# Patient Record
Sex: FEMALE | Race: WHITE | Employment: UNEMPLOYED | URBAN - METROPOLITAN AREA
[De-identification: names, ages, dates, MRNs, and addresses within clinical notes are randomized per-mention and may not be internally consistent; named-entity substitution may affect disease eponyms.]

---

## 2018-06-06 ENCOUNTER — TELEPHONE (OUTPATIENT)
Dept: INTERNAL MEDICINE CLINIC | Age: 23
End: 2018-06-06

## 2018-06-19 ENCOUNTER — OFFICE VISIT (OUTPATIENT)
Dept: INTERNAL MEDICINE CLINIC | Age: 23
End: 2018-06-19

## 2018-06-19 VITALS
SYSTOLIC BLOOD PRESSURE: 118 MMHG | TEMPERATURE: 99 F | HEIGHT: 64 IN | HEART RATE: 86 BPM | DIASTOLIC BLOOD PRESSURE: 82 MMHG | OXYGEN SATURATION: 97 % | BODY MASS INDEX: 16.46 KG/M2 | WEIGHT: 96.4 LBS

## 2018-06-19 DIAGNOSIS — R19.7 DIARRHEA, UNSPECIFIED TYPE: ICD-10-CM

## 2018-06-19 DIAGNOSIS — R63.4 ABNORMAL WEIGHT LOSS: Primary | ICD-10-CM

## 2018-06-19 DIAGNOSIS — R63.4 ABNORMAL WEIGHT LOSS: ICD-10-CM

## 2018-06-19 PROCEDURE — 99203 OFFICE O/P NEW LOW 30 MIN: CPT | Performed by: INTERNAL MEDICINE

## 2018-06-19 RX ORDER — LEVONORGESTREL AND ETHINYL ESTRADIOL 0.1-0.02MG
1 KIT ORAL DAILY
COMMUNITY
End: 2019-02-20 | Stop reason: ALTCHOICE

## 2018-06-19 ASSESSMENT — ENCOUNTER SYMPTOMS
COUGH: 0
WHEEZING: 0
SHORTNESS OF BREATH: 0

## 2018-06-19 ASSESSMENT — PATIENT HEALTH QUESTIONNAIRE - PHQ9
1. LITTLE INTEREST OR PLEASURE IN DOING THINGS: 0
SUM OF ALL RESPONSES TO PHQ QUESTIONS 1-9: 0
SUM OF ALL RESPONSES TO PHQ9 QUESTIONS 1 & 2: 0
2. FEELING DOWN, DEPRESSED OR HOPELESS: 0

## 2018-06-20 ENCOUNTER — TELEPHONE (OUTPATIENT)
Dept: INTERNAL MEDICINE CLINIC | Age: 23
End: 2018-06-20

## 2018-06-20 DIAGNOSIS — R19.7 DIARRHEA, UNSPECIFIED TYPE: ICD-10-CM

## 2018-06-20 DIAGNOSIS — R63.4 WEIGHT LOSS, ABNORMAL: Primary | ICD-10-CM

## 2018-06-20 DIAGNOSIS — R10.9 ABDOMINAL PAIN, UNSPECIFIED ABDOMINAL LOCATION: ICD-10-CM

## 2018-06-20 LAB
A/G RATIO: 2.1 (ref 1.1–2.2)
ALBUMIN SERPL-MCNC: 4.8 G/DL (ref 3.4–5)
ALP BLD-CCNC: 43 U/L (ref 40–129)
ALT SERPL-CCNC: 17 U/L (ref 10–40)
ANION GAP SERPL CALCULATED.3IONS-SCNC: 20 MMOL/L (ref 3–16)
AST SERPL-CCNC: 17 U/L (ref 15–37)
BACTERIA: ABNORMAL /HPF
BASOPHILS ABSOLUTE: 0 K/UL (ref 0–0.2)
BASOPHILS RELATIVE PERCENT: 0.5 %
BILIRUB SERPL-MCNC: 0.7 MG/DL (ref 0–1)
BILIRUBIN URINE: NEGATIVE
BLOOD, URINE: ABNORMAL
BUN BLDV-MCNC: 9 MG/DL (ref 7–20)
CALCIUM SERPL-MCNC: 9.7 MG/DL (ref 8.3–10.6)
CHLORIDE BLD-SCNC: 100 MMOL/L (ref 99–110)
CLARITY: CLEAR
CO2: 21 MMOL/L (ref 21–32)
COLOR: YELLOW
CORTISOL - PM: 46.7 UG/DL (ref 3.1–16.7)
CREAT SERPL-MCNC: 0.7 MG/DL (ref 0.6–1.1)
EOSINOPHILS ABSOLUTE: 0 K/UL (ref 0–0.6)
EOSINOPHILS RELATIVE PERCENT: 0.2 %
EPITHELIAL CELLS, UA: 5 /HPF (ref 0–5)
GFR AFRICAN AMERICAN: >60
GFR NON-AFRICAN AMERICAN: >60
GLOBULIN: 2.3 G/DL
GLUCOSE BLD-MCNC: 92 MG/DL (ref 70–99)
GLUCOSE URINE: NEGATIVE MG/DL
HCG QUALITATIVE: NEGATIVE
HCT VFR BLD CALC: 43.2 % (ref 36–48)
HEMOGLOBIN: 15.5 G/DL (ref 12–16)
HYALINE CASTS: 2 /LPF (ref 0–8)
KETONES, URINE: NEGATIVE MG/DL
LEUKOCYTE ESTERASE, URINE: NEGATIVE
LYMPHOCYTES ABSOLUTE: 1.9 K/UL (ref 1–5.1)
LYMPHOCYTES RELATIVE PERCENT: 39.4 %
MCH RBC QN AUTO: 34.2 PG (ref 26–34)
MCHC RBC AUTO-ENTMCNC: 35.9 G/DL (ref 31–36)
MCV RBC AUTO: 95.1 FL (ref 80–100)
MICROSCOPIC EXAMINATION: YES
MONOCYTES ABSOLUTE: 0.3 K/UL (ref 0–1.3)
MONOCYTES RELATIVE PERCENT: 6.3 %
NEUTROPHILS ABSOLUTE: 2.5 K/UL (ref 1.7–7.7)
NEUTROPHILS RELATIVE PERCENT: 53.6 %
NITRITE, URINE: NEGATIVE
PDW BLD-RTO: 12.6 % (ref 12.4–15.4)
PH UA: 7
PLATELET # BLD: 158 K/UL (ref 135–450)
PMV BLD AUTO: 8.9 FL (ref 5–10.5)
POTASSIUM SERPL-SCNC: 3.6 MMOL/L (ref 3.5–5.1)
PROTEIN UA: 30 MG/DL
RBC # BLD: 4.54 M/UL (ref 4–5.2)
RBC UA: 4 /HPF (ref 0–4)
SODIUM BLD-SCNC: 141 MMOL/L (ref 136–145)
SPECIFIC GRAVITY UA: 1.01
TOTAL PROTEIN: 7.1 G/DL (ref 6.4–8.2)
TSH SERPL DL<=0.05 MIU/L-ACNC: 0.9 UIU/ML (ref 0.27–4.2)
URINE TYPE: ABNORMAL
UROBILINOGEN, URINE: 0.2 E.U./DL
WBC # BLD: 4.7 K/UL (ref 4–11)
WBC UA: 2 /HPF (ref 0–5)

## 2018-06-22 ENCOUNTER — TELEPHONE (OUTPATIENT)
Dept: INTERNAL MEDICINE CLINIC | Age: 23
End: 2018-06-22

## 2018-06-26 ENCOUNTER — HOSPITAL ENCOUNTER (OUTPATIENT)
Dept: ULTRASOUND IMAGING | Age: 23
Discharge: HOME | End: 2018-06-26
Attending: INTERNAL MEDICINE | Admitting: INTERNAL MEDICINE

## 2018-06-26 ENCOUNTER — TELEPHONE (OUTPATIENT)
Dept: INTERNAL MEDICINE CLINIC | Age: 23
End: 2018-06-26

## 2018-06-26 DIAGNOSIS — R19.7 DIARRHEA, UNSPECIFIED TYPE: ICD-10-CM

## 2018-06-26 DIAGNOSIS — R10.9 ABDOMINAL PAIN, UNSPECIFIED ABDOMINAL LOCATION: ICD-10-CM

## 2018-06-26 DIAGNOSIS — R63.4 WEIGHT LOSS, ABNORMAL: ICD-10-CM

## 2018-06-26 DIAGNOSIS — R63.4 ABNORMAL WEIGHT LOSS: ICD-10-CM

## 2018-06-26 DIAGNOSIS — R19.7 DIARRHEA, UNSPECIFIED TYPE: Primary | ICD-10-CM

## 2018-07-05 ENCOUNTER — TELEPHONE (OUTPATIENT)
Dept: INTERNAL MEDICINE CLINIC | Age: 23
End: 2018-07-05

## 2018-07-05 NOTE — TELEPHONE ENCOUNTER
Left detailed message on VM that Dr Angely Baugh has openings tomorrow if she would like to schedule with him to call back. Advised if pain is \"SEVERE\" she should go to ER if needed.

## 2018-08-01 ENCOUNTER — OFFICE VISIT (OUTPATIENT)
Dept: INTERNAL MEDICINE CLINIC | Age: 23
End: 2018-08-01

## 2018-08-01 VITALS
DIASTOLIC BLOOD PRESSURE: 80 MMHG | TEMPERATURE: 98.2 F | HEIGHT: 64 IN | HEART RATE: 93 BPM | WEIGHT: 95.2 LBS | BODY MASS INDEX: 16.25 KG/M2 | OXYGEN SATURATION: 98 % | SYSTOLIC BLOOD PRESSURE: 122 MMHG

## 2018-08-01 DIAGNOSIS — N76.0 ACUTE VAGINITIS: Primary | ICD-10-CM

## 2018-08-01 LAB
APPEARANCE FLUID: CLEAR
BILIRUBIN, POC: NORMAL
BLOOD URINE, POC: NORMAL
CLARITY, POC: CLEAR
COLOR, POC: YELLOW
GLUCOSE URINE, POC: NORMAL
KETONES, POC: NORMAL
LEUKOCYTE EST, POC: NORMAL
NITRITE, POC: NORMAL
PH, POC: 6.5
PROTEIN, POC: NORMAL
SPECIFIC GRAVITY, POC: 1.01
UROBILINOGEN, POC: 0.2

## 2018-08-01 PROCEDURE — 99214 OFFICE O/P EST MOD 30 MIN: CPT | Performed by: NURSE PRACTITIONER

## 2018-08-01 PROCEDURE — 81002 URINALYSIS NONAUTO W/O SCOPE: CPT | Performed by: NURSE PRACTITIONER

## 2018-08-01 RX ORDER — FLUCONAZOLE 150 MG/1
150 TABLET ORAL DAILY
Qty: 2 TABLET | Refills: 0 | Status: SHIPPED | OUTPATIENT
Start: 2018-08-01 | End: 2018-08-03

## 2018-08-01 ASSESSMENT — ENCOUNTER SYMPTOMS: ABDOMINAL PAIN: 0

## 2018-08-01 NOTE — PROGRESS NOTES
Nina Sewell   YOB: 1995    Date of Visit:  8/1/2018      Chief Complaint   Patient presents with    Vaginitis         HPI    Sxs started about a month ago. Was initially painful with burning. She went to [facility] - STI vaginal panel. No STI was noted. Positive for yeast. Given fluconazole x 2 doses. Felt great until the past week. Sxs have since recurred. She tried the Monistat itch relief (not the antifungal). Not currently sexually active. She has EGD planned for tomorrow. Patient's last menstrual period was 07/21/2018. No Known Allergies     Outpatient Prescriptions Marked as Taking for the 8/1/18 encounter (Office Visit) with CORA Campos CNP   Medication Sig Dispense Refill    levonorgestrel-ethinyl estradiol (Ashwini Davis) 0.1-20 MG-MCG per tablet Take 1 tablet by mouth daily         Health Maintenance Due   Topic    HIV screen     Cervical cancer screen          Diagnostics (if applicable)      Review of Systems   Gastrointestinal: Negative for abdominal pain. Genitourinary: Positive for vaginal discharge. Negative for difficulty urinating, dysuria, hematuria, pelvic pain and urgency. Physical Exam   Constitutional: She is oriented to person, place, and time. She appears well-developed and well-nourished. She does not appear ill. No distress. /80 (Site: Right Arm, Position: Sitting, Cuff Size: Medium Adult)   Pulse 93   Temp 98.2 °F (36.8 °C)   Ht 5' 3.5\" (1.613 m)   Wt 95 lb 3.2 oz (43.2 kg)   SpO2 98%   BMI 16.60 kg/m²     Wt Readings from Last 3 Encounters:  08/01/18 : 95 lb 3.2 oz (43.2 kg)  06/19/18 : 96 lb 6.4 oz (43.7 kg)     HENT:   Mouth/Throat: Oropharynx is clear and moist.   Cardiovascular: Normal rate, regular rhythm and normal heart sounds. Pulmonary/Chest: Effort normal and breath sounds normal.   Abdominal: Soft. Bowel sounds are normal. She exhibits no distension and no mass. There is no tenderness.  There is no CVA tenderness. Genitourinary: Uterus normal. There is no rash, tenderness, lesion or injury on the right labia. There is no rash, tenderness, lesion or injury on the left labia. Cervix exhibits no motion tenderness, no discharge and no friability. Right adnexum displays no mass, no tenderness and no fullness. Left adnexum displays no mass, no tenderness and no fullness. Vaginal discharge (thick, white d/c is copious. Appears consistent with monilial vaginitis.) found. Lymphadenopathy:        Right: No inguinal adenopathy present. Left: No inguinal adenopathy present. Neurological: She is alert and oriented to person, place, and time. Skin: Skin is warm and dry. Psychiatric: She has a normal mood and affect. Her behavior is normal.   Nursing note and vitals reviewed. Assessment/Plan     1. Acute vaginitis  Recurrent  Send out cultures; AFFIRM panel and treat empirically with fluconazole and OTC Monistat 3 day treatment. - fluconazole (DIFLUCAN) 150 MG tablet; Take 1 tablet by mouth daily for 2 days  Dispense: 2 tablet; Refill: 0  - VAGINAL PATHOGENS PROBE *A  - GENITAL CULTURE  - POCT Urinalysis no Micro      Discussed medications with patient, who voiced understanding of their use and indications. All questions answered. Pt is advised to call if symptoms worsen or do not improve.

## 2018-08-02 ENCOUNTER — CLINICAL DOCUMENTATION (OUTPATIENT)
Dept: INTERNAL MEDICINE CLINIC | Age: 23
End: 2018-08-02

## 2018-08-02 LAB
CANDIDA SPECIES, DNA PROBE: NORMAL
GARDNERELLA VAGINALIS, DNA PROBE: NORMAL
TRICHOMONAS VAGINALIS DNA: NORMAL

## 2018-08-03 LAB — GENITAL CULTURE, ROUTINE: NORMAL

## 2018-09-13 ENCOUNTER — CLINICAL DOCUMENTATION (OUTPATIENT)
Dept: FAMILY MEDICINE CLINIC | Age: 23
End: 2018-09-13

## 2019-02-08 ENCOUNTER — OFFICE VISIT (OUTPATIENT)
Dept: INTERNAL MEDICINE CLINIC | Age: 24
End: 2019-02-08
Payer: COMMERCIAL

## 2019-02-08 VITALS
DIASTOLIC BLOOD PRESSURE: 82 MMHG | HEIGHT: 64 IN | OXYGEN SATURATION: 98 % | BODY MASS INDEX: 17.72 KG/M2 | SYSTOLIC BLOOD PRESSURE: 122 MMHG | TEMPERATURE: 97.9 F | WEIGHT: 103.8 LBS | HEART RATE: 82 BPM

## 2019-02-08 DIAGNOSIS — M79.89 TOE SWELLING: ICD-10-CM

## 2019-02-08 DIAGNOSIS — M79.674 TOE PAIN, BILATERAL: ICD-10-CM

## 2019-02-08 DIAGNOSIS — M79.89 TOE SWELLING: Primary | ICD-10-CM

## 2019-02-08 DIAGNOSIS — M79.675 TOE PAIN, BILATERAL: ICD-10-CM

## 2019-02-08 LAB
C-REACTIVE PROTEIN: 1.3 MG/L (ref 0–5.1)
RHEUMATOID FACTOR: 38 IU/ML
SEDIMENTATION RATE, ERYTHROCYTE: 6 MM/HR (ref 0–20)
URIC ACID, SERUM: 2.5 MG/DL (ref 2.6–6)

## 2019-02-08 PROCEDURE — 99213 OFFICE O/P EST LOW 20 MIN: CPT | Performed by: NURSE PRACTITIONER

## 2019-02-11 LAB — ANTI-NUCLEAR ANTIBODY (ANA): NEGATIVE

## 2019-02-12 DIAGNOSIS — M79.674 TOE PAIN, BILATERAL: ICD-10-CM

## 2019-02-12 DIAGNOSIS — M79.89 TOE SWELLING: Primary | ICD-10-CM

## 2019-02-12 DIAGNOSIS — M05.80 POLYARTHRITIS WITH POSITIVE RHEUMATOID FACTOR (HCC): ICD-10-CM

## 2019-02-12 DIAGNOSIS — M79.675 TOE PAIN, BILATERAL: ICD-10-CM

## 2019-02-12 LAB — CCP IGG ANTIBODIES: 5 UNITS (ref 0–19)

## 2019-02-14 ENCOUNTER — TELEPHONE (OUTPATIENT)
Dept: INTERNAL MEDICINE CLINIC | Age: 24
End: 2019-02-14

## 2019-02-20 ENCOUNTER — OFFICE VISIT (OUTPATIENT)
Dept: RHEUMATOLOGY | Age: 24
End: 2019-02-20
Payer: COMMERCIAL

## 2019-02-20 VITALS
DIASTOLIC BLOOD PRESSURE: 68 MMHG | HEART RATE: 68 BPM | WEIGHT: 106 LBS | BODY MASS INDEX: 18.1 KG/M2 | SYSTOLIC BLOOD PRESSURE: 112 MMHG | HEIGHT: 64 IN

## 2019-02-20 DIAGNOSIS — R76.8 RHEUMATOID FACTOR POSITIVE: ICD-10-CM

## 2019-02-20 DIAGNOSIS — M79.675 TOE PAIN, BILATERAL: ICD-10-CM

## 2019-02-20 DIAGNOSIS — M25.50 CHRONIC PAIN OF MULTIPLE JOINTS: ICD-10-CM

## 2019-02-20 DIAGNOSIS — G89.29 CHRONIC PAIN OF MULTIPLE JOINTS: ICD-10-CM

## 2019-02-20 DIAGNOSIS — K58.2 IRRITABLE BOWEL SYNDROME WITH BOTH CONSTIPATION AND DIARRHEA: ICD-10-CM

## 2019-02-20 DIAGNOSIS — M79.674 TOE PAIN, BILATERAL: ICD-10-CM

## 2019-02-20 DIAGNOSIS — M79.89 TOE SWELLING: Primary | ICD-10-CM

## 2019-02-20 PROCEDURE — 99244 OFF/OP CNSLTJ NEW/EST MOD 40: CPT | Performed by: INTERNAL MEDICINE

## 2019-02-20 RX ORDER — NAPROXEN 500 MG/1
500 TABLET ORAL 2 TIMES DAILY WITH MEALS
Qty: 60 TABLET | Refills: 0 | Status: SHIPPED | OUTPATIENT
Start: 2019-02-20 | End: 2019-03-25 | Stop reason: SDUPTHER

## 2019-02-20 RX ORDER — LEVONORGESTREL AND ETHINYL ESTRADIOL 150-30(84)
KIT ORAL
COMMUNITY
Start: 2019-01-04 | End: 2020-07-15

## 2019-02-21 ENCOUNTER — HOSPITAL ENCOUNTER (OUTPATIENT)
Age: 24
Discharge: HOME | End: 2019-02-21
Payer: COMMERCIAL

## 2019-02-21 ENCOUNTER — HOSPITAL ENCOUNTER (OUTPATIENT)
Dept: GENERAL RADIOLOGY | Age: 24
Discharge: HOME | End: 2019-02-21
Payer: COMMERCIAL

## 2019-02-21 DIAGNOSIS — G89.29 CHRONIC PAIN OF MULTIPLE JOINTS: ICD-10-CM

## 2019-02-21 DIAGNOSIS — M79.674 TOE PAIN, BILATERAL: ICD-10-CM

## 2019-02-21 DIAGNOSIS — M79.89 TOE SWELLING: ICD-10-CM

## 2019-02-21 DIAGNOSIS — M79.675 TOE PAIN, BILATERAL: ICD-10-CM

## 2019-02-21 DIAGNOSIS — K58.2 IRRITABLE BOWEL SYNDROME WITH BOTH CONSTIPATION AND DIARRHEA: ICD-10-CM

## 2019-02-21 DIAGNOSIS — M25.50 CHRONIC PAIN OF MULTIPLE JOINTS: ICD-10-CM

## 2019-02-21 DIAGNOSIS — R76.8 RHEUMATOID FACTOR POSITIVE: ICD-10-CM

## 2019-02-21 LAB
A/G RATIO: 1.5 (ref 1.1–2.2)
ALBUMIN SERPL-MCNC: 4.4 G/DL (ref 3.4–5)
ALP BLD-CCNC: 35 U/L (ref 40–129)
ALT SERPL-CCNC: 14 U/L (ref 10–40)
ANION GAP SERPL CALCULATED.3IONS-SCNC: 14 MMOL/L (ref 3–16)
AST SERPL-CCNC: 18 U/L (ref 15–37)
BASOPHILS ABSOLUTE: 0 K/UL (ref 0–0.2)
BASOPHILS RELATIVE PERCENT: 0.6 %
BILIRUB SERPL-MCNC: 0.5 MG/DL (ref 0–1)
BUN BLDV-MCNC: 10 MG/DL (ref 7–20)
C3 COMPLEMENT: 118.4 MG/DL (ref 90–180)
C4 COMPLEMENT: 12.8 MG/DL (ref 10–40)
CALCIUM SERPL-MCNC: 9.4 MG/DL (ref 8.3–10.6)
CHLORIDE BLD-SCNC: 101 MMOL/L (ref 99–110)
CO2: 23 MMOL/L (ref 21–32)
CREAT SERPL-MCNC: 0.6 MG/DL (ref 0.6–1.1)
EOSINOPHILS ABSOLUTE: 0.1 K/UL (ref 0–0.6)
EOSINOPHILS RELATIVE PERCENT: 1.1 %
GFR AFRICAN AMERICAN: >60
GFR NON-AFRICAN AMERICAN: >60
GLOBULIN: 3 G/DL
GLUCOSE BLD-MCNC: 88 MG/DL (ref 70–99)
HCT VFR BLD CALC: 44.1 % (ref 36–48)
HEMOGLOBIN: 15.2 G/DL (ref 12–16)
HEPATITIS B SURFACE ANTIGEN INTERPRETATION: NORMAL
HEPATITIS C ANTIBODY INTERPRETATION: NORMAL
LYMPHOCYTES ABSOLUTE: 1.9 K/UL (ref 1–5.1)
LYMPHOCYTES RELATIVE PERCENT: 41.5 %
MCH RBC QN AUTO: 33.3 PG (ref 26–34)
MCHC RBC AUTO-ENTMCNC: 34.5 G/DL (ref 31–36)
MCV RBC AUTO: 96.7 FL (ref 80–100)
MONOCYTES ABSOLUTE: 0.3 K/UL (ref 0–1.3)
MONOCYTES RELATIVE PERCENT: 6.6 %
NEUTROPHILS ABSOLUTE: 2.3 K/UL (ref 1.7–7.7)
NEUTROPHILS RELATIVE PERCENT: 50.2 %
PDW BLD-RTO: 12.4 % (ref 12.4–15.4)
PLATELET # BLD: 180 K/UL (ref 135–450)
PMV BLD AUTO: 8.1 FL (ref 5–10.5)
POTASSIUM SERPL-SCNC: 3.7 MMOL/L (ref 3.5–5.1)
PROTEIN PROTEIN: 0.01 G/DL
PROTEIN PROTEIN: 7 MG/DL
RBC # BLD: 4.56 M/UL (ref 4–5.2)
SODIUM BLD-SCNC: 138 MMOL/L (ref 136–145)
TOTAL PROTEIN: 7.4 G/DL (ref 6.4–8.2)
WBC # BLD: 4.6 K/UL (ref 4–11)

## 2019-02-21 PROCEDURE — 85730 THROMBOPLASTIN TIME PARTIAL: CPT

## 2019-02-21 PROCEDURE — 82595 ASSAY OF CRYOGLOBULIN: CPT

## 2019-02-21 PROCEDURE — 86147 CARDIOLIPIN ANTIBODY EA IG: CPT

## 2019-02-21 PROCEDURE — 86701 HIV-1ANTIBODY: CPT

## 2019-02-21 PROCEDURE — 73630 X-RAY EXAM OF FOOT: CPT

## 2019-02-21 PROCEDURE — 87340 HEPATITIS B SURFACE AG IA: CPT

## 2019-02-21 PROCEDURE — 80053 COMPREHEN METABOLIC PANEL: CPT

## 2019-02-21 PROCEDURE — 86803 HEPATITIS C AB TEST: CPT

## 2019-02-21 PROCEDURE — 85025 COMPLETE CBC W/AUTO DIFF WBC: CPT

## 2019-02-21 PROCEDURE — 84166 PROTEIN E-PHORESIS/URINE/CSF: CPT

## 2019-02-21 PROCEDURE — 73130 X-RAY EXAM OF HAND: CPT

## 2019-02-21 PROCEDURE — 84156 ASSAY OF PROTEIN URINE: CPT

## 2019-02-21 PROCEDURE — 86146 BETA-2 GLYCOPROTEIN ANTIBODY: CPT

## 2019-02-21 PROCEDURE — 86160 COMPLEMENT ANTIGEN: CPT

## 2019-02-21 PROCEDURE — 84165 PROTEIN E-PHORESIS SERUM: CPT

## 2019-02-21 PROCEDURE — 85610 PROTHROMBIN TIME: CPT

## 2019-02-21 PROCEDURE — 86812 HLA TYPING A B OR C: CPT

## 2019-02-21 PROCEDURE — 72200 X-RAY EXAM SI JOINTS: CPT

## 2019-02-21 PROCEDURE — 86702 HIV-2 ANTIBODY: CPT

## 2019-02-21 PROCEDURE — 84155 ASSAY OF PROTEIN SERUM: CPT

## 2019-02-21 PROCEDURE — 36415 COLL VENOUS BLD VENIPUNCTURE: CPT

## 2019-02-21 PROCEDURE — 85613 RUSSELL VIPER VENOM DILUTED: CPT

## 2019-02-21 PROCEDURE — 87390 HIV-1 AG IA: CPT

## 2019-02-21 PROCEDURE — 86704 HEP B CORE ANTIBODY TOTAL: CPT

## 2019-02-22 LAB
ALBUMIN SERPL-MCNC: 4 G/DL (ref 3.1–4.9)
ALPHA-1-GLOBULIN: 0.4 G/DL (ref 0.2–0.4)
ALPHA-2-GLOBULIN: 0.8 G/DL (ref 0.4–1.1)
BETA GLOBULIN: 1.2 G/DL (ref 0.9–1.6)
GAMMA GLOBULIN: 1.1 G/DL (ref 0.6–1.8)
HIV AG/AB: NORMAL
HIV ANTIGEN: NORMAL
HIV-1 ANTIBODY: NORMAL
HIV-2 AB: NORMAL
SPE/IFE INTERPRETATION: NORMAL
URINE ELECTROPHORESIS INTERP: NORMAL

## 2019-02-23 LAB
ANTICARDIOLIPIN IGG ANTIBODY: 2 GPL (ref 0–14)
BETA-2 GLYCOPROTEIN 1 IGG ANTIBODY: 7 SGU (ref 0–20)
BETA-2 GLYCOPROTEIN 1 IGM ANTIBODY: 3 SMU (ref 0–20)
CARDIOLIPIN AB IGM: 7 MPL (ref 0–12)
DRVVT CONFIRMATION TEST: ABNORMAL RATIO
DRVVT SCREEN: 41 SEC (ref 33–44)
DRVVT,DIL: ABNORMAL SEC (ref 33–44)
HEPATITIS B CORE TOTAL ANTIBODY: NEGATIVE
HEXAGONAL PHOSPHOLIPID NEUTRALIZAT TEST: ABNORMAL
HLA B27: NEGATIVE
LUPUS ANTICOAG INTERP: ABNORMAL
PLT NEUTA: ABNORMAL
PT D: 11.9 SEC (ref 12–15.5)
PTT D: 38 SEC (ref 32–48)
PTT-D CORR REFLEX: ABNORMAL SEC (ref 32–48)
PTT-HEPARIN NEUTRALIZED: ABNORMAL SEC (ref 32–48)
REPTILASE TIME: ABNORMAL SEC
THROMBIN TIME: ABNORMAL SEC (ref 14.7–19.5)

## 2019-02-25 DIAGNOSIS — M79.675 TOE PAIN, BILATERAL: ICD-10-CM

## 2019-02-25 DIAGNOSIS — M79.674 TOE PAIN, BILATERAL: ICD-10-CM

## 2019-02-25 DIAGNOSIS — R76.8 RHEUMATOID FACTOR POSITIVE: ICD-10-CM

## 2019-02-25 DIAGNOSIS — M79.89 TOE SWELLING: Primary | ICD-10-CM

## 2019-02-26 LAB — CRYOGLOBULIN, QUALITATIVE: NORMAL

## 2019-03-04 ENCOUNTER — HOSPITAL ENCOUNTER (OUTPATIENT)
Dept: MRI IMAGING | Age: 24
Discharge: HOME | End: 2019-03-04
Payer: COMMERCIAL

## 2019-03-04 DIAGNOSIS — M79.89 TOE SWELLING: ICD-10-CM

## 2019-03-04 DIAGNOSIS — M79.675 TOE PAIN, BILATERAL: ICD-10-CM

## 2019-03-04 DIAGNOSIS — R76.8 RHEUMATOID FACTOR POSITIVE: ICD-10-CM

## 2019-03-04 DIAGNOSIS — M79.674 TOE PAIN, BILATERAL: ICD-10-CM

## 2019-03-04 PROCEDURE — 73718 MRI LOWER EXTREMITY W/O DYE: CPT

## 2019-03-06 ENCOUNTER — OFFICE VISIT (OUTPATIENT)
Dept: RHEUMATOLOGY | Age: 24
End: 2019-03-06
Payer: COMMERCIAL

## 2019-03-06 VITALS
DIASTOLIC BLOOD PRESSURE: 84 MMHG | WEIGHT: 106 LBS | HEIGHT: 64 IN | HEART RATE: 78 BPM | BODY MASS INDEX: 18.1 KG/M2 | SYSTOLIC BLOOD PRESSURE: 104 MMHG

## 2019-03-06 DIAGNOSIS — M79.675 TOE PAIN, BILATERAL: ICD-10-CM

## 2019-03-06 DIAGNOSIS — M79.674 TOE PAIN, BILATERAL: ICD-10-CM

## 2019-03-06 DIAGNOSIS — R76.8 RHEUMATOID FACTOR POSITIVE: Primary | ICD-10-CM

## 2019-03-06 DIAGNOSIS — G89.29 CHRONIC PAIN OF MULTIPLE JOINTS: ICD-10-CM

## 2019-03-06 DIAGNOSIS — H04.123 DRY EYES: ICD-10-CM

## 2019-03-06 DIAGNOSIS — R09.89 PROLONGED CAPILLARY REFILL TIME: ICD-10-CM

## 2019-03-06 DIAGNOSIS — M25.50 CHRONIC PAIN OF MULTIPLE JOINTS: ICD-10-CM

## 2019-03-06 DIAGNOSIS — M79.89 TOE SWELLING: ICD-10-CM

## 2019-03-06 PROCEDURE — 99214 OFFICE O/P EST MOD 30 MIN: CPT | Performed by: INTERNAL MEDICINE

## 2019-03-06 RX ORDER — METHYLPREDNISOLONE 4 MG/1
TABLET ORAL
Qty: 1 KIT | Refills: 0 | Status: SHIPPED | OUTPATIENT
Start: 2019-03-06 | End: 2019-03-12

## 2019-03-14 ENCOUNTER — HOSPITAL ENCOUNTER (OUTPATIENT)
Dept: MRI IMAGING | Age: 24
Discharge: HOME | End: 2019-03-14
Payer: COMMERCIAL

## 2019-03-14 ENCOUNTER — HOSPITAL ENCOUNTER (OUTPATIENT)
Dept: VASCULAR LAB | Age: 24
Discharge: HOME | End: 2019-03-14
Payer: COMMERCIAL

## 2019-03-14 DIAGNOSIS — M25.50 CHRONIC PAIN OF MULTIPLE JOINTS: ICD-10-CM

## 2019-03-14 DIAGNOSIS — R09.89 PROLONGED CAPILLARY REFILL TIME: ICD-10-CM

## 2019-03-14 DIAGNOSIS — M79.674 TOE PAIN, BILATERAL: ICD-10-CM

## 2019-03-14 DIAGNOSIS — R76.8 RHEUMATOID FACTOR POSITIVE: ICD-10-CM

## 2019-03-14 DIAGNOSIS — M79.89 TOE SWELLING: ICD-10-CM

## 2019-03-14 DIAGNOSIS — M79.675 TOE PAIN, BILATERAL: ICD-10-CM

## 2019-03-14 DIAGNOSIS — G89.29 CHRONIC PAIN OF MULTIPLE JOINTS: ICD-10-CM

## 2019-03-14 PROCEDURE — 73218 MRI UPPER EXTREMITY W/O DYE: CPT

## 2019-03-14 PROCEDURE — 93925 LOWER EXTREMITY STUDY: CPT

## 2019-03-15 DIAGNOSIS — M25.50 CHRONIC PAIN OF MULTIPLE JOINTS: Primary | ICD-10-CM

## 2019-03-15 DIAGNOSIS — M79.674 TOE PAIN, BILATERAL: ICD-10-CM

## 2019-03-15 DIAGNOSIS — M79.675 TOE PAIN, BILATERAL: ICD-10-CM

## 2019-03-15 DIAGNOSIS — G89.29 CHRONIC PAIN OF MULTIPLE JOINTS: Primary | ICD-10-CM

## 2019-03-15 LAB
ANTI-SS-A IGG: <0.2 AI (ref 0–0.9)
ANTI-SS-B IGG: <0.2 AI (ref 0–0.9)

## 2019-03-18 LAB
ANCA IFA: NORMAL
MYELOPEROXIDASE AB: 0 AU/ML (ref 0–19)
SERINE PROTEASE 3 AB: 0 AU/ML (ref 0–19)

## 2019-03-19 ENCOUNTER — TELEPHONE (OUTPATIENT)
Dept: INTERNAL MEDICINE CLINIC | Age: 24
End: 2019-03-19

## 2019-03-19 DIAGNOSIS — M05.79 SEROPOSITIVE RHEUMATOID ARTHRITIS OF MULTIPLE JOINTS (HCC): Primary | ICD-10-CM

## 2019-03-20 ENCOUNTER — TELEPHONE (OUTPATIENT)
Dept: INTERNAL MEDICINE CLINIC | Age: 24
End: 2019-03-20

## 2019-03-20 DIAGNOSIS — M79.675 TOE PAIN, BILATERAL: ICD-10-CM

## 2019-03-20 DIAGNOSIS — M79.674 TOE PAIN, BILATERAL: ICD-10-CM

## 2019-03-20 DIAGNOSIS — M79.89 TOE SWELLING: Primary | ICD-10-CM

## 2019-03-20 RX ORDER — HYDROXYCHLOROQUINE SULFATE 200 MG/1
200 TABLET, FILM COATED ORAL DAILY
Qty: 60 TABLET | Refills: 2 | Status: SHIPPED | OUTPATIENT
Start: 2019-03-20 | End: 2019-04-16 | Stop reason: SDUPTHER

## 2019-03-25 DIAGNOSIS — M25.50 CHRONIC PAIN OF MULTIPLE JOINTS: ICD-10-CM

## 2019-03-25 DIAGNOSIS — R76.8 RHEUMATOID FACTOR POSITIVE: ICD-10-CM

## 2019-03-25 DIAGNOSIS — G89.29 CHRONIC PAIN OF MULTIPLE JOINTS: ICD-10-CM

## 2019-03-25 RX ORDER — NAPROXEN 500 MG/1
500 TABLET ORAL 2 TIMES DAILY WITH MEALS
Qty: 180 TABLET | Refills: 0 | Status: SHIPPED | OUTPATIENT
Start: 2019-03-25 | End: 2021-04-29

## 2019-03-26 ENCOUNTER — TELEPHONE (OUTPATIENT)
Dept: RHEUMATOLOGY | Age: 24
End: 2019-03-26

## 2019-03-29 LAB — SCLERODERMA (SCL-70) AB: 0 AU/ML (ref 0–40)

## 2019-04-01 ENCOUNTER — HOSPITAL ENCOUNTER (OUTPATIENT)
Dept: VASCULAR LAB | Age: 24
Discharge: HOME | End: 2019-04-01
Payer: COMMERCIAL

## 2019-04-01 DIAGNOSIS — M79.89 TOE SWELLING: ICD-10-CM

## 2019-04-01 DIAGNOSIS — M79.674 TOE PAIN, BILATERAL: ICD-10-CM

## 2019-04-01 DIAGNOSIS — M79.675 TOE PAIN, BILATERAL: ICD-10-CM

## 2019-04-01 PROCEDURE — 93922 UPR/L XTREMITY ART 2 LEVELS: CPT

## 2019-04-12 ENCOUNTER — OFFICE VISIT (OUTPATIENT)
Dept: VASCULAR SURGERY | Age: 24
End: 2019-04-12
Payer: COMMERCIAL

## 2019-04-12 VITALS
BODY MASS INDEX: 17.24 KG/M2 | HEIGHT: 64 IN | SYSTOLIC BLOOD PRESSURE: 116 MMHG | DIASTOLIC BLOOD PRESSURE: 56 MMHG | WEIGHT: 101 LBS

## 2019-04-12 DIAGNOSIS — I73.00 RAYNAUD'S DISEASE WITHOUT GANGRENE: Primary | ICD-10-CM

## 2019-04-12 PROCEDURE — 99203 OFFICE O/P NEW LOW 30 MIN: CPT | Performed by: SURGERY

## 2019-04-12 RX ORDER — NIFEDIPINE 30 MG/1
30 TABLET, EXTENDED RELEASE ORAL DAILY
Qty: 90 TABLET | Refills: 1 | Status: SHIPPED | OUTPATIENT
Start: 2019-04-12 | End: 2019-12-18 | Stop reason: CLARIF

## 2019-04-12 NOTE — PROGRESS NOTES
[facility])  Consultation/History & Physical    Date of Admission:  (Not on file)  Date of Consultation:  4/12/2019    PCP:  CORA Monsalve CNP Other:  Héctor Carvajal    Chief Complaint:    Chief Complaint   Patient presents with    New Patient           History of Present Illness:  Dimitri Doran is a 26 y.o. female who presents with no significant past medical history in referral from her rheumatologist, Dr. Héctor Carvajal for discoloration in both feet and abnormal digital perfusion on noninvasive studies. .  She states that since November 2018 she has pain with localized swelling in all digits of both feet. She states that they itch and has pain in them. She states it did get better when she was on a steroid taper. She denies any history of tobacco abuse. She otherwise has no history of claudication, rest pain or ulceration. PMH:   has no past medical history on file. PSH:   has a past surgical history that includes Inner ear surgery (Left, 2010) and [place] tooth extraction. Allergies:  No Known Allergies     Home Meds:    Prior to Admission medications    Medication Sig Start Date End Date Taking? Authorizing Provider   naproxen (NAPROSYN) 500 MG tablet Take 1 tablet by mouth 2 times daily (with meals) 3/25/19 6/23/19 Yes Chantal Fajardo MD   hydroxychloroquine (PLAQUENIL) 200 MG tablet Take 1 tablet by mouth daily 3/20/19  Yes MD Nhung Juarez 0.15-0.03 &0.01 MG TABS  1/4/19  Yes Historical Provider, MD        [facility] Meds:    Current Outpatient Medications   Medication Sig Dispense Refill    naproxen (NAPROSYN) 500 MG tablet Take 1 tablet by mouth 2 times daily (with meals) 180 tablet 0    hydroxychloroquine (PLAQUENIL) 200 MG tablet Take 1 tablet by mouth daily 60 tablet 2    DAYSEE 0.15-0.03 &0.01 MG TABS        No current facility-administered medications for this visit.         Social History:       Social History     Socioeconomic History    Marital status: Single multifactorial.  Her noninvasive studies would suggest a component of Raynaud's. However, she did see significant improvement with an anti-inflammatory and would consider that a component of her current disease state. I will try her on a 4 week course of Adalat and see if she notices any improvement in her symptoms. If she does not, would likely continue with the anti-inflammatories and conservative management. I will see her back in 4 weeks to monitor her progress. Radha Guillen M.D., FACS.  4/12/2019  8:57 AM

## 2019-04-15 NOTE — PROGRESS NOTES
@[facility]@     Renetta Zheng MD  [facility]) Physicians  [facility] [place] and Rheumatology    Rheumatology Progress Note  SUBJECTIVE:    Background:   Arian Person is a 26 y.o. CF w/ seropositive RA (RF 38, negative CCP) and Raynaud's phenomenon vs. erythromelalgia. PMHx pertinent for IBS (per pt Dx by GI (Dr. Yari Nesbitt) in 2018). Current rheum meds:   mg daily: started in 3/19    Prior rheum meds:  Naproxen 500 mg BID: improved joint Sx by 30%    Past medical/surgical history, medications and allergies are reviewed and updated as appropriate. Interval Hx:   Pt states Prednisone completely resolved her PIP joint swelling and stiffness, she states \"everything looked normal before my Sx started\" including her toes - she noted resolution of her toe discoloration and swelling on Prednisone. Pt was started on  mg daily approximately three wks ago. She's been tolerating this medication w/o difficulty. So far she has not noticed any improvement in her joint Sx. In fact, she's started to notice return of her PIP joint swelling and stiffness since she came off Prednisone 3 wks ago. MCP and wrist joints also hurt. She reports morning stiffness lasting around 30 min and difficulty w/ making a fist w/ her hands d/t stiffness. This eases up throughout the day and is exacerbated by using her hands at work. Furthermore, JOANNA showed severely abnormal digital perfusion in the toes, pt was evaluated by Vascular Surgery, Dr. Lucie Tobin, on 4/12/19, who felt her Sx are d/t Raynaud's phenomenon. She was started on a 4 wk trial of Adalat. She has not started this medication yet.   Again, pt cont to deny worsening of her toe itching/swelling/redness brought on by cold exposure.     Rheumatologic ROS:  Constitutional: denies chronic fatigue, fever/chills, night sweats, unintentional weight loss  Integumentary: denies photosensitivity, rash, patchy alopecia, or Sx of Raynaud's phenomenon  Eyes: denies dry eyes, redness or pain, visual disturbance, or floaters  Ears: denies hearing loss, tinnitus, vertigo, or recurrent ear infections  Nose: denies nasal ulcers or recurrent sinusitis  Oral cavity: denies dry mouth or oral ulcers  Cardiovascular: denies CP, palpitations, Hx of pericardial effusion or pericarditis  Respiratory: denies SOB, cough, hemoptysis, or pleurisy  Gastrointestinal: chronic abdominal cramping, constipation alternating w/ non-bloody diarrhea, denies heart burn, dysphagia or esophageal dysmotility  Genitourinary: denies change in urine amount or urine appearance, denies frothy urine or Hx of nephrolithiasis  Hematologic/Lymphatic: denies abnormal bruising or bleeding, denies Hx of blood clots or recurrent miscarriages, denies swollen LNs  Musculoskeletal:  refer to above HPI   Neurological: denies focal weakness, paresthesias/hyperesthesias or change in sensation, denies Hx of seizure, denies change in gait, balance, or memory  Psychiatric: denies Hx of depression or anxiety  Endocrine: denies cold or heat intolerance  Allergic/Immunologic: denies nasal congestion, chronic asthma, or hives    No Known Allergies    Past Medical History:    No past medical history on file. Past Surgical History:        Procedure Laterality Date    [facility]GERY [facility]    WISDOM TOOTH EXTRACTION         Medications:    Current Outpatient Medications   Medication Sig Dispense Refill    hydroxychloroquine (PLAQUENIL) 200 MG tablet Take 1 tablet by mouth daily 90 tablet 1    naproxen (NAPROSYN) 500 MG tablet Take 1 tablet by mouth 2 times daily (with meals) 180 tablet 0    DAYSEE 0.15-0.03 &0.01 MG TABS       NIFEdipine (PROCARDIA XL) 30 MG extended release tablet Take 1 tablet by mouth daily 90 tablet 1     No current facility-administered medications for this visit.          OBJECTIVE:  Physical Exam:    /83 (Site: Left Upper Arm, Position: Sitting, Cuff Size: Medium Adult)   Pulse 84   Wt 102 lb (46.3 kg)   BMI 17.51 kg/m²     GEN: AAOx3, in NAD, thin appearing, accompanied by mother  HEAD: normocephalic, atraumatic  EYES: EOMI, PERRLA, no injection or icterus  NOSE: no nasal ulcers or nasal drainage  ORAL CAVITY: moist oral mucosa w/ good saliva pooling, no oral lesions, no evidence of thrush, no evidence of parotid gland enlargement  NECK: supple w/ FROM, of evidence of lymphadenopathy  CVS: RRR, no murmurs rubs or gallops, no JVD, 2+ distal pulses b/l  LUNGS: in no acute respiratory distress, CTAB  ABDOMEN: +BS, soft, NT/ND, no evidence of organomegaly  MSK:  Spine: no kyphosis or lordosis, axial spine w/ FROM, no paraspinal muscle or vertebral tenderness, SI joints NTTP  Upper extremities:              Hands: there's enlargement w/ synovial thickening of the b/l PIP joints cool and TTP w/ mild flexion contracture most pronounced in the b/l 5th digits, +Boutonniere deformity of all the fingers, MCP joints w/o synovitis but slightly TTP, no dactylitis or sclerodactyly, able to make strong full fists but pt reports stiffness w/ formation of fist              Wrist: no visible synovitis in the wrist joints but slightly TTP, FROM              Elbow: no synovitis or bursitis, FROM              Shoulders: no pain or swelling or warmth on palpation, FROM  Lower extremities:              Knees: no warmth or effusion present, FROM              Ankles: no synovitis, FROM              Feet: toes cool to touch w/ dusky and appearance and delayed capillary refill improved since previous, prior toe swelling largely resolved, L 2-4th IP joints boggy, cool and most TTP, +MTP squeeze test, faint dorsalis pedis pulse b/l, posterior tibialis pulse palpable and symmetrical  INTEGUMENTARY: no skin thickening, no malar rash or psoriatic lesions, no petechiae, bruises, or palpable purpura, no patchy alopecia, no nail or periungual changes, no clubbing or digital ulcers  PSYCH: normal mood    DATA:  Labs: I personally reviewed interval labs and discussed w/ the pt in detail which showed:  Lab Results   Component Value Date    WBC 4.6 02/21/2019    HGB 15.2 02/21/2019    HCT 44.1 02/21/2019    MCV 96.7 02/21/2019     02/21/2019    LYMPHOPCT 41.5 02/21/2019    RBC 4.56 02/21/2019    MCH 33.3 02/21/2019    MCHC 34.5 02/21/2019    RDW 12.4 02/21/2019     Lab Results   Component Value Date     02/21/2019    K 3.7 02/21/2019     02/21/2019    CO2 23 02/21/2019    BUN 10 02/21/2019    CREATININE 0.6 02/21/2019    GLUCOSE 88 02/21/2019    CALCIUM 9.4 02/21/2019    PROT 7.4 02/21/2019    LABALBU 4.4 02/21/2019    LABALBU 4.0 02/21/2019    BILITOT 0.5 02/21/2019    ALKPHOS 35 (L) 02/21/2019    AST 18 02/21/2019    ALT 14 02/21/2019    LABGLOM >60 02/21/2019    GFRAA >60 02/21/2019    AGRATIO 1.5 02/21/2019    GLOB 3.0 02/21/2019     Interval labs from 2/8/19:  CRP and ESR wnl  Positive RF w/ titer of 38.0, negative CCP  Negative HLA B27  Negative DORCAS, normal C3 and C4  Negative LA, B2GP-1 IgM and IgG, aCL IgM and IgG  Negative cryoglobulin  Negative SPEP and UPEP  Uric acid 2.5  Negative HIV, hepatitis B and C serologies    Interval labs from 3/14/19:  Negative SSA, SSB, scl-70  Negative ANCA by IFA, MPO, PR3    Imaging:   I personally reviewed interval imaging and discussed w/ the pt in detail which included:  CT A/P (8/22/18): IMPRESSION:       No significant abnormality    X-rays (2/21/19):  R hand: Bone mineralization is normal.  No fracture or dislocation. Joint alignment and joint spaces are maintained. No erosions are present. L hand: Bone mineralization is normal.  No fracture or dislocation. Joint alignment and joint spaces are maintained. No erosions are present. R foot: Bone mineralization is normal.  No fracture or dislocation. Joint alignment and joint spaces are maintained. No erosions are present.        L foot: Bone mineralization is normal.  No fracture or dislocation. Joint alignment and joint spaces are maintained. No erosions are present. B/l SI joints: No fracture or dislocation. Sacral arcuate lines are intact. Evaluation is somewhat limited by overlying stool and bowel gas, however there is no evidence of ankylosis. Joint spaces appear maintained and no erosions are present. B/l hips appear well maintained and normal in position. MRI b/l feet w/o contrast (3/4/19): Unremarkable study. MRI hands (3/14/19):  R hand:  No evidence of RA in this nonenhanced exam.    L hand:  Incidental mild tendinosis of the flexor pollicis longus. No evidence of RA in this nonenhanced MRI of the hand. Procedures:   EGD w/ Bx(8/2/18): Normal findings of the stomach, duodenal bulb and the duodenum.     Duodenal Bx:  Duodenal mucosa w/o significant histopathologic changes. Stomach Bx:  Mild chronic inactive gastritis, negative for Helicobacter-like organisms. BLE arterial doppler w/ JOANNA (4/1/19): Impressions   Right Impression   1. The right ankle/brachial index is 1.23   2. The right toe/brachial index is . 44   3. Pulse Volume Recordings at the ankle level are normal.   4. Photoplethysmography (PPG) of the digits reveal severly abnomal waveforms on all five digits. Left Impression   1. The left ankle/brachial index is 1.19   2. The right toe/brachial index is . 62   3. Pulse Volume Recordings at the ankle level are normal.   4. Photoplethysmography (PPG) of the digits reveals a moderately abnormal waveform on the first digit and severly abnomal waveforms on digits 2 - 5. Conclusions      Summary      Normal ankle/brachial indices bilaterally. Abnormal waveforms of the left digits. Above results were discussed w/ the pt in detail during today's visit. ASSESSMENT/PLAN:   Reviewed her recent imaging studies and Vascular Surgery note w/ the pt in detail during today's visit.   Despite her unremarkable MRI findings, her significant response of joint Sx to Prednisone and clinical Hx are c/w early RA. She is tolerating low dose HCQ w/o difficulty, made referral to Ophthalmology for baseline eye exam.  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 Pt is agreeable to starting low dose MTX 4 tablets/wk, discussed risks and s/e of MTX including teratogenicity. Pt states she is currently on OCP and has no plans to get pregnant in the near future. Recommended using two methods of contraception. Check safety labs in 4 wks. Pt declined further steroids for now. Start trial of ASA 81 mg for possible erythromelalgia. Her clinical Sx are not really c/w RP. If she does not respond to ASA in 2 wks I suggested that she gives Nifedipine a try. Chadd Limon was seen today for follow-up. Diagnoses and all orders for this visit:     Diagnosis Orders   1. Seropositive rheumatoid arthritis of multiple joints (HCC)  hydroxychloroquine (PLAQUENIL) 200 MG tablet   2. High risk medication use  Ariella Valdez    CBC Auto Differential    Comprehensive Metabolic Panel    C-Reactive Protein    Sedimentation Rate    JOBY Lu MD, Ophthalmology, [facility]   3. Erythromelalgia ([address]     4.  Abnormal ankle brachial index (JOANNA)        Orders Placed This Encounter   Procedures    Ariella Valdez     Standing Status:   Future     Standing Expiration Date:   4/16/2020    CBC Auto Differential     Standing Status:   Standing     Number of Occurrences:   6     Standing Expiration Date:   4/16/2020    Comprehensive Metabolic Panel     Standing Status:   Future     Standing Expiration Date:   5/16/2019    C-Reactive Protein     Standing Status:   Future     Standing Expiration Date:   5/16/2019    Sedimentation Rate     Standing Status:   Future     Standing Expiration Date:   5/16/2019    JOBY Lu MD,

## 2019-04-16 ENCOUNTER — OFFICE VISIT (OUTPATIENT)
Dept: RHEUMATOLOGY | Age: 24
End: 2019-04-16
Payer: COMMERCIAL

## 2019-04-16 VITALS
WEIGHT: 102 LBS | HEART RATE: 84 BPM | SYSTOLIC BLOOD PRESSURE: 108 MMHG | DIASTOLIC BLOOD PRESSURE: 83 MMHG | BODY MASS INDEX: 17.51 KG/M2

## 2019-04-16 DIAGNOSIS — Z79.899 HIGH RISK MEDICATION USE: ICD-10-CM

## 2019-04-16 DIAGNOSIS — M05.79 SEROPOSITIVE RHEUMATOID ARTHRITIS OF MULTIPLE JOINTS (HCC): Primary | ICD-10-CM

## 2019-04-16 DIAGNOSIS — I73.81 ERYTHROMELALGIA (HCC): ICD-10-CM

## 2019-04-16 DIAGNOSIS — R68.89 ABNORMAL ANKLE BRACHIAL INDEX (ABI): ICD-10-CM

## 2019-04-16 PROCEDURE — 99214 OFFICE O/P EST MOD 30 MIN: CPT | Performed by: INTERNAL MEDICINE

## 2019-04-16 RX ORDER — HYDROXYCHLOROQUINE SULFATE 200 MG/1
200 TABLET, FILM COATED ORAL DAILY
Qty: 90 TABLET | Refills: 1 | Status: SHIPPED | OUTPATIENT
Start: 2019-04-16 | End: 2019-07-02

## 2019-04-20 LAB
QUANTI TB GOLD PLUS: NEGATIVE
QUANTI TB1 MINUS NIL: 0.01 IU/ML (ref 0–0.34)
QUANTI TB2 MINUS NIL: 0 IU/ML (ref 0–0.34)
QUANTIFERON MITOGEN: >10 IU/ML
QUANTIFERON NIL: 0.03 IU/ML

## 2019-04-22 DIAGNOSIS — M05.9 SEROPOSITIVE RHEUMATOID ARTHRITIS (HCC): Primary | ICD-10-CM

## 2019-04-22 RX ORDER — FOLIC ACID 1 MG/1
1 TABLET ORAL DAILY
Qty: 90 TABLET | Refills: 1 | Status: SHIPPED | OUTPATIENT
Start: 2019-04-22 | End: 2020-09-23 | Stop reason: SDUPTHER

## 2019-04-24 ENCOUNTER — TELEPHONE (OUTPATIENT)
Dept: VASCULAR SURGERY | Age: 24
End: 2019-04-24

## 2019-04-24 NOTE — TELEPHONE ENCOUNTER
Patient called to report that she took her first dose of Nifedipine last night at 10pm. She experienced chest pain pretty much right away. She dealt with it thru the night and most of the day at work. She had to leave work early to go home to lay down. She thought it was getting a little better but decided to call the office to report it. Per Dr. Jacinta Martínez, she should not take anymore medication. If the chest pain do not subside she should go to the ER. She has a follow up appt with Dr. Cecil Rodriguez in 2 weeks.

## 2019-05-03 ENCOUNTER — TELEPHONE (OUTPATIENT)
Dept: INTERNAL MEDICINE CLINIC | Age: 24
End: 2019-05-03

## 2019-05-03 DIAGNOSIS — Z79.899 HIGH RISK MEDICATION USE: Primary | ICD-10-CM

## 2019-05-03 NOTE — TELEPHONE ENCOUNTER
Please call pt back and let her know that I changed her Ophthalmology referral to URGENT status, hopefully she can get an appt before she moves. Please also let pt know that I'm trying to find a rheumatologist in the [facility] area for her, we will get in touch with her as soon as I hear back.

## 2019-05-03 NOTE — TELEPHONE ENCOUNTER
Pt calling-pt was referred to go an eye doctor. She called and is not able to get appt til Aug.    [place] of [place] in July. Eye doc office told her to call and get her referral \"upgraded\" so she can get a sooner appt. You offered to hep her find a temp rheumatologist in [place]. Pt asking if you know of anyone in the [place], [place] or at [facility]. Pt will only be living there for 1yr.

## 2019-05-10 ENCOUNTER — OFFICE VISIT (OUTPATIENT)
Dept: VASCULAR SURGERY | Age: 24
End: 2019-05-10
Payer: COMMERCIAL

## 2019-05-10 VITALS
DIASTOLIC BLOOD PRESSURE: 58 MMHG | HEIGHT: 64 IN | SYSTOLIC BLOOD PRESSURE: 116 MMHG | WEIGHT: 101 LBS | BODY MASS INDEX: 17.24 KG/M2

## 2019-05-10 DIAGNOSIS — I73.00 RAYNAUD'S DISEASE WITHOUT GANGRENE: Primary | ICD-10-CM

## 2019-05-10 PROCEDURE — 99212 OFFICE O/P EST SF 10 MIN: CPT | Performed by: SURGERY

## 2019-05-10 NOTE — PROGRESS NOTES
[facility]   Vascular Surgery Followup    Referring Provider:  CORA Steve CNP     Chief Complaint   Patient presents with    Follow-up        History of Present Illness:   60-year-old female here today for a 4 week follow-up of lower extremity swelling. She was unable to tolerate the Adalat secondary to mild chest discomfort and dizziness. She is on aspirin and methotrexate currently. No significant changes from previous visit. Past Medical History:   has no past medical history on file. Surgical History:   has a past surgical history that includes Inner ear surgery (Left, 2010) and [place] tooth extraction. Social History:   reports that she has never smoked. She has never used smokeless tobacco. She reports that she drinks about 1.8 oz of alcohol per week. She reports that she does not use drugs. Family History:  family history is not on file. Home Medications:  Current Outpatient Medications   Medication Sig Dispense Refill    methotrexate (RHEUMATREX) 2.5 MG chemo tablet Take 4 tablets by mouth once a week 30 tablet 1    folic acid (FOLVITE) 1 MG tablet Take 1 tablet by mouth daily 90 tablet 1    hydroxychloroquine (PLAQUENIL) 200 MG tablet Take 1 tablet by mouth daily 90 tablet 1    NIFEdipine (PROCARDIA XL) 30 MG extended release tablet Take 1 tablet by mouth daily 90 tablet 1    naproxen (NAPROSYN) 500 MG tablet Take 1 tablet by mouth 2 times daily (with meals) 180 tablet 0    DAYSEE 0.15-0.03 &0.01 MG TABS        No current facility-administered medications for this visit. Allergies:  Patient has no known allergies. Review of Systems:   · Constitutional: there has been no unanticipated weight loss. There's been no change in energy level, sleep pattern, or activity level. · Eyes: No visual changes or diplopia. No scleral icterus. · ENT: No Headaches, hearing loss or vertigo. No mouth sores or sore throat.   · Cardiovascular: Reviewed in HPI  · Respiratory: No cough or wheezing, no sputum production. No hematemesis. · Gastrointestinal: No abdominal pain, appetite loss, blood in stools. No change in bowel or bladder habits. · Genitourinary: No dysuria, trouble voiding, or hematuria. · Musculoskeletal:  No gait disturbance, weakness or joint complaints. · Integumentary: No rash or pruritis. · Neurological: No headache, diplopia, change in muscle strength, numbness or tingling. No change in gait, balance, coordination, mood, affect, memory, mentation, behavior. · Psychiatric: No anxiety, no depression. · Endocrine: No malaise, fatigue or temperature intolerance. No excessive thirst, fluid intake, or urination. No tremor. · Hematologic/Lymphatic: No abnormal bruising or bleeding, blood clots or swollen lymph nodes. · Allergic/Immunologic: No nasal congestion or hives. Physical Examination:    There were no vitals filed for this visit. General appearance: alert, appears stated age, cooperative and no distress  Purpuric discoloration of both feet. Palpable pulses. Assessment:     Patient Active Problem List   Diagnosis    Diarrhea    Abnormal weight loss    Acute vaginitis    Toe swelling    Toe pain, bilateral    Polyarthritis with positive rheumatoid factor ([facility] [address]       Plan:  67-year-old female with bilateral lower extremity discoloration. She was unable to tolerate nifedipine secondary to side effects. Do not feel all of her symptoms are related to underlying Raynaud's and no significant alternatives from a vascular perspective. Would continue with current anti-inflammatory regimen and adjustments per Dr. Paddy Sands. .  She can follow up with me as needed. Thank you for allowing me to participate in the care of this individual.  Please do not hesitate to contact me with any questions. Kelsey Galan M.D., FACS.   5/10/2019  9:57 AM

## 2019-05-13 ENCOUNTER — TELEPHONE (OUTPATIENT)
Dept: RHEUMATOLOGY | Age: 24
End: 2019-05-13

## 2019-05-13 NOTE — TELEPHONE ENCOUNTER
----- Message from Claudeen Hooks, MD sent at 5/10/2019  4:33 PM EDT -----  Please call pt and let her know that I found a rheumatologist at [facility] who can see the pt after she moves there. Her name is Dr. Tamy Mckay. The pt can call [phone] to make an appointment with Dr. Oseas Gomes. Let me know if she runs into any difficulties. Pampha please call Dr. Tim Turner office at the number above and let her office know that we're referring a pt to her (pt will be moving from [place] to [place], [place] in July). Please fax my last 2 progress notes and her recent labs to her.

## 2019-06-05 ENCOUNTER — TELEPHONE (OUTPATIENT)
Dept: INTERNAL MEDICINE CLINIC | Age: 24
End: 2019-06-05

## 2019-06-05 NOTE — TELEPHONE ENCOUNTER
Pt states she needs a tdap shot in order to have a form completed she needs for school, per office she needs an appt before a lab visit for the shot. No openings on your schedule until 6/14,  the form is due 6/15 but she will be out of town the entire week next week. Are you able to fit her in Friday 6/7 to get the shot?

## 2019-06-07 ENCOUNTER — NURSE ONLY (OUTPATIENT)
Dept: INTERNAL MEDICINE CLINIC | Age: 24
End: 2019-06-07
Payer: COMMERCIAL

## 2019-06-07 DIAGNOSIS — Z23 NEED FOR PROPHYLACTIC VACCINATION USING DIPHTHERIA, TETANUS, AND ACELLULAR PERTUSSIS (DTAP) VACCINE: Primary | ICD-10-CM

## 2019-06-07 PROCEDURE — 90471 IMMUNIZATION ADMIN: CPT | Performed by: NURSE PRACTITIONER

## 2019-06-07 PROCEDURE — 90715 TDAP VACCINE 7 YRS/> IM: CPT | Performed by: NURSE PRACTITIONER

## 2019-06-10 ENCOUNTER — TELEPHONE (OUTPATIENT)
Dept: INTERNAL MEDICINE CLINIC | Age: 24
End: 2019-06-10

## 2019-06-11 ENCOUNTER — TELEPHONE (OUTPATIENT)
Dept: INTERNAL MEDICINE CLINIC | Age: 24
End: 2019-06-11

## 2019-07-02 ENCOUNTER — OFFICE VISIT (OUTPATIENT)
Dept: RHEUMATOLOGY | Age: 24
End: 2019-07-02
Payer: COMMERCIAL

## 2019-07-02 VITALS
BODY MASS INDEX: 17.34 KG/M2 | HEART RATE: 102 BPM | WEIGHT: 101 LBS | DIASTOLIC BLOOD PRESSURE: 77 MMHG | SYSTOLIC BLOOD PRESSURE: 116 MMHG

## 2019-07-02 DIAGNOSIS — M05.79 SEROPOSITIVE RHEUMATOID ARTHRITIS OF MULTIPLE JOINTS (HCC): ICD-10-CM

## 2019-07-02 DIAGNOSIS — M25.552 LEFT HIP PAIN: ICD-10-CM

## 2019-07-02 DIAGNOSIS — Z79.899 HIGH RISK MEDICATION USE: Primary | ICD-10-CM

## 2019-07-02 PROCEDURE — 99214 OFFICE O/P EST MOD 30 MIN: CPT | Performed by: INTERNAL MEDICINE

## 2019-07-02 RX ORDER — TRETINOIN 0.5 MG/G
CREAM TOPICAL
COMMUNITY
Start: 2019-06-28

## 2019-07-02 RX ORDER — LEVONORGESTREL AND ETHINYL ESTRADIOL 0.1-0.02MG
1 KIT ORAL
COMMUNITY
Start: 2017-09-11 | End: 2020-09-23

## 2019-07-02 NOTE — PROGRESS NOTES
facility-administered medications for this visit.          OBJECTIVE:  Physical Exam:    /77   Pulse 102   Wt 101 lb (45.8 kg)   BMI 17.34 kg/m²     GEN: AAOx3, in NAD, thin appearing, accompanied by mother  HEAD: normocephalic, atraumatic  EYES: EOMI, PERRLA, no injection or icterus  NOSE: no nasal ulcers or nasal drainage  ORAL CAVITY: moist oral mucosa w/ good saliva pooling, no oral lesions, no evidence of thrush, no evidence of parotid gland enlargement  CVS: RRR, no murmurs rubs or gallops, no JVD, 2+ distal pulses b/l  LUNGS: in no acute respiratory distress, CTAB  ABDOMEN: +BS, soft, NT/ND, no evidence of organomegaly  MSK:  Spine: no kyphosis or lordosis, axial spine w/ FROM, no paraspinal muscle or vertebral tenderness, SI joints NTTP  Upper extremities:              Hands: no active synovitis, joints NTTP, there's chronic bony enlargement of the b/l PIP joints w/ mild flexion contracture most pronounced in the b/l 5th digits, +Boutonniere deformity improved since previous, no dactylitis or sclerodactyly, able to make strong full fists              Wrist: no visible synovitis in the wrist joints but slightly TTP, FROM              Elbow: no synovitis or bursitis, FROM              Shoulders: no pain or swelling or warmth on palpation, FROM  Lower extremities:   Hips: +log roll test on L side, JUWAN test of L hip causes L groin discomfort, pt reports pain in L hip w/ IR of the hip              Knees: no warmth or effusion present, FROM              Ankles: no synovitis, FROM              Feet: toes cool to touch w/ dusky appearance and slighlty delayed capillary refill, prior toe swelling resolved, faint dorsalis pedis pulse b/l, posterior tibialis pulse palpable and symmetrical  INTEGUMENTARY: no skin thickening, no malar rash or psoriatic lesions, no petechiae, bruises, or palpable purpura, no patchy alopecia, no nail or periungual changes, no clubbing or digital ulcers  PSYCH: normal No evidence of RA in this nonenhanced MRI of the hand. Procedures:   EGD w/ Bx(8/2/18): Normal findings of the stomach, duodenal bulb and the duodenum.     Duodenal Bx:  Duodenal mucosa w/o significant histopathologic changes. Stomach Bx:  Mild chronic inactive gastritis, negative for Helicobacter-like organisms. BLE arterial doppler w/ JOANNA (4/1/19): Impressions   R Impression   1. The right ankle/brachial index is 1.23   2. The right toe/brachial index is . 44   3. Pulse Volume Recordings at the ankle level are normal.   4. Photoplethysmography (PPG) of the digits reveal severly abnomal waveforms on all five digits. L Impression   1. The left ankle/brachial index is 1.19   2. The right toe/brachial index is . 62   3. Pulse Volume Recordings at the ankle level are normal.   4. Photoplethysmography (PPG) of the digits reveals a moderately abnormal waveform on the first digit and severly abnomal waveforms on digits 2 - 5. Conclusions      Summary      Normal ankle/brachial indices bilaterally. Abnormal waveforms of the left digits. Above results were discussed w/ the pt in detail during today's visit. ASSESSMENT/PLAN:   No synovitis appreciated on exam today. Stop HCQ d/t GI s/e. Increase MTX dose from 4 to 6 tablets/wk. Repeat safety labs in 4 wks. Check L hip X-ray, evaluate for ERICK. No prior Hx of chronic steroid use. Pt states she has appt scheduled w/ her new rheumatologist in [address] [place] [address] for August.  She will be attending grad school at [employer] for the next yr. Will fax records. Reviewed vascular surgery note by Dr. Tyler Dahl on 5/10/19. I suspect her prior toe swelling and itching are d/t erythromelalgia as her Sx were not c/w Raynaud's phenomenon, this is improved/resolved on baby ASA. Currently asymptomatic. Ashlee Grant was seen today for follow-up. Diagnoses and all orders for this visit:     Diagnosis Orders   1.  High risk medication use  CBC Auto Differential    Comprehensive Metabolic Panel   2. Seropositive rheumatoid arthritis of multiple joints (HCC)  methotrexate (RHEUMATREX) 2.5 MG chemo tablet    Handicap Placard MISC   3. Left hip pain  XR HIP LEFT (2-3 VIEWS)      Orders Placed This Encounter   Procedures    XR HIP LEFT (2-3 VIEWS)     Standing Status:   Future     Standing Expiration Date:   7/2/2020     Order Specific Question:   Reason for exam:     Answer:   rheumatoid arthritis, left hip pain with internal rotation, evaluate for ERICK    CBC Auto Differential     Standing Status:   Future     Standing Expiration Date:   1/2/2020    Comprehensive Metabolic Panel     Standing Status:   Future     Standing Expiration Date:   1/2/2020     Outpatient Encounter Medications as of 7/2/2019   Medication Sig Dispense Refill    levonorgestrel-ethinyl estradiol (AVIANE;ALESSE;LESSINA) 0.1-20 MG-MCG per tablet Take 1 tablet by mouth      tretinoin (RETIN-A) 0.05 % cream       methotrexate (RHEUMATREX) 2.5 MG chemo tablet Take 6 tablets by mouth once a week 48 tablet 0    Handicap Placard MISC by Does not apply route Permanent. 1 each 0    folic acid (FOLVITE) 1 MG tablet Take 1 tablet by mouth daily 90 tablet 1    NIFEdipine (PROCARDIA XL) 30 MG extended release tablet Take 1 tablet by mouth daily 90 tablet 1    DAYSEE 0.15-0.03 &0.01 MG TABS       [DISCONTINUED] methotrexate (RHEUMATREX) 2.5 MG chemo tablet       [DISCONTINUED] hydroxychloroquine (PLAQUENIL) 200 MG tablet Take 1 tablet by mouth daily 90 tablet 1    naproxen (NAPROSYN) 500 MG tablet Take 1 tablet by mouth 2 times daily (with meals) 180 tablet 0     No facility-administered encounter medications on file as of 7/2/2019. Return in about 1 year (around 7/2/2020) for lab result discussion and treatment plan, medication monitoring. The risks and benefits of my recommendations, as well as other treatment options, benefits and side effects were discussed with the patient today.  Questions were

## 2019-07-03 ENCOUNTER — TELEPHONE (OUTPATIENT)
Dept: RHEUMATOLOGY | Age: 24
End: 2019-07-03

## 2019-07-03 ENCOUNTER — HOSPITAL ENCOUNTER (OUTPATIENT)
Age: 24
Discharge: HOME | End: 2019-07-03
Payer: COMMERCIAL

## 2019-07-03 ENCOUNTER — HOSPITAL ENCOUNTER (OUTPATIENT)
Dept: GENERAL RADIOLOGY | Age: 24
Discharge: HOME | End: 2019-07-03
Payer: COMMERCIAL

## 2019-07-03 DIAGNOSIS — M25.552 PAIN OF LEFT HIP JOINT: ICD-10-CM

## 2019-07-03 DIAGNOSIS — M25.552 LEFT HIP PAIN: ICD-10-CM

## 2019-07-03 DIAGNOSIS — M21.70 LEG LENGTH DISCREPANCY: Primary | ICD-10-CM

## 2019-07-03 PROCEDURE — 73502 X-RAY EXAM HIP UNI 2-3 VIEWS: CPT

## 2019-07-03 NOTE — TELEPHONE ENCOUNTER
Notes recorded by Lawrence Herrera MD on 7/3/2019 at 1:22 PM EDT  Please call pt and let her know that X-ray of her L hip did not show any arthritis but there she has leg length discrepancy (one leg is longer than the other) which could be causing her leg pain and I have referred her to the orthopedic surgeon for this finding and her hip pain. Called pt to inform message below, pt unavailable, cannot leave VM due to VM is full. If pt call back, please inform pt message.

## 2019-12-16 ENCOUNTER — TELEPHONE (OUTPATIENT)
Dept: RHEUMATOLOGY | Age: 24
End: 2019-12-16

## 2019-12-18 ENCOUNTER — OFFICE VISIT (OUTPATIENT)
Dept: RHEUMATOLOGY | Age: 24
End: 2019-12-18
Payer: COMMERCIAL

## 2019-12-18 VITALS
SYSTOLIC BLOOD PRESSURE: 107 MMHG | HEART RATE: 78 BPM | DIASTOLIC BLOOD PRESSURE: 77 MMHG | BODY MASS INDEX: 18.02 KG/M2 | WEIGHT: 105 LBS

## 2019-12-18 DIAGNOSIS — Z51.81 ENCOUNTER FOR THERAPEUTIC DRUG MONITORING: ICD-10-CM

## 2019-12-18 DIAGNOSIS — M35.7 BENIGN JOINT HYPERMOBILITY: ICD-10-CM

## 2019-12-18 DIAGNOSIS — M05.9 SEROPOSITIVE RHEUMATOID ARTHRITIS (HCC): ICD-10-CM

## 2019-12-18 DIAGNOSIS — M05.9 SEROPOSITIVE RHEUMATOID ARTHRITIS (HCC): Primary | ICD-10-CM

## 2019-12-18 DIAGNOSIS — Z79.899 HIGH RISK MEDICATION USE: ICD-10-CM

## 2019-12-18 PROBLEM — M05.80 POLYARTHRITIS WITH POSITIVE RHEUMATOID FACTOR (HCC): Status: RESOLVED | Noted: 2019-02-12 | Resolved: 2019-12-18

## 2019-12-18 LAB
C-REACTIVE PROTEIN: 2.9 MG/L (ref 0–5.1)
SEDIMENTATION RATE, ERYTHROCYTE: 7 MM/HR (ref 0–20)

## 2019-12-18 PROCEDURE — 99214 OFFICE O/P EST MOD 30 MIN: CPT | Performed by: INTERNAL MEDICINE

## 2019-12-18 RX ORDER — IBUPROFEN 200 MG
1 TABLET ORAL WEEKLY
Qty: 10 EACH | Refills: 2 | Status: SHIPPED | OUTPATIENT
Start: 2019-12-18 | End: 2020-07-25 | Stop reason: SDUPTHER

## 2019-12-18 RX ORDER — PREDNISONE 10 MG/1
TABLET ORAL
Qty: 21 TABLET | Refills: 1 | Status: SHIPPED | OUTPATIENT
Start: 2019-12-18 | End: 2020-01-01

## 2019-12-18 RX ORDER — METHOTREXATE 25 MG/ML
20 INJECTION, SOLUTION INTRA-ARTERIAL; INTRAMUSCULAR; INTRAVENOUS WEEKLY
Qty: 4 VIAL | Refills: 2 | Status: SHIPPED | OUTPATIENT
Start: 2019-12-18 | End: 2020-07-25 | Stop reason: SDUPTHER

## 2020-02-28 NOTE — PROGRESS NOTES
Eder Dupont MD  [facility]) Physicians - Rheumatology    [] [facility]:  [address]  [place], [address] [x] [place] Office:  [address], [address] [facility]  [place]   Office: [phone]  Fax: [phone]     Rheumatology Progress Note    SUBJECTIVE:    Background:   Margie Roberts is a 22 y.o. CF w/ seropositive RA (RF 38, negative CCP) and erythromelalgia/Raynaud's phenomenon. PMHx pertinent for IBS (per pt Dx by GI (Dr. Lenz) in 2018). Current rheum meds:  MTX 20 mg SC wkly: started in 4/19  Naproxen 500 mg BID PRN    Prior rheum meds:  Naproxen 500 mg BID: improved joint Sx by 30%   mg daily: took from 3/19 to 7/19, caused diarrhea  ASA 81 mg daily: per pt this did improve her feet Sx, she self d/c'ed as her Sx resolved    Past medical/surgical history, medications and allergies are reviewed and updated as appropriate. Interval Hx:   Pt reports improvement of her joint Sx since switching to SC form of MTX. She denies any joint pain or swelling. Morning stiffness lasts a few min now. She denies any active Sx of Raynaud's phenomenon, feels this has improved since moving to [place]. Pt states she had f/u appt w/ her rheumatologist in [place] last month, she reportedly had normal labs and no changes were made to her IS regimen. Pt reports intermittent abd cramping nina after she eats. She reports poor appetite, weight has been stable. She reports heart burn. Has not had taken any NSAIDs recently. She denies any diarrhea, bloody stools or constipation. Of note, pt states she will be graduating in May. She is looking for a job in marketing and is not sure where she will relocate to after graduation.     Rheumatologic ROS:  Constitutional: denies chronic fatigue, fever/chills, night sweats, unintentional weight loss  Integumentary: denies photosensitivity, rash, patchy alopecia, or Sx of Raynaud's phenomenon  Eyes: denies dry eyes, redness or pain, visual disturbance, or floaters  Ears: denies hearing loss, tinnitus, vertigo, or recurrent ear infections  Nose: denies nasal ulcers or recurrent sinusitis  Oral cavity: denies dry mouth or oral ulcers  Cardiovascular: denies CP, palpitations, Hx of pericardial effusion or pericarditis  Respiratory: denies SOB, cough, hemoptysis, or pleurisy  Gastrointestinal: +refer to above HPI, dysphagia or esophageal dysmotility  Musculoskeletal:  refer to above HPI     No Known Allergies    Past Medical History:        Diagnosis Date    Polyarthritis with positive rheumatoid factor ([address] 2/12/2019       Past Surgical History:        Procedure Laterality Date    IN[facility] EAR SURGERY [facility] 2010    [facility]    WISDOM TOOTH EXTRACTION         Medications:    Current Outpatient Medications   Medication Sig Dispense Refill    omeprazole (PRILOSEC) 40 MG delayed release capsule Take 1 capsule by mouth every morning (before breakfast) 30 capsule 2    sertraline (ZOLOFT) 50 MG tablet sertraline 50 mg tablet      methotrexate Sodium (RHEUMATREX) 50 MG/2ML chemo injection Inject 0.8 mLs into the skin once a week 4 vial 2    levonorgestrel-ethinyl estradiol (AVIANE;ALESSE;LESSINA) 0.1-20 MG-MCG per tablet Take 1 tablet by mouth      tretinoin (RETIN-A) 0.05 % cream       Handicap Placard MISC by Does not apply route Permanent. 1 each 0    folic acid (FOLVITE) 1 MG tablet Take 1 tablet by mouth daily 90 tablet 1    naproxen (NAPROSYN) 500 MG tablet Take 1 tablet by mouth 2 times daily (with meals) 180 tablet 0    DAYSEE 0.15-0.03 &0.01 MG TABS       INSULIN SYRINGE 1CC/29G (COMFORT ASSIST INSULIN SYRINGE) 29G X 1/2\" 1 ML MISC 1 each by Does not apply route once a week Use one syringe to inject weekly Methotrexate into skin. 10 each 2     No current facility-administered medications for this visit.          OBJECTIVE:  Physical Exam:    /72 (Site: Left Upper Arm, Position: Sitting, Cuff Size: Medium Adult)   Pulse 76   Wt 108 lb (49 kg)   BMI 18.54 kg/m²     GEN: AAOx3, in NAD, thin appearing, accompanied by mother  HEAD: normocephalic, atraumatic  EYES: EOMI, PERRLA, no injection or icterus  NOSE: no nasal ulcers or nasal drainage  ORAL CAVITY: moist oral mucosa w/ good saliva pooling, no oral lesions, no evidence of thrush, no evidence of parotid gland enlargement  CVS: RRR  LUNGS: in no acute respiratory distress, CTAB  MSK:  Upper extremities:              Hands: no active synovitis, joints NTTP, chronic bony enlargement of the b/l PIP joints w/ 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, able to make strong full fists              Wrist: no visible synovitis in the wrist joints but slightly TTP, FROM              Elbow: no synovitis or bursitis, FROM  Lower extremities:              Knees: no warmth or effusion present, FROM              Ankles: no synovitis, FROM              Feet: toes cool to touch w/ dusky appearance, prior toe swelling resolved, -MTP squeeze test  INTEGUMENTARY: no skin thickening, no malar rash or psoriatic lesions, no petechiae, bruises, or palpable purpura, no patchy alopecia, no nail or periungual changes, no clubbing or digital ulcers    DATA:  Labs: I personally reviewed interval labs and discussed w/ the pt in detail which showed:  CBC w/ diff (10/10/19): wnl  CMP (10/10/19): normal renal and hepatic function    Interval labs from 2/21/19:  Positive RF w/ titer of 38.0, negative CCP  Negative HLA B27  Negative DORCAS, normal C3 and C4  Negative LA, B2GP-1 IgM and IgG, aCL IgM and IgG  Negative cryoglobulin  Negative SPEP and UPEP  Uric acid 2.5  Negative HIV, hepatitis B and C serologies    Interval labs from 3/14/19:  Negative SSA, SSB, scl-70  Negative ANCA by IFA, MPO, PR3    Negative QuantiFERON (4/16/19)    CRP and ESR wnl (2/21/19, 6/3/19, 10/10/19, 12/18/19)    Imaging:   I personally reviewed interval imaging and discussed w/ the pt in detail which included:  CT A/P (8/22/18):   IMPRESSION:     No significant abnormality    X-rays (2/21/19):  R hand: Bone mineralization is normal.  No fracture or dislocation. Joint alignment and joint spaces are maintained. No erosions are present. L hand: Bone mineralization is normal.  No fracture or dislocation. Joint alignment and joint spaces are maintained. No erosions are present. R foot: Bone mineralization is normal.  No fracture or dislocation. Joint alignment and joint spaces are maintained. No erosions are present. L foot: Bone mineralization is normal.  No fracture or dislocation. Joint alignment and joint spaces are maintained. No erosions are present. B/l SI joints: No fracture or dislocation. Sacral arcuate lines are intact. Evaluation is somewhat limited by overlying stool and bowel gas, however there is no evidence of ankylosis. Joint spaces appear maintained and no erosions are present. B/l hips appear well maintained and normal in position. MRI hands (3/14/19):  R hand:  No evidence of RA in this nonenhanced exam.  L hand:  Incidental mild tendinosis of the flexor pollicis longus. No evidence of RA in this nonenhanced MRI of the hand. MRI b/l feet w/o contrast (3/4/19): Unremarkable study. Procedures:   EGD w/ Bx(8/2/18): Normal findings of the stomach, duodenal bulb and the duodenum.     Duodenal Bx:  Duodenal mucosa w/o significant histopathologic changes. Stomach Bx:  Mild chronic inactive gastritis, negative for Helicobacter-like organisms. BLE arterial doppler w/ JOANNA (4/1/19): Impressions   R Impression   1. The right ankle/brachial index is 1.23   2. The right toe/brachial index is . 44   3. Pulse Volume Recordings at the ankle level are normal.   4. Photoplethysmography (PPG) of the digits reveal severly abnomal waveforms on all five digits. L Impression   1. The left ankle/brachial index is 1.19   2. The right toe/brachial index is . 62   3.  Pulse Volume Recordings at the ankle level are normal.   4. Photoplethysmography (PPG) of the digits reveals a moderately abnormal waveform on the first digit and severly abnomal waveforms on digits 2 - 5. Conclusions      Summary      Normal ankle/brachial indices bilaterally. Abnormal waveforms of the left digits. Above results were discussed w/ the pt in detail during today's visit. ASSESSMENT/PLAN:   Satisfactory disease control of RA, cont current dose of MTX. Will obtain most recent OV note and labs from Dr. Rosario Stacks office from last month. Ordered safety labs to be drawn Q3mo. Start trial of Omeprazole for heart burn. Instructed pt to avoid any NSAIDs. She may take Acetaminophen for additional pain relief. I encouraged her to make a f/u appt w/ her GI. Latasha Leonard was seen today for follow-up. Diagnoses and all orders for this visit:     Diagnosis Orders   1. High risk medication use  ALT    AST    CBC Auto Differential    Creatinine, Serum   2. Seropositive rheumatoid arthritis (HCC)  C-Reactive Protein   3. Encounter for therapeutic drug monitoring  ALT    AST    CBC Auto Differential    Creatinine, Serum   4.  Heart burn  omeprazole (PRILOSEC) 40 MG delayed release capsule      Orders Placed This Encounter   Procedures    ALT     Standing Status:   Future     Standing Expiration Date:   3/3/2021    AST     Standing Status:   Future     Standing Expiration Date:   3/3/2021    CBC Auto Differential     Standing Status:   Future     Standing Expiration Date:   9/3/2020    Creatinine, Serum     Standing Status:   Future     Standing Expiration Date:   3/3/2021    C-Reactive Protein     Standing Status:   Future     Standing Expiration Date:   9/3/2020     Outpatient Encounter Medications as of 3/3/2020   Medication Sig Dispense Refill    omeprazole (PRILOSEC) 40 MG delayed release capsule Take 1 capsule by mouth every morning (before breakfast) 30 capsule 2    sertraline (ZOLOFT) 50 MG tablet sertraline 50 mg tablet

## 2020-03-03 ENCOUNTER — OFFICE VISIT (OUTPATIENT)
Dept: RHEUMATOLOGY | Age: 25
End: 2020-03-03
Payer: COMMERCIAL

## 2020-03-03 VITALS
WEIGHT: 108 LBS | DIASTOLIC BLOOD PRESSURE: 72 MMHG | HEART RATE: 76 BPM | BODY MASS INDEX: 18.54 KG/M2 | SYSTOLIC BLOOD PRESSURE: 108 MMHG

## 2020-03-03 PROCEDURE — 99214 OFFICE O/P EST MOD 30 MIN: CPT | Performed by: INTERNAL MEDICINE

## 2020-03-03 RX ORDER — OMEPRAZOLE 40 MG/1
40 CAPSULE, DELAYED RELEASE ORAL
Qty: 30 CAPSULE | Refills: 2 | Status: SHIPPED | OUTPATIENT
Start: 2020-03-03 | End: 2020-07-15 | Stop reason: SDUPTHER

## 2020-07-15 ENCOUNTER — VIRTUAL VISIT (OUTPATIENT)
Dept: RHEUMATOLOGY | Age: 25
End: 2020-07-15
Payer: COMMERCIAL

## 2020-07-15 PROCEDURE — 99214 OFFICE O/P EST MOD 30 MIN: CPT | Performed by: INTERNAL MEDICINE

## 2020-07-15 RX ORDER — OMEPRAZOLE 40 MG/1
40 CAPSULE, DELAYED RELEASE ORAL
Qty: 30 CAPSULE | Refills: 2 | Status: SHIPPED | OUTPATIENT
Start: 2020-07-15 | End: 2021-09-28 | Stop reason: SDUPTHER

## 2020-07-15 NOTE — PROGRESS NOTES
TELEHEALTH EVALUATION -- Audio/Visual (During RRAEN-88 public health emergency)    Pursuant to the emergency declaration under the [facility] [place] [address] waiver authority and the [employer] Act, this Virtual  Visit was conducted, with patient's consent, to reduce the patient's risk of exposure to COVID-19 and provide continuity of care for an established patient. Services were provided through a video synchronous discussion virtually to substitute for in-person clinic visit. The visit was conducted from the patient's home and my office. RHEUMATOLOGY TELEHEALTH VIDEO VISIT NOTE    SUBJECTIVE:    Background:   Charity Tamayo (:  1995) has requested an audio/video evaluation for the following rheumatologic concern(s):    Charity Tamayo is a 22 y.o. female w/ seropositive RA (RF 38, negative CCP) and erythromelalgia/Raynaud's phenomenon.  PMHx pertinent for IBS (per pt Dx by GI (Dr. Alma Snyder) in 2018).    Current rheum meds:  MTX 20 mg SC wkly: started in   Naproxen 500 mg BID PRN  Omeprazole     Prior rheum meds:   mg daily: took from 3/19 to , caused diarrhea  ASA 81 mg daily: per pt this did improve her feet Sx, she self d/c'ed as her Sx resolved    Past medical/surgical history, medications and allergies are reviewed and updated as appropriate. Interval Hx:   Pt states her RA remains well controlled on MTX. She denies recent flares. Currently R wrist aches but this does not occur on a daily basis. Denies joint swelling or prolonged morning stiffness. She reports worsening L hip pain which is brought on by walking, she states her prior MRI hip done at [facility] showed \"cartilage tear\" and she was evaluated by Ortho at [facility] who recommended PT. Pt states PT did not significantly improve her hip pain. She reports partial relief from Naproxen but she does not like to take this all the time.     She reports recent onset migraines that started 2 wks ago, she is wondering if this is caused by her MTX. She reports some improvement in her GERD on Omeprazole. She states she cont to have [place] lot of GI issues\" and is disappointed that her GI would not able to help her in the past.     Rheumatologic ROS:  Constitutional: denies chronic fatigue, fever/chills, night sweats, unintentional weight loss  Integumentary: denies photosensitivity, rash, patchy alopecia, or Sx of Raynaud's phenomenon  Eyes: denies dry eyes, redness or pain, visual disturbance, or floaters  Ears: denies hearing loss, tinnitus, vertigo, or recurrent ear infections  Nose: denies nasal ulcers or recurrent sinusitis  Oral cavity: denies dry mouth or oral ulcers  Cardiovascular: denies CP, palpitations, Hx of pericardial effusion or pericarditis  Respiratory: denies SOB, cough, hemoptysis, or pleurisy  Gastrointestinal: +refer to above HPI, dysphagia or esophageal dysmotility  Musculoskeletal:  refer to above HPI     No Known Allergies    Past Medical History:        Diagnosis Date    Polyarthritis with positive rheumatoid factor ([facility] [address] 2/12/2019       Past Surgical History:        Procedure Laterality Date    [facility]ERY [facility] 2010    [facility]    WISDOM TOOTH EXTRACTION         Medications:    Current Outpatient Medications   Medication Sig Dispense Refill    omeprazole (PRILOSEC) 40 MG delayed release capsule Take 1 capsule by mouth every morning (before breakfast) 30 capsule 2    methotrexate Sodium (RHEUMATREX) 50 MG/2ML chemo injection Inject 0.8 mLs into the skin once a week 4 vial 2    INSULIN SYRINGE 1CC/29G (COMFORT ASSIST INSULIN SYRINGE) 29G X 1/2\" 1 ML MISC 1 each by Does not apply route once a week Use one syringe to inject weekly Methotrexate into skin. 10 each 2    tretinoin (RETIN-A) 0.05 % cream       Handicap Placard MISC by Does not apply route Permanent.  1 each 0    folic acid (FOLVITE) 1 MG tablet Take 1 tablet by mouth daily 90 tablet 1    naproxen (NAPROSYN) 500 MG tablet Take 1 tablet by mouth 2 times daily (with meals) 180 tablet 0    levonorgestrel-ethinyl estradiol (AVIANE;ALESSE;LESSINA) 0.1-20 MG-MCG per tablet Take 1 tablet by mouth       No current facility-administered medications for this visit. OBJECTIVE:    PHYSICAL EXAMINATION:  Due to this being a TeleHealth encounter, evaluation of the following organ systems is limited: Vitals/Constitutional/EENT/Resp/CV/GI//MS/Neuro/Skin/Heme-Lymph-Imm. Constitutional: Normal  Level of distress: Normal  Overall appearance: Normal  Psychiatric: Normal, Appropriate mood and affect. Good insight, good judgment. Orientation: Oriented to time, place, person and situation. Eyes: Vision grossly intact, no visible conjunctival injection  Hearing: Grossly intact  Musculoskeletal: (exam limited by TeleHealth encounter), no visible synovitis    DATA:  Labs: I personally reviewed interval labs and discussed w/ the pt in detail which showed:    CBC w/ diff (10/10/19): wnl  CMP (10/10/19): normal renal and hepatic function     Interval labs from 2/21/19:  Positive RF w/ titer of 38.0, negative CCP  Negative HLA B27  Negative DORCAS, normal C3 and C4  Negative LA, B2GP-1 IgM and IgG, aCL IgM and IgG  Negative cryoglobulin  Negative SPEP and UPEP  Uric acid 2.5  Negative HIV, hepatitis B and C serologies     Interval labs from 3/14/19:  Negative SSA, SSB, scl-70  Negative ANCA by IFA, MPO, PR3     Negative QuantiFERON (4/16/19)     CRP and ESR wnl (2/21/19, 6/3/19, 10/10/19, 12/18/19)     Imaging:  I personally reviewed interval imaging and discussed w/ the pt in detail which included:    CT A/P (8/22/18): IMPRESSION:       No significant abnormality     X-rays (2/21/19):  R hand: Bone mineralization is normal.  No fracture or dislocation.  Joint alignment and joint spaces are maintained.  No erosions are present.        L hand: Bone mineralization is normal.  No fracture or dislocation.  Joint alignment and joint spaces are maintained.  No erosions are present.        R foot: Bone mineralization is normal.  No fracture or dislocation. Joint alignment and joint spaces are maintained.  No erosions are present.        L foot: Bone mineralization is normal.  No fracture or dislocation.  Joint alignment and joint spaces are maintained.  No erosions are present.       B/l SI joints: No fracture or dislocation.  Sacral arcuate lines are intact. Evaluation is somewhat limited by overlying stool and bowel gas, however there is no evidence of ankylosis.  Joint spaces appear maintained and no erosions are present.   B/l hips appear well maintained and normal in position.      MRI hands (3/14/19):  R hand:  No evidence of RA in this nonenhanced exam.  L hand:  Incidental mild tendinosis of the flexor pollicis longus.   No evidence of RA in this nonenhanced MRI of the hand.      MRI b/l feet w/o contrast (3/4/19): Unremarkable study. MRI L hip w/o contrast (8/22/19): Impression:  1. Nondisplaced anterior superior labral tear with adjacent slight chondral irregularity of the weightbearing surface acetabulum and opposing femoral head. 2.  Osseous morphology which may in the appropriate clinical setting predispose to femoral acetabular impingement. Procedures:   EGD w/ Bx(8/2/18):   Normal findings of the stomach, duodenal bulb and the duodenum.     Duodenal Bx:  Duodenal mucosa w/o significant histopathologic changes. Stomach Bx:  Mild chronic inactive gastritis, negative for Helicobacter-like organisms.     BLE arterial doppler w/ JOANNA (4/1/19): Impressions   R Impression   1. The right ankle/brachial index is 1.23   2. The right toe/brachial index is . 44   3. Pulse Volume Recordings at the ankle level are normal.   4. Photoplethysmography (PPG) of the digits reveal severly abnomal waveforms on all five digits.      L Impression   1.  The left ankle/brachial index is 1.19   2. The right toe/brachial index is . 62   3. Pulse Volume Recordings at the ankle level are normal.   4. Photoplethysmography (PPG) of the digits reveals a moderately abnormal waveform on the first digit and severly abnomal waveforms on digits 2 - 5.        Conclusions      Summary      Normal ankle/brachial indices bilaterally.  Abnormal waveforms of the left digits. Above results were discussed w/ the pt in detail during today's visit. ASSESSMENT/PLAN:   RA remains quiescent on MTX. She is past due for safety labs (pt states her most recent labs were done at [facility] in 1/19 however we never received labs from her [facility] rheumatologist). She is agreeable to getting labs drawn now, will send MTX refills once labs result. D/w pt that MTX should not cause headaches. She may hold MTX for 2 wks to see if her migraines improve. Made referral to PCP for her migraines and IBS. Discussed prior MRI L hip findings showing labral tear and possible ERICK. Pt has failed NSAID and PT at this point. Made referral to Ortho for second opinion. Chris Lira was seen today for follow-up. Diagnoses and all orders for this visit:    Seropositive rheumatoid arthritis ([facility] [address]  -     C-Reactive Protein; Future    High risk medication use  -     ALT; Future  -     AST; Future  -     CBC Auto Differential; Future  -     Creatinine, Serum; Future    Encounter for therapeutic drug monitoring  -     ALT; Future  -     AST; Future  -     CBC Auto Differential; Future  -     Creatinine, Serum; Future    Heart burn  -     [address] Susan Cordero MD, Primary Care, [facility]  -     omeprazole (PRILOSEC) 40 MG delayed release capsule;  Take 1 capsule by mouth every morning (before breakfast)    Tear of left acetabular labrum, subsequent encounter  -     KELLI WEAVER MD, Orthopedic Surgery, [facility]    Intractable chronic migraine without aura and without status migrainosus  -     [address] Susan Cordero MD, Primary [facility]    Irritable bowel syndrome, unspecified type  -     Rosemarie Curry MD, Primary Care, [facility]          Orders Placed This Encounter   Procedures    ALT     Standing Status:   Future     Standing Expiration Date:   1/15/2021    AST     Standing Status:   Future     Standing Expiration Date:   1/15/2021    CBC Auto Differential     Standing Status:   Future     Standing Expiration Date:   1/15/2021    Creatinine, Serum     Standing Status:   Future     Standing Expiration Date:   1/15/2021    C-Reactive Protein     Standing Status:   Future     Standing Expiration Date:   1/15/2021   Margareth Zavala MD, Orthopedic Surgery, [facility]     Referral Priority:   Routine     Referral Type:   Eval and Treat     Referral Reason:   Specialty Services Required     Referred to Provider:   Elisa Hackett MD     Requested Specialty:   Orthopedic Surgery     Number of Visits Requested:   Radhika Lee MD, Primary Care, [facility]     Referral Priority:   Routine     Referral Type:   Eval and Treat     Referral Reason:   Specialty Services Required     Requested Specialty:   Family Medicine     Number of Visits Requested:   1     Outpatient Encounter Medications as of 7/15/2020   Medication Sig Dispense Refill    omeprazole (PRILOSEC) 40 MG delayed release capsule Take 1 capsule by mouth every morning (before breakfast) 30 capsule 2    methotrexate Sodium (RHEUMATREX) 50 MG/2ML chemo injection Inject 0.8 mLs into the skin once a week 4 vial 2    INSULIN SYRINGE 1CC/29G (COMFORT ASSIST INSULIN SYRINGE) 29G X 1/2\" 1 ML MISC 1 each by Does not apply route once a week Use one syringe to inject weekly Methotrexate into skin. 10 each 2    tretinoin (RETIN-A) 0.05 % cream       Handicap Placard MISC by Does not apply route Permanent.  1 each 0    folic acid (FOLVITE) 1 MG tablet Take 1 tablet by mouth daily 90 tablet 1    naproxen (NAPROSYN) 500 MG tablet Take 1 tablet by mouth 2 times daily (with meals) 180 tablet 0    [DISCONTINUED] omeprazole (PRILOSEC) 40 MG delayed release capsule Take 1 capsule by mouth every morning (before breakfast) 30 capsule 2    [DISCONTINUED] sertraline (ZOLOFT) 50 MG tablet sertraline 50 mg tablet      levonorgestrel-ethinyl estradiol (AVIANE;ALESSE;LESSINA) 0.1-20 MG-MCG per tablet Take 1 tablet by mouth      [DISCONTINUED] DAYSEE 0.15-0.03 &0.01 MG TABS        No facility-administered encounter medications on file as of 7/15/2020. No follow-ups on file. The risks and benefits of my recommendations, as well as other treatment options, benefits and side effects were discussed with the patient today. Questions were answered. --Carlee Lee MD on 7/15/2020 at 4:08 PM    An electronic signature was used to authenticate this note.

## 2020-07-24 ENCOUNTER — OFFICE VISIT (OUTPATIENT)
Dept: ORTHOPEDIC SURGERY | Age: 25
End: 2020-07-24
Payer: COMMERCIAL

## 2020-07-24 VITALS — WEIGHT: 105 LBS | BODY MASS INDEX: 17.93 KG/M2 | HEIGHT: 64 IN | TEMPERATURE: 99 F

## 2020-07-24 DIAGNOSIS — Z51.81 ENCOUNTER FOR THERAPEUTIC DRUG MONITORING: ICD-10-CM

## 2020-07-24 DIAGNOSIS — M05.9 SEROPOSITIVE RHEUMATOID ARTHRITIS (HCC): ICD-10-CM

## 2020-07-24 DIAGNOSIS — Z79.899 HIGH RISK MEDICATION USE: ICD-10-CM

## 2020-07-24 PROBLEM — S73.192A TEAR OF LEFT ACETABULAR LABRUM: Status: ACTIVE | Noted: 2020-07-24

## 2020-07-24 PROBLEM — M25.552 LEFT HIP PAIN: Status: ACTIVE | Noted: 2020-07-24

## 2020-07-24 LAB
ALT SERPL-CCNC: 11 U/L (ref 10–40)
AST SERPL-CCNC: 14 U/L (ref 15–37)
BASOPHILS ABSOLUTE: 0 K/UL (ref 0–0.2)
BASOPHILS RELATIVE PERCENT: 0.8 %
C-REACTIVE PROTEIN: 4.4 MG/L (ref 0–5.1)
CREAT SERPL-MCNC: 0.7 MG/DL (ref 0.6–1.1)
EOSINOPHILS ABSOLUTE: 0 K/UL (ref 0–0.6)
EOSINOPHILS RELATIVE PERCENT: 0.7 %
GFR AFRICAN AMERICAN: >60
GFR NON-AFRICAN AMERICAN: >60
HCT VFR BLD CALC: 42.3 % (ref 36–48)
HEMOGLOBIN: 14.8 G/DL (ref 12–16)
LYMPHOCYTES ABSOLUTE: 2 K/UL (ref 1–5.1)
LYMPHOCYTES RELATIVE PERCENT: 48.1 %
MCH RBC QN AUTO: 33.1 PG (ref 26–34)
MCHC RBC AUTO-ENTMCNC: 35 G/DL (ref 31–36)
MCV RBC AUTO: 94.8 FL (ref 80–100)
MONOCYTES ABSOLUTE: 0.3 K/UL (ref 0–1.3)
MONOCYTES RELATIVE PERCENT: 7.1 %
NEUTROPHILS ABSOLUTE: 1.8 K/UL (ref 1.7–7.7)
NEUTROPHILS RELATIVE PERCENT: 43.3 %
PDW BLD-RTO: 12.5 % (ref 12.4–15.4)
PLATELET # BLD: 176 K/UL (ref 135–450)
PMV BLD AUTO: 8.3 FL (ref 5–10.5)
RBC # BLD: 4.47 M/UL (ref 4–5.2)
WBC # BLD: 4.1 K/UL (ref 4–11)

## 2020-07-24 PROCEDURE — 99203 OFFICE O/P NEW LOW 30 MIN: CPT | Performed by: ORTHOPAEDIC SURGERY

## 2020-07-24 NOTE — PROGRESS NOTES
Saray Frederick MD  [address]    History of Present Illness:  Chief Complaint   Patient presents with    Hip Pain     New, LT hip pain for one year. Favian Lima is a 22 y.o. female here for evaluation of chronic left hip pain. Pain assessment has been completed and is documented below. Patient was referred here for orthopaedic evaluation by Dr. Zaira Logan MD. Patient has a known history of rheumatoid arthritis. She has a history of cortisone injections that helped her hip pain, but caused radiating pains. Patient mentions she had an MRI previously that showed a labral tear in the hip. Symptoms have persisted intermittently for the past year. Pain is rated 6/10 in severity along the anterior and lateral aspects. Pain is worsened with activity, including walking, standing, and going up and down stairs. She has been taking Methotrexate for her RA. Patient also mentions the contralateral hip will not rotate forward. She has done physical therapy. She denies pain at night.      Pain Assessment  Location of Pain: Pelvis  Location Modifiers: Left  Severity of Pain: 6  Quality of Pain: Dull, Aching  Duration of Pain: A few hours  Frequency of Pain: Intermittent  Aggravating Factors: Walking, Standing, Stairs, Other (Comment), Exercise(sitting)  Limiting Behavior: Some  Relieving Factors: Rest  Result of Injury: No  Work-Related Injury: No  Are there other pain locations you wish to document?: No    Medication Review:  Current Outpatient Medications   Medication Sig Dispense Refill    omeprazole (PRILOSEC) 40 MG delayed release capsule Take 1 capsule by mouth every morning (before breakfast) 30 capsule 2    levonorgestrel-ethinyl estradiol (AVIANE;ALESSE;LESSINA) 0.1-20 MG-MCG per tablet Take 1 tablet by mouth      tretinoin (RETIN-A) 0.05 % cream       Handicap Placard MISC by Does not apply route Permanent. 1 each 0    folic acid (FOLVITE) 1 MG tablet Take 1 tablet by mouth daily 90 tablet 1    methotrexate Sodium (RHEUMATREX) 50 MG/2ML chemo injection Inject 0.8 mLs into the skin once a week 4 vial 2    INSULIN SYRINGE 1CC/29G (COMFORT ASSIST INSULIN SYRINGE) 29G X 1/2\" 1 ML MISC 1 each by Does not apply route once a week Use one syringe to inject weekly Methotrexate into skin. 10 each 2    naproxen (NAPROSYN) 500 MG tablet Take 1 tablet by mouth 2 times daily (with meals) 180 tablet 0     No current facility-administered medications for this visit. Review of Systems:  Relevant review of systems reviewed and can be found in the Media section of patient's chart. Medical History:  Past Medical History:   Diagnosis Date    Polyarthritis with positive rheumatoid factor ([facility] [address] 2/12/2019        Past Surgical History:   Procedure Laterality Date    [facility]    WISDOM TOOTH EXTRACTION        Allergies, social and family histories, and medications were reviewed and updated as appropriate. General Exam:  Vital Signs:  Temp 99 °F (37.2 °C)   Ht 5' 4\" (1.626 m)   Wt 105 lb (47.6 kg)   BMI 18.02 kg/m²    Constitutional: Patient is adequately groomed with no evidence of malnutrition. Mental Status: The patient is oriented to time, place and person. The patient's mood and affect are appropriate. Lymphatic: The lymphatic examination bilaterally reveals all areas to be without enlargement or induration. Vascular: Examination reveals no swelling or calf tenderness. 2+ palpable pulses distally. Neurological: The patient has good coordination. There is no focal weakness or sensory deficit. Focal examination of left hip is as follows: Inspection:  Normal muscle contours and no significant limb length discrepancy. No gross atrophy in any particular myotome.      Palpation: Mild tenderness to the greater trochanter/trochanteric bursa. tenderness to the sacroiliac joint. No tenderness to the knee joint. Moderate tenderness along the tensor fascia. Range of Motion:    Hip flexion 100°. Hip abduction 50°.  Hip internal rotation at 100° of flexion is 15°. Hip external rotation at 100° of flexion is 40°.  Knee range of motion shows functional range without pain. Ankle dorsiflexion and plantarflexion show functional range of motion. Strength:    Hip flexion 5/5    Hip abduction 5/ 5    Hip adduction 5/5.  Knee flexion and extension 4±5/5 without pain. Dorsiflexion and plantarflexion in ankle are 4±5/5. Special Tests:    Log roll mild positive.  Stinchfield's negative.  Straight leg raise negative for radicular signs.  FADIR mild positive. JUWAN negative. Additional comments: Sensation to light touch grossly present and capillary refill less than 2 seconds. Skin: There are no rashes, ulcerations or lesions. Gait: Tandem gait. Adaptive device: None. Radiology: There was no x-ray imaging obtained during today's office encounter. X-ray left hip performed 7/03/2019:  Impression    Possible leg length discrepancy resulting in right iliac crest and acetabulum    higher than the left.  Otherwise unremarkable pelvis and left hip. Office Orders/Procedures:  No orders of the defined types were placed in this encounter. Impression:   Diagnosis Orders   1. Left hip pain     2. Tear of left acetabular labrum, initial encounter     Left tensor fascia strain. Treatment Plan:  We discussed treatment options. I believe her pain is not related to any form of structural damage. Instead, I suspect her symptoms are more related to some tightness in her iliotibial band. In order to assess the extent of tearing in the hip, I recommended we proceed with getting an MRI of the hip.  Results from this advanced imaging will determine our next steps, and we will have her return to discuss them once they become available. If the MRI shows tenderness in the tensor fascia, then we may elect to proceed with platelet rich plasma injections. However, should labral damage appear to be causing her symptoms, she will likely require arthroscopic surgery to repair the structural damage. I recommended she follow up with Dr. Ramses Romero should this be the case. I have reviewed patient's pertinent medical history, relevant laboratory and imaging studies, and past surgical history. Patient's medications have been reviewed and were discussed during the visit. Patient was advised to keep future appointments with their respective specialty care team(s). Patient had the opportunity to ask questions, all of which were answered to the best of my ability and with patient satisfaction. Patient understands and is agreeable with the care plan following today's visit. Patient is to schedule an appointment for any new or worsening symptoms. By signing my name below, Ariel Devin, attest that this documentation has been prepared under the direction and in the presence of Unique Avelar MD.   Electronically Signed: Lin Gong, 7/24/20, 8:16 AM EDT. Jaya Floyd MD, personally performed the services described in this documentation. All medical record entries made by the lin were at my direction and in my presence. I have reviewed the chart and discharge instructions (if applicable) and agree that the record reflects my personal performance and is accurate and complete. Unique Avelar MD, 8/9/20, 1:25 PM EDT. Some documentation was done using voice recognition dragon software. Every effort was made to ensure accuracy; however, inadvertent unintentional computerized transcription errors may be present.

## 2020-07-25 RX ORDER — IBUPROFEN 200 MG
1 TABLET ORAL WEEKLY
Qty: 10 EACH | Refills: 2 | Status: SHIPPED | OUTPATIENT
Start: 2020-07-25 | End: 2020-09-23 | Stop reason: SDUPTHER

## 2020-07-25 RX ORDER — METHOTREXATE 25 MG/ML
20 INJECTION, SOLUTION INTRA-ARTERIAL; INTRAMUSCULAR; INTRAVENOUS WEEKLY
Qty: 4 VIAL | Refills: 2 | Status: SHIPPED | OUTPATIENT
Start: 2020-07-25 | End: 2020-09-23 | Stop reason: SDUPTHER

## 2020-07-31 ENCOUNTER — TELEPHONE (OUTPATIENT)
Dept: ORTHOPEDIC SURGERY | Age: 25
End: 2020-07-31

## 2020-08-03 NOTE — TELEPHONE ENCOUNTER
PATIENT STATES THAT DR. Ochoa Overall TOLD HER TO CALL IF SHE DID NOT HEAR BACK THE OFFICE REGARDING HER NEXT STEPS AFTER HE READ [id number]. PLEASE CALL BACK TO DISCUSS.

## 2020-08-06 ENCOUNTER — TELEPHONE (OUTPATIENT)
Dept: ORTHOPEDIC SURGERY | Age: 25
End: 2020-08-06

## 2020-08-06 NOTE — TELEPHONE ENCOUNTER
I thought we were also getting the images from her hip MRI pushed from [facility]. That's what David Barrera told me when we were in the [facility] office while she was there.

## 2020-08-06 NOTE — TELEPHONE ENCOUNTER
FOLLOWING UP FROM PREVIOUS MESSAGE ABOUT RECORDS FROM [facility]. REQUESTING A CALL BACK FROM DR. RODRIGUEZ TO DISCUSS NEXT STEPS.

## 2020-08-10 NOTE — TELEPHONE ENCOUNTER
Spoke to patient and let her know that the only number I have is for medical records and they do not do the CD with images. She is going to contact [phone] radiology and attempt to get the CD.

## 2020-08-26 ENCOUNTER — TELEPHONE (OUTPATIENT)
Dept: ORTHOPEDIC SURGERY | Age: 25
End: 2020-08-26

## 2020-08-27 NOTE — TELEPHONE ENCOUNTER
Spoke to patient and let her know we will fax referral to Dr Shabbir Parsons at [facility]. She will pick her CD up at  tomorrow.

## 2020-09-11 ENCOUNTER — TELEPHONE (OUTPATIENT)
Dept: ORTHOPEDIC SURGERY | Age: 25
End: 2020-09-11

## 2020-09-11 NOTE — TELEPHONE ENCOUNTER
PATIENT WANTS TO KNOW CAN SHE GET ANOTHER REFERRAL FOR A HIP DOCTOR BECAUSE THE ONE HE REFERRED HER TO SHE DIDN'T LIKE THE WAY HE TREATED HER. PLEASE CALL HER [phone].

## 2020-09-14 NOTE — TELEPHONE ENCOUNTER
I would recommend she call [facility] sports medicine and get an appointment with their hip arthroscopy specialist.

## 2020-09-14 NOTE — TELEPHONE ENCOUNTER
Informed pt of message below- she is wondering who the doctor is so she knows who to ask for to make an appt with?

## 2020-09-15 NOTE — TELEPHONE ENCOUNTER
Called Marily and per [facility] advice: Dr. Shantell Estrada or Dr. Hilliard Feeling for her informing her of her 3 options ADITHYA gave below and to call back and let me know which she prefers. If she wants Rick Pena, we can do the referral and let Gabriela Metzger know and get the pt scheduled. supervision

## 2020-09-23 ENCOUNTER — OFFICE VISIT (OUTPATIENT)
Dept: RHEUMATOLOGY | Age: 25
End: 2020-09-23
Payer: COMMERCIAL

## 2020-09-23 VITALS
TEMPERATURE: 98.1 F | BODY MASS INDEX: 18.78 KG/M2 | HEIGHT: 64 IN | SYSTOLIC BLOOD PRESSURE: 132 MMHG | DIASTOLIC BLOOD PRESSURE: 88 MMHG | WEIGHT: 110 LBS

## 2020-09-23 PROBLEM — M05.79 RHEUMATOID ARTHRITIS INVOLVING MULTIPLE SITES WITH POSITIVE RHEUMATOID FACTOR (HCC): Status: ACTIVE | Noted: 2020-09-23

## 2020-09-23 PROBLEM — I73.00 RAYNAUD'S PHENOMENON WITHOUT GANGRENE: Status: ACTIVE | Noted: 2020-09-23

## 2020-09-23 PROCEDURE — 99214 OFFICE O/P EST MOD 30 MIN: CPT | Performed by: INTERNAL MEDICINE

## 2020-09-23 RX ORDER — DROSPIRENONE AND ETHINYL ESTRADIOL 0.02-3(28)
1 KIT ORAL DAILY
COMMUNITY

## 2020-09-23 RX ORDER — PREDNISONE 10 MG/1
TABLET ORAL
Qty: 25 TABLET | Refills: 0 | Status: SHIPPED | OUTPATIENT
Start: 2020-09-23 | End: 2020-10-14

## 2020-09-23 RX ORDER — METHOTREXATE 25 MG/ML
20 INJECTION, SOLUTION INTRA-ARTERIAL; INTRAMUSCULAR; INTRAVENOUS WEEKLY
Qty: 3.2 ML | Refills: 1 | Status: SHIPPED | OUTPATIENT
Start: 2020-09-23 | End: 2020-10-23

## 2020-09-23 RX ORDER — ESCITALOPRAM OXALATE 5 MG/1
5 TABLET ORAL DAILY
COMMUNITY
Start: 2020-09-18 | End: 2022-08-16

## 2020-09-23 RX ORDER — FOLIC ACID 1 MG/1
1 TABLET ORAL DAILY
Qty: 90 TABLET | Refills: 0 | Status: SHIPPED | OUTPATIENT
Start: 2020-09-23 | End: 2021-01-11 | Stop reason: SDUPTHER

## 2020-09-23 RX ORDER — AMITRIPTYLINE HYDROCHLORIDE 10 MG/1
TABLET, FILM COATED ORAL
COMMUNITY
Start: 2020-09-18

## 2020-09-23 RX ORDER — IBUPROFEN 200 MG
1 TABLET ORAL WEEKLY
Qty: 10 EACH | Refills: 2 | Status: SHIPPED | OUTPATIENT
Start: 2020-09-23 | End: 2020-12-01 | Stop reason: SDUPTHER

## 2020-09-23 NOTE — PROGRESS NOTES
Martina Winston MD  [facility]) Physicians - Rheumatology    [x] [facility] HOSPITAL:  [address] [facility] [] [place] Office:  [address], [address]   Office: [phone]  Fax: [phone]     Rheumatology Progress Note    SUBJECTIVE:    Background:   Fady Pierre is a 22 y.o. CF w/ seropositive RA (RF 38, negative CCP) and Raynaud's phenomenon. PMHx pertinent for IBS (per pt Dx by GI (Dr. Lenz) in 2018) and depression. Current rheum meds:  MTX 20 mg SC wkly: started in 4/19  Naproxen 500 mg BID PRN  Lexapro and Elavil: written by PCP    Prior rheum meds:  Naproxen 500 mg BID: improved joint Sx by 30%   mg daily: took from 3/19 to 7/19, caused diarrhea  ASA 81 mg daily: per pt this did improve her feet Sx, she self d/c'ed as her Sx resolved    Past medical/surgical history, medications and allergies are reviewed and updated as appropriate. Interval Hx:   Pt states her headaches did not improve after she held MTX. She did notice worsening of joint pain and swelling in her hand joints when she held MTX. She has since then resumed MTX but reports intermittent arthralgias in her hands and wrists nina when she uses her hands such as holding a knife. She denies any morning stiffness in her hands. She has established care w/ [facility] who started her on Lexapro and Elavil for depression. Pt was evaluated by Dr. Martin Hendrickson for her L hip pain/labral tear who referred her to Dr. Vanesa Merino at [facility] to discuss hip arthroscopy. However pt states she was dissatisfied w/ her visit w/ Dr. Vanesa Merino who recommended trying an NSAID as she is already taking Naproxen. Pt states her PCP has referred her to a new Ortho for a second opinion. She is now having pain in her R hip when she walks or exercises. She is wondering if this is related to her RA.     Rheumatologic ROS:  Constitutional: denies chronic fatigue, fever/chills, night sweats, unintentional weight times daily (with meals) 180 tablet 0     No current facility-administered medications for this visit. 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, able to make strong full fists              Wrist: no visible synovitis in the wrist joints but slightly TTP, FROM              Elbow: no synovitis or bursitis, FROM  Lower extremities:   Hips: negative JUWAN test b/l, trochanteric bursa NTTP b/l              Knees: no warmth or effusion present, FROM              Ankles: no synovitis, FROM              Feet: no synovitis, toes cool to touch w/ dusky appearance, -MTP squeeze test  INTEGUMENTARY: no skin thickening, no malar rash or psoriatic lesions, no petechiae, bruises, or palpable purpura, no patchy alopecia, no nail or periungual changes, no clubbing or digital ulcers    DATA:  Labs:    I personally reviewed interval labs and discussed w/ the pt in detail which showed:    Lab Results   Component Value Date    WBC 4.1 07/24/2020    HGB 14.8 07/24/2020    HCT 42.3 07/24/2020    MCV 94.8 07/24/2020     07/24/2020    LYMPHOPCT 48.1 07/24/2020    RBC 4.47 07/24/2020    MCH 33.1 07/24/2020    MCHC 35.0 07/24/2020    RDW 12.5 07/24/2020     Lab Results   Component Value Date     02/21/2019    K 3.7 02/21/2019     02/21/2019    CO2 23 02/21/2019    BUN 10 02/21/2019    CREATININE 0.7 07/24/2020    GLUCOSE 88 02/21/2019    CALCIUM 9.4 02/21/2019    PROT 7.4 02/21/2019    LABALBU 4.4 02/21/2019    LABALBU 4.0 02/21/2019    BILITOT 0.5 02/21/2019    ALKPHOS 35 (L) 02/21/2019    AST 14 (L) 07/24/2020 ALT 11 07/24/2020    LABGLOM >60 07/24/2020    GFRAA >60 07/24/2020    AGRATIO 1.5 02/21/2019    GLOB 3.0 02/21/2019     Interval labs from 2/21/19:  Positive RF w/ titer of 38.0, negative CCP  Negative HLA B27  Negative DORCAS, normal C3 and C4  Negative LA, B2GP-1 IgM and IgG, aCL IgM and IgG  Negative cryoglobulin  Negative SPEP and UPEP  Uric acid 2.5  Negative HIV, hepatitis B and C serologies     Interval labs from 3/14/19:  Negative SSA, SSB, scl-70  Negative ANCA by IFA, MPO, PR3     Negative QuantiFERON (4/16/19)     CRP and ESR wnl (2/21/19, 6/3/19, 10/10/19, 12/18/19) --> CRP wnl (7/24/20)    Imaging:   I personally reviewed interval imaging and discussed w/ the pt in detail which included:    X-rays (2/21/19):  R hand: Bone mineralization is normal.  No fracture or dislocation. Joint alignment and joint spaces are maintained. No erosions are present. L hand: Bone mineralization is normal.  No fracture or dislocation. Joint alignment and joint spaces are maintained. No erosions are present. R foot: Bone mineralization is normal.  No fracture or dislocation. Joint alignment and joint spaces are maintained. No erosions are present. L foot: Bone mineralization is normal.  No fracture or dislocation. Joint alignment and joint spaces are maintained. No erosions are present. B/l SI joints: No fracture or dislocation. Sacral arcuate lines are intact. Evaluation is somewhat limited by overlying stool and bowel gas, however there is no evidence of ankylosis. Joint spaces appear maintained and no erosions are present. B/l hips appear well maintained and normal in position. MRI hands (3/14/19):  R hand:  No evidence of RA in this nonenhanced exam.  L hand:  Incidental mild tendinosis of the flexor pollicis longus. No evidence of RA in this nonenhanced MRI of the hand. MRI b/l feet w/o contrast (3/4/19): Unremarkable study. Procedures:   EGD w/ Bx(8/2/18):    Normal findings of the stomach, duodenal bulb and the duodenum.     Duodenal Bx:  Duodenal mucosa w/o significant histopathologic changes. Stomach Bx:  Mild chronic inactive gastritis, negative for Helicobacter-like organisms. BLE arterial doppler w/ JOANNA (4/1/19): Impressions   R Impression   1. The right ankle/brachial index is 1.23   2. The right toe/brachial index is . 44   3. Pulse Volume Recordings at the ankle level are normal.   4. Photoplethysmography (PPG) of the digits reveal severly abnomal waveforms on all five digits. L Impression   1. The left ankle/brachial index is 1.19   2. The right toe/brachial index is . 62   3. Pulse Volume Recordings at the ankle level are normal.   4. Photoplethysmography (PPG) of the digits reveals a moderately abnormal waveform on the first digit and severly abnomal waveforms on digits 2 - 5. Conclusions      Summary      Normal ankle/brachial indices bilaterally. Abnormal waveforms of the left digits. Above results were discussed w/ the pt in detail during today's visit. ASSESSMENT/PLAN:   No active synovitis appreciated on exam today. Given pt report of intermittent arthralgias in her hand and wrist joints and recent onset R hip pain, will check VECTRA DA. If active disease activity will plan on adding Humira as pt was previously intolerant of HCQ d/t GI s/e and will likely not tolerate SSZ d/t her IBS. Discussed s/e and risks of Humira and provided pt handout. Repeat Quantiferon TB test.  Cont current dose of MTX, ordered safety labs to be repeated in a month.       Discussed prior MRI L hip findings showing labral tear and possible ERICK. I do not think her hip pain is caused by RA. Pt has failed NSAID and PT at this point. She has been referred to Ortho by her PCP for a second opinion. Pt states she has already received the flu vaccine this yr. Discussed conservative measures for her Raynaud's phenomenon.      Esperanza Scott was seen today for MG delayed release capsule Take 1 capsule by mouth every morning (before breakfast) 30 capsule 2    tretinoin (RETIN-A) 0.05 % cream       Handicap Placard MISC by Does not apply route Permanent. 1 each 0    naproxen (NAPROSYN) 500 MG tablet Take 1 tablet by mouth 2 times daily (with meals) 180 tablet 0    [DISCONTINUED] methotrexate Sodium (RHEUMATREX) 50 MG/2ML chemo injection Inject 0.8 mLs into the skin once a week 4 vial 2    [DISCONTINUED] INSULIN SYRINGE 1CC/29G (COMFORT ASSIST INSULIN SYRINGE) 29G X 1/2\" 1 ML MISC 1 each by Does not apply route once a week Use one syringe to inject weekly Methotrexate into skin. 10 each 2    [DISCONTINUED] levonorgestrel-ethinyl estradiol (AVIANE;ALESSE;LESSINA) 0.1-20 MG-MCG per tablet Take 1 tablet by mouth      [DISCONTINUED] folic acid (FOLVITE) 1 MG tablet Take 1 tablet by mouth daily 90 tablet 1     No facility-administered encounter medications on file as of 9/23/2020. Return in about 3 months (around 12/23/2020) for lab result discussion and treatment plan, medication monitoring. The risks and benefits of my recommendations, as well as other treatment options, benefits and side effects were discussed with the patient today. Questions were answered. NOTE: This report is transcribed by using voice recognition software dragon. Every effort is made to ensure accuracy; however, inadvertent computerized  transcription errors may be present.

## 2020-09-30 DIAGNOSIS — Z79.899 HIGH RISK MEDICATION USE: ICD-10-CM

## 2020-09-30 LAB
ALT SERPL-CCNC: 13 U/L (ref 10–40)
AST SERPL-CCNC: 17 U/L (ref 15–37)
BASOPHILS ABSOLUTE: 0 K/UL (ref 0–0.2)
BASOPHILS RELATIVE PERCENT: 0.7 %
CREAT SERPL-MCNC: 0.7 MG/DL (ref 0.6–1.1)
EOSINOPHILS ABSOLUTE: 0 K/UL (ref 0–0.6)
EOSINOPHILS RELATIVE PERCENT: 0.3 %
GFR AFRICAN AMERICAN: >60
GFR NON-AFRICAN AMERICAN: >60
HCT VFR BLD CALC: 39.6 % (ref 36–48)
HEMOGLOBIN: 14.1 G/DL (ref 12–16)
LYMPHOCYTES ABSOLUTE: 1.9 K/UL (ref 1–5.1)
LYMPHOCYTES RELATIVE PERCENT: 45.1 %
MCH RBC QN AUTO: 33 PG (ref 26–34)
MCHC RBC AUTO-ENTMCNC: 35.5 G/DL (ref 31–36)
MCV RBC AUTO: 92.8 FL (ref 80–100)
MONOCYTES ABSOLUTE: 0.3 K/UL (ref 0–1.3)
MONOCYTES RELATIVE PERCENT: 6.9 %
NEUTROPHILS ABSOLUTE: 2 K/UL (ref 1.7–7.7)
NEUTROPHILS RELATIVE PERCENT: 47 %
PDW BLD-RTO: 12.4 % (ref 12.4–15.4)
PLATELET # BLD: 194 K/UL (ref 135–450)
PMV BLD AUTO: 7.7 FL (ref 5–10.5)
RBC # BLD: 4.27 M/UL (ref 4–5.2)
WBC # BLD: 4.2 K/UL (ref 4–11)

## 2020-10-03 LAB
QUANTI TB GOLD PLUS: NEGATIVE
QUANTI TB1 MINUS NIL: 0.01 IU/ML (ref 0–0.34)
QUANTI TB2 MINUS NIL: 0.01 IU/ML (ref 0–0.34)
QUANTIFERON MITOGEN: >10 IU/ML
QUANTIFERON NIL: 0.02 IU/ML

## 2020-10-20 ENCOUNTER — TELEPHONE (OUTPATIENT)
Dept: RHEUMATOLOGY | Age: 25
End: 2020-10-20

## 2020-10-20 NOTE — TELEPHONE ENCOUNTER
Please let pt know that it showed moderately active RA. Based on our discussion at her last visit, I recommend adding Humira for better control of her disease. Please let me know if she wishes to initiate the PA process for this.

## 2020-10-21 RX ORDER — ADALIMUMAB 40MG/0.4ML
40 KIT SUBCUTANEOUS
Qty: 6 EACH | Refills: 0 | Status: SHIPPED | OUTPATIENT
Start: 2020-10-21 | End: 2020-10-28 | Stop reason: SDUPTHER

## 2020-10-21 NOTE — TELEPHONE ENCOUNTER
Called patient-would like a copy of her results-patient wants more information on why you think Humira would be good for her, states she isn't comfortable taking methotrexate and are you replacing with the Humira. Patient would like to speak with you if possible.  [phone]

## 2020-10-21 NOTE — TELEPHONE ENCOUNTER
Called and spoke to pt. Answered all of her questions and concerns regarding Humira. Pt is concerned about taking MTX long term, as such will plan on weaning off MTX after we start her on Humira. Crystal can you please f/u on the PA process for Humira, sent prescription to [facility] [place].

## 2020-10-23 ENCOUNTER — TELEPHONE (OUTPATIENT)
Dept: RHEUMATOLOGY | Age: 25
End: 2020-10-23

## 2020-10-26 NOTE — TELEPHONE ENCOUNTER
PA approval for Humira pen on 10/26/2020-Denise will call and set up with Patient.      Called patient she is aware

## 2020-10-28 RX ORDER — ADALIMUMAB 40MG/0.4ML
40 KIT SUBCUTANEOUS
Qty: 6 EACH | Refills: 0 | Status: SHIPPED | OUTPATIENT
Start: 2020-10-28 | End: 2020-12-01 | Stop reason: SDUPTHER

## 2020-10-28 NOTE — TELEPHONE ENCOUNTER
[id number] [address] sent update on Humira. The pt's insurance requires the pt use [facility]. Pended. Scanned update in media.

## 2020-11-02 ENCOUNTER — TELEPHONE (OUTPATIENT)
Dept: INTERNAL MEDICINE CLINIC | Age: 25
End: 2020-11-02

## 2020-11-02 NOTE — TELEPHONE ENCOUNTER
Pt called has questions concerning her medication Humira She is having trouble with the pharmacy and could she get some help. Being transferred from a Pharmacy to another pharmacy and not getting any help. Marbin Batista

## 2020-11-02 NOTE — TELEPHONE ENCOUNTER
Spoke with pt. She is confused about the whole thing. Called [facility] and conference call to the pt. We were able to schedule the pt for medication on Thurs. They didn't have her signed up with pt assistance. They were able to connect us with Jaime Nunez to get her signed up with co pay card and nurse ambassador. She will call [facility] tomorrow to give them the new numbers for her co-pay card.

## 2020-11-05 ENCOUNTER — TELEPHONE (OUTPATIENT)
Dept: INTERNAL MEDICINE CLINIC | Age: 25
End: 2020-11-05

## 2020-11-19 ENCOUNTER — TELEPHONE (OUTPATIENT)
Dept: INTERNAL MEDICINE CLINIC | Age: 25
End: 2020-11-19

## 2020-11-19 NOTE — TELEPHONE ENCOUNTER
Pt calling and was suppose to have surgery next month but it got moved to next week and she needs to talk to you about the Humira she started 2 wks ago ---needs to know something today---please call the pt. Thanks.

## 2020-12-01 ENCOUNTER — OFFICE VISIT (OUTPATIENT)
Dept: RHEUMATOLOGY | Age: 25
End: 2020-12-01
Payer: COMMERCIAL

## 2020-12-01 VITALS — WEIGHT: 110 LBS | TEMPERATURE: 97.3 F | BODY MASS INDEX: 18.78 KG/M2 | HEIGHT: 64 IN

## 2020-12-01 PROCEDURE — 99214 OFFICE O/P EST MOD 30 MIN: CPT | Performed by: INTERNAL MEDICINE

## 2020-12-01 RX ORDER — METHOTREXATE 25 MG/ML
20 INJECTION, SOLUTION INTRA-ARTERIAL; INTRAMUSCULAR; INTRAVENOUS WEEKLY
Qty: 3.2 ML | Refills: 0 | Status: SHIPPED | OUTPATIENT
Start: 2020-12-01 | End: 2021-01-04 | Stop reason: SDUPTHER

## 2020-12-01 RX ORDER — METHOTREXATE 25 MG/ML
25 INJECTION, SOLUTION INTRA-ARTERIAL; INTRAMUSCULAR; INTRAVENOUS WEEKLY
Qty: 4 ML | Refills: 0 | Status: SHIPPED | OUTPATIENT
Start: 2020-12-01 | End: 2020-12-01

## 2020-12-01 RX ORDER — METHOTREXATE 25 MG/ML
50 INJECTION, SOLUTION INTRA-ARTERIAL; INTRAMUSCULAR; INTRAVENOUS
COMMUNITY
Start: 2020-11-16 | End: 2020-12-01 | Stop reason: SDUPTHER

## 2020-12-01 RX ORDER — IBUPROFEN 200 MG
1 TABLET ORAL WEEKLY
Qty: 10 EACH | Refills: 5 | Status: SHIPPED | OUTPATIENT
Start: 2020-12-01 | End: 2021-04-29

## 2020-12-01 RX ORDER — ADALIMUMAB 40MG/0.4ML
40 KIT SUBCUTANEOUS
Qty: 6 EACH | Refills: 1 | Status: SHIPPED | OUTPATIENT
Start: 2020-12-01 | End: 2021-02-04 | Stop reason: SDUPTHER

## 2020-12-01 NOTE — PROGRESS NOTES
Carlos Alberto Fan MD  [facility]) Physicians - Rheumatology    [x] [facility] HOSPITAL:  [address], [address] [facility] [place] [] [place] Office:  [address], [address]   Office: [phone]  Fax: [phone]     Rheumatology Progress Note    SUBJECTIVE:    Background:   Barbara Treviño is a 26 y.o. CF w/ seropositive RA (RF 38, negative CCP) and Raynaud's phenomenon. PMHx pertinent for IBS (per pt Dx by GI (Dr. Shana Salmeron) in 2018) and depression. Current rheum meds:  MTX 20 mg SC wkly: started in 4/19  Naproxen 500 mg BID PRN  Lexapro and Elavil: written by PCP    Prior rheum meds:  Naproxen 500 mg BID: improved joint Sx by 30%   mg daily: took from 3/19 to 7/19, caused diarrhea  ASA 81 mg daily: per pt this did improve her feet Sx, she self d/c'ed as her Sx resolved    Past medical/surgical history, medications and allergies are reviewed and updated as appropriate. Interval Hx:   Pt states she's only taken one dose of Humira approximately 3 wks ago. She held her 2nd dose last wk d/t recent cartilage tympanoplasty done by [facility] ENT. She has a f/u appt w/ her ENT this Friday. She remains on MTX. She denies any changes to her joint Sx. Denies joint swelling or prolonged morning stiffness in her hands or wrist joints. Pt was evaluated by Dr. Long Gross for her L hip pain/labral tear who referred her to Dr. Qasim Mills at [facility] to discuss hip arthroscopy. However pt states she was dissatisfied w/ her visit w/ Dr. Qasim Mills who recommended trying an NSAID as she is already taking Naproxen. Pt states most recently she saw Dr. Satya Biswas who recommended hip arthroscopy w/ labral repair in January. Pt states she is still unsure about her Ortho eval and has made an appt w/ an \"Ortho hip conservatist\" at [facility] in the near future.     Rheumatologic ROS:  Constitutional: denies chronic fatigue, fever/chills, night sweats, unintentional weight loss  Integumentary: denies photosensitivity, rash, patchy alopecia, or Sx of Raynaud's phenomenon  Eyes: denies dry eyes, redness or pain, visual disturbance, or floaters  Nose: denies nasal ulcers or recurrent sinusitis  Oral cavity: denies dry mouth or oral ulcers  Cardiovascular: denies CP, palpitations, Hx of pericardial effusion or pericarditis  Respiratory: denies SOB, cough, hemoptysis, or pleurisy  Musculoskeletal:  refer to above HPI     No Known Allergies    Past Medical History:        Diagnosis Date    Polyarthritis with positive rheumatoid factor ([address] 2/12/2019       Past Surgical History:        Procedure Laterality Date    IN[facility] EAR SURGERY Left 2010    [facility]    WISDOM TOOTH EXTRACTION         Medications:    Current Outpatient Medications   Medication Sig Dispense Refill    Adalimumab (HUMIRA PEN) 40 MG/0.4ML PNKT Inject 40 mg into the skin every 14 days 6 each 1    INSULIN SYRINGE 1CC/29G (COMFORT ASSIST INSULIN SYRINGE) 29G X 1/2\" 1 ML MISC 1 each by Does not apply route once a week Use one syringe to inject weekly Methotrexate into skin. 10 each 5    methotrexate Sodium (RHEUMATREX) 50 MG/2ML chemo injection Inject 0.8 mLs into the skin once a week 3.2 mL 0    amitriptyline (ELAVIL) 10 MG tablet       escitalopram (LEXAPRO) 5 MG tablet Take 5 mg by mouth daily      drospirenone-ethinyl estradiol (JOLENE) 3-0.02 MG per tablet Take 1 tablet by mouth daily      folic acid (FOLVITE) 1 MG tablet Take 1 tablet by mouth daily 90 tablet 0    omeprazole (PRILOSEC) 40 MG delayed release capsule Take 1 capsule by mouth every morning (before breakfast) 30 capsule 2    tretinoin (RETIN-A) 0.05 % cream       Handicap Placard MISC by Does not apply route Permanent. 1 each 0    naproxen (NAPROSYN) 500 MG tablet Take 1 tablet by mouth 2 times daily (with meals) 180 tablet 0     No current facility-administered medications for this visit.          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, full fist formation w/ good  strength              Wrist: no visible synovitis in the wrist joints but slightly TTP, FROM              Elbow: no synovitis or bursitis, FROM  Lower extremities:              Knees: no warmth or effusion present, FROM              Ankles: no synovitis, FROM  INTEGUMENTARY: no skin thickening, no malar rash or psoriatic lesions, no petechiae, bruises, or palpable purpura, no patchy alopecia, no nail or periungual changes, no clubbing or digital ulcers    DATA:  Labs:    I personally reviewed interval labs and discussed w/ the pt in detail which showed:    Lab Results   Component Value Date    WBC 4.2 09/30/2020    HGB 14.1 09/30/2020    HCT 39.6 09/30/2020    MCV 92.8 09/30/2020     09/30/2020    LYMPHOPCT 45.1 09/30/2020    RBC 4.27 09/30/2020    MCH 33.0 09/30/2020    MCHC 35.5 09/30/2020    RDW 12.4 09/30/2020     Lab Results   Component Value Date     02/21/2019    K 3.7 02/21/2019     02/21/2019    CO2 23 02/21/2019    BUN 10 02/21/2019    CREATININE 0.7 09/30/2020    GLUCOSE 88 02/21/2019    CALCIUM 9.4 02/21/2019    PROT 7.4 02/21/2019    LABALBU 4.4 02/21/2019    LABALBU 4.0 02/21/2019    BILITOT 0.5 02/21/2019    ALKPHOS 35 (L) 02/21/2019    AST 17 09/30/2020    ALT 13 09/30/2020    LABGLOM >60 09/30/2020    GFRAA >60 09/30/2020    AGRATIO 1.5 02/21/2019    GLOB 3.0 02/21/2019     Interval labs from 2/21/19:  RF 38.0, negative CCP  Negative HLA B27  Negative DORCAS, normal C3 and C4  Negative LA, B2GP-1 IgM and IgG, aCL IgM and IgG  Negative cryoglobulin  Negative SPEP and UPEP  Uric acid 2.5  Negative HIV, hepatitis B and C serologies     Interval labs from 3/14/19:  Negative SSA, SSB, scl-70  Negative ANCA by IFA, MPO, PR3     Negative QuantiFERON (4/16/19)    VECTRA DA (9/30/20): 38 (moderate)     CRP and ESR wnl (2/21/19, 6/3/19, 10/10/19, 12/18/19) --> CRP wnl (7/24/20)    Imaging:   I personally reviewed interval imaging and discussed w/ the pt in detail which included:    X-rays (2/21/19):  R hand: Bone mineralization is normal.  No fracture or dislocation. Joint alignment and joint spaces are maintained. No erosions are present. L hand: Bone mineralization is normal.  No fracture or dislocation. Joint alignment and joint spaces are maintained. No erosions are present. R foot: Bone mineralization is normal.  No fracture or dislocation. Joint alignment and joint spaces are maintained. No erosions are present. L foot: Bone mineralization is normal.  No fracture or dislocation. Joint alignment and joint spaces are maintained. No erosions are present. B/l SI joints: No fracture or dislocation. Sacral arcuate lines are intact. Evaluation is somewhat limited by overlying stool and bowel gas, however there is no evidence of ankylosis. Joint spaces appear maintained and no erosions are present. B/l hips appear well maintained and normal in position. MRI hands (3/14/19):  R hand:  No evidence of RA in this nonenhanced exam.  L hand:  Incidental mild tendinosis of the flexor pollicis longus. No evidence of RA in this nonenhanced MRI of the hand. MRI b/l feet w/o contrast (3/4/19): Unremarkable study. Procedures:   EGD w/ Bx(8/2/18): Normal findings of the stomach, duodenal bulb and the duodenum.     Duodenal Bx:  Duodenal mucosa w/o significant histopathologic changes. Stomach Bx:  Mild chronic inactive gastritis, negative for Helicobacter-like organisms. BLE arterial doppler w/ JOANNA (4/1/19): Impressions   R Impression   1.  The right ankle/brachial index is 1.23   2. The right toe/brachial index is . 44   3. Pulse Volume Recordings at the ankle level are normal.   4. Photoplethysmography (PPG) of the digits reveal severly abnomal waveforms on all five digits. L Impression   1. The left ankle/brachial index is 1.19   2. The right toe/brachial index is . 62   3. Pulse Volume Recordings at the ankle level are normal.   4. Photoplethysmography (PPG) of the digits reveals a moderately abnormal waveform on the first digit and severly abnomal waveforms on digits 2 - 5. Conclusions      Summary      Normal ankle/brachial indices bilaterally. Abnormal waveforms of the left digits. Above results were discussed w/ the pt in detail during today's visit. ASSESSMENT/PLAN:   VECTRA DA from 9/30/20 showed moderately active disease. Humira was subsequently added to her MTX as pt wishes to taper off MTX d/t fear of s/e. Discussed perioperative management of Humira and MTX for her recent ENT procedure as well as possible upcoming hip arthroscopy. Ordered safety labs to be checked at the end of the month. Pt is following w/ Ortho for her labral tear, she has been offered hip arthroplasty by  Ortho. She has seen multiple Ortho's for second opinions so far and is scheduled to get another opinion from [facility] next [facility]. Discussed francisco javier-operative management of her immunosuppression as above. Diagnoses and all orders for this visit:     Diagnosis Orders   1. Rheumatoid arthritis involving multiple sites with positive rheumatoid factor (HCC)  Adalimumab (HUMIRA PEN) 40 MG/0.4ML PNKT    INSULIN SYRINGE 1CC/29G (COMFORT ASSIST INSULIN SYRINGE) 29G X 1/2\" 1 ML MISC    methotrexate Sodium (RHEUMATREX) 50 MG/2ML chemo injection    DISCONTINUED: methotrexate Sodium (RHEUMATREX) 50 MG/2ML chemo injection   2. High risk medication use  ALT    AST    CBC Auto Differential    Creatinine, Serum   3.  Encounter for therapeutic drug monitoring  ALT    AST    CBC Auto Differential    Creatinine, Serum   4. Raynaud's phenomenon without gangrene        Orders Placed This Encounter   Procedures    ALT     Standing Status:   Future     Standing Expiration Date:   6/1/2021    AST     Standing Status:   Future     Standing Expiration Date:   6/1/2021    CBC Auto Differential     Standing Status:   Future     Standing Expiration Date:   6/1/2021    Creatinine, Serum     Standing Status:   Future     Standing Expiration Date:   6/1/2021     Outpatient Encounter Medications as of 12/1/2020   Medication Sig Dispense Refill    Adalimumab (HUMIRA PEN) 40 MG/0.4ML PNKT Inject 40 mg into the skin every 14 days 6 each 1    INSULIN SYRINGE 1CC/29G (COMFORT ASSIST INSULIN SYRINGE) 29G X 1/2\" 1 ML MISC 1 each by Does not apply route once a week Use one syringe to inject weekly Methotrexate into skin. 10 each 5    methotrexate Sodium (RHEUMATREX) 50 MG/2ML chemo injection Inject 0.8 mLs into the skin once a week 3.2 mL 0    amitriptyline (ELAVIL) 10 MG tablet       escitalopram (LEXAPRO) 5 MG tablet Take 5 mg by mouth daily      drospirenone-ethinyl estradiol (JOLENE) 3-0.02 MG per tablet Take 1 tablet by mouth daily      folic acid (FOLVITE) 1 MG tablet Take 1 tablet by mouth daily 90 tablet 0    omeprazole (PRILOSEC) 40 MG delayed release capsule Take 1 capsule by mouth every morning (before breakfast) 30 capsule 2    tretinoin (RETIN-A) 0.05 % cream       Handicap Placard MISC by Does not apply route Permanent.  1 each 0    naproxen (NAPROSYN) 500 MG tablet Take 1 tablet by mouth 2 times daily (with meals) 180 tablet 0    [DISCONTINUED] methotrexate Sodium (RHEUMATREX) 50 MG/2ML chemo injection 50 mg       [DISCONTINUED] methotrexate Sodium (RHEUMATREX) 50 MG/2ML chemo injection Inject 1 mL into the skin once a week 4 mL 0    [DISCONTINUED] Adalimumab (HUMIRA PEN) 40 MG/0.4ML PNKT Inject 40 mg into the skin every 14 days 6 each 0    [DISCONTINUED] INSULIN SYRINGE 1CC/29G (COMFORT ASSIST INSULIN SYRINGE) 29G X 1/2\" 1 ML MISC 1 each by Does not apply route once a week Use one syringe to inject weekly Methotrexate into skin. 10 each 2     No facility-administered encounter medications on file as of 12/1/2020. Return in about 6 months (around 6/1/2021) for lab result discussion and treatment plan, medication monitoring. The risks and benefits of my recommendations, as well as other treatment options, benefits and side effects were discussed with the patient today. Questions were answered. NOTE: This report is transcribed by using voice recognition software dragon. Every effort is made to ensure accuracy; however, inadvertent computerized  transcription errors may be present.

## 2021-01-04 DIAGNOSIS — M05.79 RHEUMATOID ARTHRITIS INVOLVING MULTIPLE SITES WITH POSITIVE RHEUMATOID FACTOR (HCC): ICD-10-CM

## 2021-01-04 DIAGNOSIS — Z51.81 ENCOUNTER FOR THERAPEUTIC DRUG MONITORING: ICD-10-CM

## 2021-01-04 DIAGNOSIS — Z79.899 HIGH RISK MEDICATION USE: ICD-10-CM

## 2021-01-04 LAB
ALT SERPL-CCNC: 10 U/L (ref 10–40)
AST SERPL-CCNC: 16 U/L (ref 15–37)
BASOPHILS ABSOLUTE: 0 K/UL (ref 0–0.2)
BASOPHILS RELATIVE PERCENT: 0.5 %
CREAT SERPL-MCNC: 0.7 MG/DL (ref 0.6–1.1)
EOSINOPHILS ABSOLUTE: 0.1 K/UL (ref 0–0.6)
EOSINOPHILS RELATIVE PERCENT: 1.4 %
GFR AFRICAN AMERICAN: >60
GFR NON-AFRICAN AMERICAN: >60
HCT VFR BLD CALC: 39.4 % (ref 36–48)
HEMOGLOBIN: 14 G/DL (ref 12–16)
LYMPHOCYTES ABSOLUTE: 2.6 K/UL (ref 1–5.1)
LYMPHOCYTES RELATIVE PERCENT: 55.1 %
MCH RBC QN AUTO: 33.3 PG (ref 26–34)
MCHC RBC AUTO-ENTMCNC: 35.7 G/DL (ref 31–36)
MCV RBC AUTO: 93.3 FL (ref 80–100)
MONOCYTES ABSOLUTE: 0.3 K/UL (ref 0–1.3)
MONOCYTES RELATIVE PERCENT: 6.6 %
NEUTROPHILS ABSOLUTE: 1.7 K/UL (ref 1.7–7.7)
NEUTROPHILS RELATIVE PERCENT: 36.4 %
PDW BLD-RTO: 12.4 % (ref 12.4–15.4)
PLATELET # BLD: 193 K/UL (ref 135–450)
PMV BLD AUTO: 7.8 FL (ref 5–10.5)
RBC # BLD: 4.22 M/UL (ref 4–5.2)
WBC # BLD: 4.7 K/UL (ref 4–11)

## 2021-01-04 RX ORDER — METHOTREXATE 25 MG/ML
20 INJECTION, SOLUTION INTRA-ARTERIAL; INTRAMUSCULAR; INTRAVENOUS WEEKLY
Qty: 3.2 ML | Refills: 0 | Status: SHIPPED | OUTPATIENT
Start: 2021-01-04 | End: 2021-01-06 | Stop reason: SDUPTHER

## 2021-01-04 NOTE — TELEPHONE ENCOUNTER
Pt called stating that she went ahead and got her labs drawn today at the [facility] location. She is asking for the covering MD to review and refill the methotrexate. She is aware results could come in tomorrow. Ok to wait until tomorrow.

## 2021-01-05 ENCOUNTER — TELEPHONE (OUTPATIENT)
Dept: INTERNAL MEDICINE CLINIC | Age: 26
End: 2021-01-05

## 2021-01-05 DIAGNOSIS — M05.79 RHEUMATOID ARTHRITIS INVOLVING MULTIPLE SITES WITH POSITIVE RHEUMATOID FACTOR (HCC): ICD-10-CM

## 2021-01-05 NOTE — TELEPHONE ENCOUNTER
Patient states she requested a refill of Methotrexate and it was sent to [facility]. Patient states she wanted the refill sent to [facility] on [address].

## 2021-01-06 RX ORDER — METHOTREXATE 25 MG/ML
20 INJECTION, SOLUTION INTRA-ARTERIAL; INTRAMUSCULAR; INTRAVENOUS WEEKLY
Qty: 3.2 ML | Refills: 2 | Status: SHIPPED | OUTPATIENT
Start: 2021-01-06 | End: 2021-02-05

## 2021-01-08 ENCOUNTER — TELEPHONE (OUTPATIENT)
Dept: RHEUMATOLOGY | Age: 26
End: 2021-01-08

## 2021-01-11 ENCOUNTER — TELEPHONE (OUTPATIENT)
Dept: RHEUMATOLOGY | Age: 26
End: 2021-01-11

## 2021-01-11 DIAGNOSIS — M05.79 RHEUMATOID ARTHRITIS INVOLVING MULTIPLE SITES WITH POSITIVE RHEUMATOID FACTOR (HCC): ICD-10-CM

## 2021-01-11 RX ORDER — FOLIC ACID 1 MG/1
1 TABLET ORAL DAILY
Qty: 90 TABLET | Refills: 0 | Status: SHIPPED | OUTPATIENT
Start: 2021-01-11 | End: 2021-03-19

## 2021-01-11 NOTE — TELEPHONE ENCOUNTER
Called and spoke with patient, states she seen an orthopaedic at [facility] on her hip. State she is supposed to have surgery, She was given 2 options on what surgery she needs. She can have a minor sx to repair a tear in her labrium. She also needs a major sx for hip displasia called KRISTIN sx. Dr Jay Diaz wanted patient to reach out to her Rheumatologist. Patient will need to have her RA under control before any surgery. Patient wants to know what you feel about this and if her RA is controlled enough to have sx.

## 2021-01-11 NOTE — TELEPHONE ENCOUNTER
Patient called back and wants to speak with Dr Jessica Olguin the last visit with Dr Paris Jung she didn't think her RA was controlled. States she started on Humira and don't feel any improvement. She is very concerned about getting this surgery.  Please call and explain to patient on cell# [phone]

## 2021-01-11 NOTE — TELEPHONE ENCOUNTER
----- Message from Belle Tena sent at 1/11/2021  1:41 PM EST -----  Subject: Message to Provider    QUESTIONS  Information for Provider? message for Kaylin Lawler pt is following up with   voice msg please have her call pt when she has a chance  ---------------------------------------------------------------------------  --------------  [address]  What is the best way for the office to contact you? OK to leave message on   voicemail  Preferred Call Back Phone Number? [phone]  ---------------------------------------------------------------------------  --------------  SCRIPT ANSWERS  Relationship to Patient?  Self

## 2021-02-04 DIAGNOSIS — M05.79 RHEUMATOID ARTHRITIS INVOLVING MULTIPLE SITES WITH POSITIVE RHEUMATOID FACTOR (HCC): ICD-10-CM

## 2021-02-04 RX ORDER — ADALIMUMAB 40MG/0.4ML
40 KIT SUBCUTANEOUS
Qty: 6 EACH | Refills: 1 | Status: SHIPPED | OUTPATIENT
Start: 2021-02-04 | End: 2021-04-29 | Stop reason: SDUPTHER

## 2021-02-04 NOTE — TELEPHONE ENCOUNTER
Patient called to request refills be sent to [facility] for [facility]. She will be due next wk. Please call patient  when this has been addressed.

## 2021-03-19 DIAGNOSIS — M05.79 RHEUMATOID ARTHRITIS INVOLVING MULTIPLE SITES WITH POSITIVE RHEUMATOID FACTOR (HCC): ICD-10-CM

## 2021-03-19 RX ORDER — FOLIC ACID 1 MG/1
TABLET ORAL
Qty: 90 TABLET | Refills: 0 | Status: SHIPPED | OUTPATIENT
Start: 2021-03-19 | End: 2021-04-29 | Stop reason: SDUPTHER

## 2021-04-26 NOTE — PROGRESS NOTES
Violet Mora MD  [facility]) Physicians - Rheumatology    [] [facility] HOSPITAL:  [address], [address] [facility]  Cameron Gold [x] [place] Office:  [address], [address]   Office: [phone]  Fax: [phone]     Rheumatology Progress Note    SUBJECTIVE:    Background:   Juana Bush is a 26 y.o. CF w/ seropositive RA (RF 38, negative CCP) and Raynaud's phenomenon. PMHx pertinent for IBS (per pt Dx by GI (Dr. Milton Davey) in 2018) and depression. Current rheum meds:  MTX 20 mg SC wkly: started in 4/19, has been on hold for the past month d/t pt getting the COVID-19 vaccine  Humira: started in 11/20  Naproxen 500 mg BID PRN  Lexapro and Elavil: written by PCP    Prior rheum meds:  Naproxen 500 mg BID: improved joint Sx by 30%   mg daily: took from 3/19 to 7/19, caused diarrhea  ASA 81 mg daily: per pt this did improve her feet Sx, she self d/c'ed as her Sx resolved    Past medical/surgical history, medications and allergies are reviewed and updated as appropriate. Interval Hx:   Pt reports no significant improvement in her RA since starting Humira in November. She cont to have arthralgias mainly in her wrists and hands. She reports some joint swelling. Morning stiffness lasts 10 min. She has been off MTX for the past month d/t her receiving the COVID-19 vaccine. She has not noticed any worsening of her joint Sx since stopping MTX. She recently saw [facility] Ortho for her L femoral anteversion and anterosuperior labrum tear. She has been offered surgical correction. Pt c/o worsening fatigue over the past few months. She feels fatigued throughout the day despite getting adequate sleep. She states her fatigue did not improve after coming off MTX over the past month. She is currently working part time in retail.     Rheumatologic ROS:  Constitutional: denies chronic fatigue, fever/chills, night sweats, unintentional weight loss  Integumentary: denies photosensitivity, rash, icterus  CVS: RRR  LUNGS: in no acute respiratory distress, CTAB  MSK:  Upper extremities:              Hands: MCP joints w/o significant swelling slightly TTP, chronic bony enlargement and slightly bogginess of the b/l PIP joints w/ 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 TTP, there is sparing of the b/l DIP joints NTTP, full fist formation w/ good  strength              Wrist: R wrist joint slightly boggy and mildly TTP, FROM              Elbow: no synovitis or bursitis, FROM  Lower extremities:              Knees: no warmth or effusion present, FROM              Ankles: no synovitis, FROM  INTEGUMENTARY: no skin thickening, no malar rash or psoriatic lesions, no petechiae, bruises, or palpable purpura, no patchy alopecia, no nail or periungual changes, no clubbing or digital ulcers    DATA:  Labs:    I personally reviewed interval labs and discussed w/ the pt in detail which showed:    Lab Results   Component Value Date    WBC 4.7 01/04/2021    HGB 14.0 01/04/2021    HCT 39.4 01/04/2021    MCV 93.3 01/04/2021     01/04/2021    LYMPHOPCT 55.1 01/04/2021    RBC 4.22 01/04/2021    MCH 33.3 01/04/2021    MCHC 35.7 01/04/2021    RDW 12.4 01/04/2021     Lab Results   Component Value Date     02/21/2019    K 3.7 02/21/2019     02/21/2019    CO2 23 02/21/2019    BUN 10 02/21/2019    CREATININE 0.7 01/04/2021    GLUCOSE 88 02/21/2019    CALCIUM 9.4 02/21/2019    PROT 7.4 02/21/2019    LABALBU 4.4 02/21/2019    LABALBU 4.0 02/21/2019    BILITOT 0.5 02/21/2019    ALKPHOS 35 (L) 02/21/2019    AST 16 01/04/2021    ALT 10 01/04/2021    LABGLOM >60 01/04/2021    GFRAA >60 01/04/2021    AGRATIO 1.5 02/21/2019    GLOB 3.0 02/21/2019     Interval labs from 2/21/19:  RF 38.0, negative CCP  Negative HLA B27  Negative DORCAS, normal C3 and C4  Negative LA, B2GP-1 IgM and IgG, aCL IgM and IgG  Negative cryoglobulin  Negative SPEP and UPEP  Uric acid 2.5  Negative HIV, hepatitis B and C serologies     Interval labs from 3/14/19:  Negative SSA, SSB, scl-70  Negative ANCA by IFA, MPO, PR3     Negative QuantiFERON (9/30/20)    VECTRA DA (9/30/20): 38 (moderate)     CRP and ESR wnl (2/21/19, 6/3/19, 10/10/19, 12/18/19) --> CRP wnl (7/24/20)    Imaging:   I personally reviewed interval imaging and discussed w/ the pt in detail which included:    X-rays (2/21/19):  R hand: Bone mineralization is normal.  No fracture or dislocation. Joint alignment and joint spaces are maintained. No erosions are present. L hand: Bone mineralization is normal.  No fracture or dislocation. Joint alignment and joint spaces are maintained. No erosions are present. R foot: Bone mineralization is normal.  No fracture or dislocation. Joint alignment and joint spaces are maintained. No erosions are present. L foot: Bone mineralization is normal.  No fracture or dislocation. Joint alignment and joint spaces are maintained. No erosions are present. B/l SI joints: No fracture or dislocation. Sacral arcuate lines are intact. Evaluation is somewhat limited by overlying stool and bowel gas, however there is no evidence of ankylosis. Joint spaces appear maintained and no erosions are present. B/l hips appear well maintained and normal in position. MRI hands (3/14/19):  R hand:  No evidence of RA in this nonenhanced exam.  L hand:  Incidental mild tendinosis of the flexor pollicis longus. No evidence of RA in this nonenhanced MRI of the hand. MRI b/l feet w/o contrast (3/4/19): Unremarkable study. Procedures:   EGD w/ Bx(8/2/18): Normal findings of the stomach, duodenal bulb and the duodenum.     Duodenal Bx:  Duodenal mucosa w/o significant histopathologic changes. Stomach Bx:  Mild chronic inactive gastritis, negative for Helicobacter-like organisms. BLE arterial doppler w/ JOANNA (4/1/19): Impressions   R Impression   1. The right ankle/brachial index is 1.23   2. The right toe/brachial index is . 44   3. Pulse Volume Recordings at the ankle level are normal.   4. Photoplethysmography (PPG) of the digits reveal severly abnomal waveforms on all five digits. L Impression   1. The left ankle/brachial index is 1.19   2. The right toe/brachial index is . 62   3. Pulse Volume Recordings at the ankle level are normal.   4. Photoplethysmography (PPG) of the digits reveals a moderately abnormal waveform on the first digit and severly abnomal waveforms on digits 2 - 5. Conclusions      Summary      Normal ankle/brachial indices bilaterally. Abnormal waveforms of the left digits. Above results were discussed w/ the pt in detail during today's visit. ASSESSMENT/PLAN:   She has mild synovitis on exam today, suspect RA remains active on Humira. She has been off MTX for the past month. Repeat VECTRA DA now. Discussed various biologic DMARD options. Pt agreed to switch to Enbrel. Will resume MTX at lower dose 4 tablets/wk as pt did not notice any worsening of her joint Sx after coming off MTX. Repeat safety labs now. Pt currently has her hip surgery scheduled for end of June. Discussed perioperative management of her immunosuppression. Discussed her fatigue which is an unlikely s/e of MTX as her fatigue as not improved after holding MTX over the past month. Instructed pt to increase her folic acid dose to 2 mg, discussed DHEA, can also consider OTC tumeric and CoQ10. Check TSH level and referred pt for sleep study. Diagnoses and all orders for this visit:     Diagnosis Orders   1. Rheumatoid arthritis involving multiple sites with positive rheumatoid factor (HCC)  Adalimumab (HUMIRA PEN) 40 SZ/7.6ON PNKT    folic acid (FOLVITE) 1 MG tablet    methotrexate (RHEUMATREX) 2.5 MG chemo tablet    Etanercept (ENBREL MINI) 50 MG/ML SOCT   2. High risk medication use  ALT    AST    CBC Auto Differential    Creatinine, Serum   3.  Encounter for therapeutic drug monitoring  ALT    AST    CBC Auto Differential tablet 0    [DISCONTINUED] Adalimumab (HUMIRA PEN) 40 MG/0.4ML PNKT Inject 40 mg into the skin every 14 days 6 each 1    [DISCONTINUED] INSULIN SYRINGE 1CC/29G (COMFORT ASSIST INSULIN SYRINGE) 29G X 1/2\" 1 ML MISC 1 each by Does not apply route once a week Use one syringe to inject weekly Methotrexate into skin. 10 each 5    [DISCONTINUED] naproxen (NAPROSYN) 500 MG tablet Take 1 tablet by mouth 2 times daily (with meals) 180 tablet 0     No facility-administered encounter medications on file as of 4/29/2021. Return in about 2 months (around 6/29/2021) for lab result discussion and treatment plan, medication monitoring. The risks and benefits of my recommendations, as well as other treatment options, benefits and side effects were discussed with the patient today. Questions were answered. NOTE: This report is transcribed by using voice recognition software dragon. Every effort is made to ensure accuracy; however, inadvertent computerized  transcription errors may be present.

## 2021-04-29 ENCOUNTER — TELEPHONE (OUTPATIENT)
Dept: RHEUMATOLOGY | Age: 26
End: 2021-04-29

## 2021-04-29 ENCOUNTER — OFFICE VISIT (OUTPATIENT)
Dept: RHEUMATOLOGY | Age: 26
End: 2021-04-29
Payer: COMMERCIAL

## 2021-04-29 VITALS
SYSTOLIC BLOOD PRESSURE: 136 MMHG | WEIGHT: 118 LBS | BODY MASS INDEX: 20.14 KG/M2 | DIASTOLIC BLOOD PRESSURE: 84 MMHG | HEIGHT: 64 IN

## 2021-04-29 DIAGNOSIS — M05.79 RHEUMATOID ARTHRITIS INVOLVING MULTIPLE SITES WITH POSITIVE RHEUMATOID FACTOR (HCC): Primary | ICD-10-CM

## 2021-04-29 DIAGNOSIS — Z51.81 ENCOUNTER FOR THERAPEUTIC DRUG MONITORING: ICD-10-CM

## 2021-04-29 DIAGNOSIS — R53.83 OTHER FATIGUE: ICD-10-CM

## 2021-04-29 DIAGNOSIS — Z79.899 HIGH RISK MEDICATION USE: ICD-10-CM

## 2021-04-29 DIAGNOSIS — S73.192D TEAR OF LEFT ACETABULAR LABRUM, SUBSEQUENT ENCOUNTER: ICD-10-CM

## 2021-04-29 LAB
ALT SERPL-CCNC: 11 U/L (ref 10–40)
AST SERPL-CCNC: 17 U/L (ref 15–37)
BASOPHILS ABSOLUTE: 0 K/UL (ref 0–0.2)
BASOPHILS RELATIVE PERCENT: 0.4 %
CREAT SERPL-MCNC: 0.7 MG/DL (ref 0.6–1.1)
EOSINOPHILS ABSOLUTE: 0 K/UL (ref 0–0.6)
EOSINOPHILS RELATIVE PERCENT: 0.9 %
GFR AFRICAN AMERICAN: >60
GFR NON-AFRICAN AMERICAN: >60
HCT VFR BLD CALC: 39.2 % (ref 36–48)
HEMOGLOBIN: 13.9 G/DL (ref 12–16)
LYMPHOCYTES ABSOLUTE: 2.2 K/UL (ref 1–5.1)
LYMPHOCYTES RELATIVE PERCENT: 49.9 %
MCH RBC QN AUTO: 33 PG (ref 26–34)
MCHC RBC AUTO-ENTMCNC: 35.5 G/DL (ref 31–36)
MCV RBC AUTO: 92.9 FL (ref 80–100)
MONOCYTES ABSOLUTE: 0.3 K/UL (ref 0–1.3)
MONOCYTES RELATIVE PERCENT: 7.4 %
NEUTROPHILS ABSOLUTE: 1.9 K/UL (ref 1.7–7.7)
NEUTROPHILS RELATIVE PERCENT: 41.4 %
PDW BLD-RTO: 12.3 % (ref 12.4–15.4)
PLATELET # BLD: 164 K/UL (ref 135–450)
PMV BLD AUTO: 7.9 FL (ref 5–10.5)
RBC # BLD: 4.22 M/UL (ref 4–5.2)
TSH SERPL DL<=0.05 MIU/L-ACNC: 1.57 UIU/ML (ref 0.27–4.2)
WBC # BLD: 4.5 K/UL (ref 4–11)

## 2021-04-29 PROCEDURE — 99214 OFFICE O/P EST MOD 30 MIN: CPT | Performed by: INTERNAL MEDICINE

## 2021-04-29 RX ORDER — FOLIC ACID 1 MG/1
2 TABLET ORAL DAILY
Qty: 180 TABLET | Refills: 0 | Status: SHIPPED | OUTPATIENT
Start: 2021-04-29 | End: 2021-06-29 | Stop reason: SDUPTHER

## 2021-04-29 RX ORDER — ETANERCEPT 50 MG/ML
50 SOLUTION SUBCUTANEOUS
Qty: 12 CARTRIDGE | Refills: 0 | Status: SHIPPED | OUTPATIENT
Start: 2021-04-29 | End: 2021-05-07 | Stop reason: SDUPTHER

## 2021-04-29 RX ORDER — ADALIMUMAB 40MG/0.4ML
40 KIT SUBCUTANEOUS
Qty: 6 EACH | Refills: 0 | Status: SHIPPED | OUTPATIENT
Start: 2021-04-29 | End: 2021-06-29

## 2021-05-04 ENCOUNTER — TELEPHONE (OUTPATIENT)
Dept: RHEUMATOLOGY | Age: 26
End: 2021-05-04

## 2021-05-04 NOTE — TELEPHONE ENCOUNTER
Insurance letter (scanned) stating she cannot get Humira in combination w/ Enbrel. I am switching her from Humira to Enbrel. We can provide Humira samples until we get her Enbrel approved.

## 2021-05-05 ENCOUNTER — TELEPHONE (OUTPATIENT)
Dept: ADMINISTRATIVE | Age: 26
End: 2021-05-05

## 2021-05-05 NOTE — TELEPHONE ENCOUNTER
Called patient left message her Enbrel mini was approved and she will hear from [id number] on shipment

## 2021-05-07 DIAGNOSIS — M05.79 RHEUMATOID ARTHRITIS INVOLVING MULTIPLE SITES WITH POSITIVE RHEUMATOID FACTOR (HCC): ICD-10-CM

## 2021-05-07 RX ORDER — ETANERCEPT 50 MG/ML
50 SOLUTION SUBCUTANEOUS
Qty: 12 CARTRIDGE | Refills: 0 | Status: SHIPPED | OUTPATIENT
Start: 2021-05-07 | End: 2021-06-29 | Stop reason: SDUPTHER

## 2021-05-07 NOTE — RESULT ENCOUNTER NOTE
Please let pt know her VECTRA score showed moderately active RA, as I suspected. We'll have her switch from Humira to Enbrel as planned, it looks like we just got Enbrel approved.

## 2021-05-07 NOTE — TELEPHONE ENCOUNTER
Received approval from [facility]. Needs sent to required specialty pharmacy. Pended to appropriate pharmacy.       LOV 4/29/21  NOV 6/29/21  Labs 4/29/21

## 2021-05-07 NOTE — TELEPHONE ENCOUNTER
Patient states she needs prescription for Enbrel Mini sent to [facility]. She states she is going to be out of medication. Patient would like a call back.

## 2021-05-11 ENCOUNTER — TELEPHONE (OUTPATIENT)
Dept: PRIMARY CARE CLINIC | Age: 26
End: 2021-05-11

## 2021-05-11 ENCOUNTER — TELEPHONE (OUTPATIENT)
Dept: RHEUMATOLOGY | Age: 26
End: 2021-05-11

## 2021-05-11 NOTE — TELEPHONE ENCOUNTER
----- Message from [employer] sent at 5/10/2021  4:00 PM EDT -----  Patient called back is aware on her [address] called her and will be shipping in in 24-48hrs. Patient is aware she needs to come into the office and get the cartridge for enbrel. Patient also- your recommendations regarding when to have her surgery or how long to wait. States wanted to know regarding her fatigue-since labs are done-what else she can take or what options to do regarding her fatigue. Please advise.  Cell# [phone]

## 2021-05-11 NOTE — TELEPHONE ENCOUNTER
If her fatigue is related to her RA, I would expect her Sx to improve after we get her arthritis better controlled. Otherwise she should f/u w/ her PCP to get worked up for chronic fatigue.

## 2021-05-11 NOTE — TELEPHONE ENCOUNTER
Called left message for patient that her fatigue is related to her RA. Once her RA is in better controll she will start to feel better.  Or if she continues to have chronic fatigue to call her PCP

## 2021-05-13 ENCOUNTER — TELEPHONE (OUTPATIENT)
Dept: INTERNAL MEDICINE CLINIC | Age: 26
End: 2021-05-13

## 2021-05-13 NOTE — TELEPHONE ENCOUNTER
Patient calling in regards to her cartridge for her enbrel. She's stated she's been trying to get in contact with [facility] since Tuesday, and would just like to know when she is able to  the cartridge.        Please call and advise

## 2021-05-17 ENCOUNTER — TELEPHONE (OUTPATIENT)
Dept: RHEUMATOLOGY | Age: 26
End: 2021-05-17

## 2021-06-28 NOTE — PROGRESS NOTES
Jennifer Gama MD  [facility]) Physicians - Rheumatology    [x] [facility] HOSPITAL:  [address], [address] [facility] [] [place] Office:  [address], [address]   Office: [phone]  Fax: [phone]     Rheumatology Progress Note    SUBJECTIVE:    Background:   Carol Fernandez is a 26 y.o. CF w/ seropositive RA (RF 38, negative CCP) and Raynaud's phenomenon. PMHx pertinent for IBS (per pt Dx by GI (Dr. Boogie Pepe) in 2018) and depression. Current rheum meds:  MTX 5 tablets/wk: started in 4/19  Enbrel: started in 5/2020  Naproxen 500 mg BID PRN  Lexapro and Elavil: written by PCP    Prior rheum meds:  Naproxen 500 mg BID: improved joint Sx by 30%   mg daily: took from 3/19 to 7/19, caused diarrhea  Humira: took from 11/2020-5/2020, ineffective  ASA 81 mg daily: per pt this did improve her feet Sx, she self d/c'ed as her Sx resolved    Past medical/surgical history, medications and allergies are reviewed and updated as appropriate. Interval Hx:   Pt states she switched Humira to Enbrel and took Enbrel for 4 wks until she ran out - she's been having difficulty getting it filled through her specialty pharmacy d/t a billing error. She has been out of Enbrel for the past 3-4 wks. She felt Enbrel was working better than Humira however she reports return of arthralgias and stiffness after she stopped taking Enbrel. She remains on MTX 4 tablets/wk. She reports no change to her chronic fatigue. She has not noticed any improvement in her fatigue after we increased her folic acid dose from 1 mg to 2 mg daily. She tells me her PCP tried to prescribe her vitamin B12 but she's unable to take it d/t allergy to cobalt.     Rheumatologic ROS:  Constitutional: denies chronic fatigue, fever/chills, night sweats, unintentional weight loss  Integumentary: denies photosensitivity, rash, patchy alopecia, or Sx of Raynaud's phenomenon  Eyes: denies dry eyes, redness or pain, visual disturbance, or floaters  Nose: denies nasal ulcers or recurrent sinusitis  Oral cavity: denies dry mouth or oral ulcers  Cardiovascular: denies CP, palpitations, Hx of pericardial effusion or pericarditis  Respiratory: denies SOB, cough, hemoptysis, or pleurisy  Musculoskeletal:  refer to above HPI     Allergies   Allergen Reactions    Cobalt Itching and Swelling     Patient did allergy testing   Causes redness too        Past Medical History:        Diagnosis Date    Polyarthritis with positive rheumatoid factor ([facility] [address] 2/12/2019       Past Surgical History:        Procedure Laterality Date    IN[facility] EAR SURGERY [facility] 2010    [facility]    WISDOM TOOTH EXTRACTION         Medications:    Current Outpatient Medications   Medication Sig Dispense Refill    Etanercept (ENBREL MINI) 50 MG/ML SOCT Inject 50 mg into the skin every 7 days 12 Cartridge 0    folic acid (FOLVITE) 1 MG tablet Take 1 tablet by mouth daily 90 tablet 0    methotrexate (RHEUMATREX) 2.5 MG chemo tablet Take 4 tablets by mouth once a week 48 tablet 0    naproxen (NAPROSYN) 500 MG tablet Take 1 tablet by mouth 2 times daily as needed for Pain 180 tablet 0    predniSONE (DELTASONE) 10 MG tablet Take 2 tablets by mouth daily for 10 days, THEN 1 tablet daily for 10 days, THEN 0.5 tablets daily for 10 days. 35 tablet 2    amitriptyline (ELAVIL) 10 MG tablet       escitalopram (LEXAPRO) 5 MG tablet Take 5 mg by mouth daily      drospirenone-ethinyl estradiol (JOLENE) 3-0.02 MG per tablet Take 1 tablet by mouth daily      omeprazole (PRILOSEC) 40 MG delayed release capsule Take 1 capsule by mouth every morning (before breakfast) 30 capsule 2    tretinoin (RETIN-A) 0.05 % cream       Handicap Placard MISC by Does not apply route Permanent. 1 each 0     No current facility-administered medications for this visit.         OBJECTIVE:  Physical Exam:    /86   Ht 5' 4\" (1.626 m)   Wt 115 lb (52.2 kg)   BMI 19.74 kg/m²     GEN: AAOx3, in SSB, scl-70 (3/14/19)  Negative LA, B2GP-1 IgM and IgG, aCL IgM and IgG (2/21/19)  Negative ANCA by IFA, MPO, PR3 (3/14/19)  Negative cryoglobulin (2/21/19)  Negative SPEP and UPEP (2/21/19)  Negative HIV, hepatitis B and C serologies (2/21/19)  Negative QuantiFERON (9/30/20)  VECTRA DA (9/30/20): 38 (moderate)  VECTRA DA (5/1/21): 32 (moderate)    Imaging:   I personally reviewed interval imaging and discussed w/ the pt in detail which included:    X-rays (2/21/19):  R hand: Bone mineralization is normal.  No fracture or dislocation. Joint alignment and joint spaces are maintained. No erosions are present. L hand: Bone mineralization is normal.  No fracture or dislocation. Joint alignment and joint spaces are maintained. No erosions are present. R foot: Bone mineralization is normal.  No fracture or dislocation. Joint alignment and joint spaces are maintained. No erosions are present. L foot: Bone mineralization is normal.  No fracture or dislocation. Joint alignment and joint spaces are maintained. No erosions are present. B/l SI joints: No fracture or dislocation. Sacral arcuate lines are intact. Evaluation is somewhat limited by overlying stool and bowel gas, however there is no evidence of ankylosis. Joint spaces appear maintained and no erosions are present. B/l hips appear well maintained and normal in position. MRI hands (3/14/19):  R hand:  No evidence of RA in this nonenhanced exam.  L hand:  Incidental mild tendinosis of the flexor pollicis longus. No evidence of RA in this nonenhanced MRI of the hand. MRI b/l feet w/o contrast (3/4/19): Unremarkable study. Procedures:   EGD w/ Bx(8/2/18): Normal findings of the stomach, duodenal bulb and the duodenum.     Duodenal Bx:  Duodenal mucosa w/o significant histopathologic changes. Stomach Bx:  Mild chronic inactive gastritis, negative for Helicobacter-like organisms.     BLE arterial doppler w/ JOANNA (4/1/19): Impressions   R Impression   1. The right ankle/brachial index is 1.23   2. The right toe/brachial index is . 44   3. Pulse Volume Recordings at the ankle level are normal.   4. Photoplethysmography (PPG) of the digits reveal severly abnomal waveforms on all five digits. L Impression   1. The left ankle/brachial index is 1.19   2. The right toe/brachial index is . 62   3. Pulse Volume Recordings at the ankle level are normal.   4. Photoplethysmography (PPG) of the digits reveals a moderately abnormal waveform on the first digit and severly abnomal waveforms on digits 2 - 5. Conclusions      Summary      Normal ankle/brachial indices bilaterally. Abnormal waveforms of the left digits. Above results were discussed w/ the pt in detail during today's visit. ASSESSMENT/PLAN:   1. Seropositive rheumatoid arthritis ([address]  - previously active on Humira and MTX 20 mg SC. Recently switched Humira to Enbrel however she has been off for the past 3-4 wks d/t billing error, pt declined Enbrel samples stating she should be receiving her Enbrel by the end of the wk. MTX dose was reduced from 20 mg SC to 10 mg PO as pt felt the higher dose did not improve her joint Sx and she has been having fatigue. She will cont MTX 4 tablets/wk, decrease folic acid dose from 2 mg to 1 mg daily and skip on the day of her MTX. She will start a Prednisone taper now for her active RA  - Etanercept (ENBREL MINI) 50 MG/ML SOCT; Inject 50 mg into the skin every 7 days  Dispense: 12 Cartridge; Refill: 0  - naproxen (NAPROSYN) 500 MG tablet; Take 1 tablet by mouth 2 times daily as needed for Pain  Dispense: 180 tablet; Refill: 0  - predniSONE (DELTASONE) 10 MG tablet; Take 2 tablets by mouth daily for 10 days, THEN 1 tablet daily for 10 days, THEN 0.5 tablets daily for 10 days. Dispense: 35 tablet; Refill: 2    2. Other fatigue  - could be d/t active RA, she will try OTC Dextromethorphan.  If fatigue persists consider switching

## 2021-06-29 ENCOUNTER — OFFICE VISIT (OUTPATIENT)
Dept: RHEUMATOLOGY | Age: 26
End: 2021-06-29
Payer: COMMERCIAL

## 2021-06-29 VITALS
DIASTOLIC BLOOD PRESSURE: 86 MMHG | BODY MASS INDEX: 19.63 KG/M2 | HEIGHT: 64 IN | WEIGHT: 115 LBS | SYSTOLIC BLOOD PRESSURE: 126 MMHG

## 2021-06-29 DIAGNOSIS — R53.83 OTHER FATIGUE: ICD-10-CM

## 2021-06-29 DIAGNOSIS — M05.9 SEROPOSITIVE RHEUMATOID ARTHRITIS (HCC): Primary | ICD-10-CM

## 2021-06-29 DIAGNOSIS — Z51.81 ENCOUNTER FOR THERAPEUTIC DRUG MONITORING: ICD-10-CM

## 2021-06-29 DIAGNOSIS — Z79.899 HIGH RISK MEDICATION USE: ICD-10-CM

## 2021-06-29 PROCEDURE — 99214 OFFICE O/P EST MOD 30 MIN: CPT | Performed by: INTERNAL MEDICINE

## 2021-06-29 RX ORDER — NAPROXEN 500 MG/1
500 TABLET ORAL 2 TIMES DAILY PRN
Qty: 180 TABLET | Refills: 0 | Status: SHIPPED | OUTPATIENT
Start: 2021-06-29 | End: 2021-09-28 | Stop reason: SDUPTHER

## 2021-06-29 RX ORDER — PREDNISONE 10 MG/1
TABLET ORAL
Qty: 35 TABLET | Refills: 2 | Status: SHIPPED | OUTPATIENT
Start: 2021-06-29 | End: 2021-07-29

## 2021-06-29 RX ORDER — FOLIC ACID 1 MG/1
1 TABLET ORAL DAILY
Qty: 90 TABLET | Refills: 0 | Status: SHIPPED | OUTPATIENT
Start: 2021-06-29 | End: 2021-09-28

## 2021-06-29 RX ORDER — ETANERCEPT 50 MG/ML
50 SOLUTION SUBCUTANEOUS
Qty: 12 CARTRIDGE | Refills: 0 | Status: SHIPPED | OUTPATIENT
Start: 2021-06-29 | End: 2021-09-28 | Stop reason: SDUPTHER

## 2021-06-29 NOTE — PATIENT INSTRUCTIONS
Get labs the last week of July. Decrease folic acid from 2 mg to 1 mg daily, skip folic acid on the day of your Methotrexate. I want you to try over the counter Dextromethorphan (either Mucinex or Robitussin, just make sure you get one that contains DM or Dextromethorphan) 20-50 mg take 1 hour prior to and 8-12 hours after your Methotrexate dose. This has been proven to block the metabolite of Methotrexate that can cause a lot of the side effects including nausea, fatigue and brain fog. I want you to try this for the next few doses and let us know if your symptoms improve. If not then we will have to switch out your Methotrexate.

## 2021-08-12 NOTE — TELEPHONE ENCOUNTER
Spoke to patient and let her know that she will need to get the images from  [id number] and have them sent. She stated she wanted us to get them. Advised I would try but that they may not release them  to us since we didn't order the MRI. Patient did not have number to call. I called the number on her records from [id number] and was on hold for 12 minutes and had to hang up. Will try again. leukocytosis, tachycardia  UA negative  COVID negative  check CXR given recent seizure  US abdomen given RUQ/RLQ TTP  afebrile, monitor off abx  trend leukocytosis/lactate

## 2021-08-16 ENCOUNTER — TELEPHONE (OUTPATIENT)
Dept: RHEUMATOLOGY | Age: 26
End: 2021-08-16

## 2021-08-17 NOTE — TELEPHONE ENCOUNTER
Called patient she is aware she is qualified to get the COVID vaccine. She will need to stop the MTX 1 week after the booster vaccine.

## 2021-08-17 NOTE — TELEPHONE ENCOUNTER
Yes she should be qualified to get the booster COVID-19 vaccine as she is on a biologic. No need to hold Enbrel, she should hold MTX 1 wk after the booster vaccine.

## 2021-08-24 ENCOUNTER — TELEPHONE (OUTPATIENT)
Dept: INTERNAL MEDICINE CLINIC | Age: 26
End: 2021-08-24

## 2021-08-24 NOTE — TELEPHONE ENCOUNTER
Romana Chavez with [employer] is calling for prior authorization for patient's Enbrel Mini cartridges. She states this can be done verbally.   Case ID: [id number]

## 2021-08-26 NOTE — TELEPHONE ENCOUNTER
[place] Approval on Enbrel Mini through [facility] on 8/24/21  Patient has been notified from [address]

## 2021-09-13 DIAGNOSIS — M05.9 SEROPOSITIVE RHEUMATOID ARTHRITIS (HCC): ICD-10-CM

## 2021-09-13 NOTE — TELEPHONE ENCOUNTER
Last seen 6/29/21  Patient has appt 9/28/21  Last labs 8/31/21    Patient was given enough to last till next appt

## 2021-09-14 RX ORDER — ETANERCEPT 50 MG/ML
SOLUTION SUBCUTANEOUS
Qty: 12 ML | Refills: 0 | OUTPATIENT
Start: 2021-09-14

## 2021-09-28 ENCOUNTER — OFFICE VISIT (OUTPATIENT)
Dept: RHEUMATOLOGY | Age: 26
End: 2021-09-28
Payer: COMMERCIAL

## 2021-09-28 VITALS
SYSTOLIC BLOOD PRESSURE: 120 MMHG | TEMPERATURE: 97.6 F | OXYGEN SATURATION: 98 % | DIASTOLIC BLOOD PRESSURE: 80 MMHG | BODY MASS INDEX: 20.77 KG/M2 | WEIGHT: 121 LBS | HEART RATE: 97 BPM

## 2021-09-28 DIAGNOSIS — S73.192D TEAR OF LEFT ACETABULAR LABRUM, SUBSEQUENT ENCOUNTER: ICD-10-CM

## 2021-09-28 DIAGNOSIS — M05.9 SEROPOSITIVE RHEUMATOID ARTHRITIS (HCC): Primary | ICD-10-CM

## 2021-09-28 DIAGNOSIS — K21.9 GASTROESOPHAGEAL REFLUX DISEASE WITHOUT ESOPHAGITIS: ICD-10-CM

## 2021-09-28 DIAGNOSIS — Z51.81 ENCOUNTER FOR THERAPEUTIC DRUG MONITORING: ICD-10-CM

## 2021-09-28 DIAGNOSIS — Z79.899 HIGH RISK MEDICATION USE: ICD-10-CM

## 2021-09-28 PROCEDURE — 99214 OFFICE O/P EST MOD 30 MIN: CPT | Performed by: INTERNAL MEDICINE

## 2021-09-28 RX ORDER — OMEPRAZOLE 40 MG/1
40 CAPSULE, DELAYED RELEASE ORAL
Qty: 90 CAPSULE | Refills: 0 | Status: SHIPPED | OUTPATIENT
Start: 2021-09-28 | End: 2021-11-30 | Stop reason: SDUPTHER

## 2021-09-28 RX ORDER — LEFLUNOMIDE 10 MG/1
10 TABLET ORAL DAILY
Qty: 90 TABLET | Refills: 0 | Status: SHIPPED | OUTPATIENT
Start: 2021-09-28 | End: 2021-11-30 | Stop reason: SINTOL

## 2021-09-28 RX ORDER — ETANERCEPT 50 MG/ML
50 SOLUTION SUBCUTANEOUS
Qty: 12 EACH | Refills: 0 | Status: SHIPPED | OUTPATIENT
Start: 2021-09-28 | End: 2021-11-30

## 2021-09-28 RX ORDER — NAPROXEN 500 MG/1
500 TABLET ORAL 2 TIMES DAILY PRN
Qty: 180 TABLET | Refills: 0 | Status: SHIPPED | OUTPATIENT
Start: 2021-09-28 | End: 2021-11-30 | Stop reason: SDUPTHER

## 2021-09-28 RX ORDER — FOLIC ACID 1 MG/1
1 TABLET ORAL DAILY
Qty: 90 TABLET | Refills: 0 | Status: CANCELLED | OUTPATIENT
Start: 2021-09-28 | End: 2021-12-27

## 2021-09-28 RX ORDER — PREDNISONE 10 MG/1
TABLET ORAL
Qty: 35 TABLET | Refills: 1 | Status: SHIPPED | OUTPATIENT
Start: 2021-09-28 | End: 2021-10-28

## 2021-09-28 NOTE — PROGRESS NOTES
Slick Rodriguez MD  [facility]) Physicians - Rheumatology    [x] [facility] HOSPITAL:  [address], [address]  [place] [] [facility] Office:  [address], [address]   Office: [phone]  Fax: [phone]     Rheumatology Progress Note    SUBJECTIVE:    Background:   Sonam Norwood is a 23 y.o. CF w/ seropositive RA (RF 38, negative CCP) and Raynaud's phenomenon. PMHx pertinent for IBS (per pt Dx by GI (Dr. Danish Sabillon) in 2018) and depression. Current rheum meds:  MTX 4 tablets/wk: started in 4/19  Enbrel: started in 5/2020  Naproxen 500 mg BID PRN  Lexapro and Elavil: written by PCP    Prior rheum meds:  Naproxen 500 mg BID: improved joint Sx by 30%   mg daily: took from 3/19 to 7/19, caused diarrhea  Humira: took from 11/2020-5/2020, ineffective  ASA 81 mg daily: per pt this did improve her feet Sx, she self d/c'ed as her Sx resolved    Past medical/surgical history, medications and allergies are reviewed and updated as appropriate. Interval Hx:   Pt states her RA improved after she started taking Enbrel on a consistent basis however she reports recent disease flares after holding her MTX and Enbrel for recent bronchitis and a GI illness. She reports some arthralgias in her hands. Overall she feels Enbrel is more effective than Humira. She reports less fatigue on the lower dose of MTX. She has not had to take any Prednisone tapers recently    She reports chronic pain in her L hip. She is frustrated at her surgeon at [facility] because her surgery keeps getting pushed back.     Rheumatologic ROS:  Constitutional: denies chronic fatigue, fever/chills, night sweats, unintentional weight loss  Integumentary: denies photosensitivity, rash, patchy alopecia, or Sx of Raynaud's phenomenon  Eyes: denies dry eyes, redness or pain, visual disturbance, or floaters  Nose: denies nasal ulcers or recurrent sinusitis  Oral cavity: denies dry mouth or oral ulcers  Cardiovascular: denies CP, palpitations, Hx of pericardial effusion or pericarditis  Respiratory: denies SOB, cough, hemoptysis, or pleurisy  Musculoskeletal:  refer to above HPI     Allergies   Allergen Reactions    Cobalt Itching and Swelling     Patient did allergy testing   Causes redness too        Past Medical History:        Diagnosis Date    Polyarthritis with positive rheumatoid factor ([address] 2/12/2019       Past Surgical History:        Procedure Laterality Date    IN[facility] EAR SURGERY [facility] 2010    [facility]    WISDOM TOOTH EXTRACTION         Medications:    Current Outpatient Medications   Medication Sig Dispense Refill    Etanercept (ENBREL MINI) 50 MG/ML SOCT Inject 50 mg into the skin every 7 days 12 each 0    naproxen (NAPROSYN) 500 MG tablet Take 1 tablet by mouth 2 times daily as needed for Pain 180 tablet 0    leflunomide (ARAVA) 10 MG tablet Take 1 tablet by mouth daily 90 tablet 0    predniSONE (DELTASONE) 10 MG tablet Take 2 tablets by mouth daily for 10 days, THEN 1 tablet daily for 10 days, THEN 0.5 tablets daily for 10 days. 35 tablet 1    omeprazole (PRILOSEC) 40 MG delayed release capsule Take 1 capsule by mouth every morning (before breakfast) 90 capsule 0    amitriptyline (ELAVIL) 10 MG tablet       escitalopram (LEXAPRO) 5 MG tablet Take 5 mg by mouth daily      drospirenone-ethinyl estradiol (JOLENE) 3-0.02 MG per tablet Take 1 tablet by mouth daily      tretinoin (RETIN-A) 0.05 % cream       Handicap Placard MISC by Does not apply route Permanent. 1 each 0     No current facility-administered medications for this visit.         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, there is sparing of the b/l DIP joints NTTP, full fist formation w/ good  strength              Wrist: prior synovitis in b/l wrist joints mostly resolved NTTP, FROM              Elbow: no synovitis or bursitis, FROM  Lower extremities:              Knees: no warmth or effusion present, FROM              Ankles: no synovitis, FROM  INTEGUMENT: no skin thickening, no malar rash or psoriatic lesions, no petechiae, bruises, or palpable purpura, no patchy alopecia, no nail or periungual changes, no clubbing or digital ulcers    DATA:  Labs:    I personally reviewed interval labs and discussed w/ the pt in detail which showed:    Lab Results   Component Value Date    WBC 4.6 08/31/2021    HGB 13.3 08/31/2021    HCT 37.3 08/31/2021    MCV 90.7 08/31/2021     08/31/2021    LYMPHOPCT 53.0 08/31/2021    RBC 4.11 08/31/2021    MCH 32.3 08/31/2021    MCHC 35.6 08/31/2021    RDW 12.5 08/31/2021       Chemistry        Component Value Date/Time     02/21/2019 1150    K 3.7 02/21/2019 1150     02/21/2019 1150    CO2 23 02/21/2019 1150    BUN 10 02/21/2019 1150    CREATININE 0.6 08/31/2021 1343        Component Value Date/Time    CALCIUM 9.4 02/21/2019 1150    ALKPHOS 35 (L) 02/21/2019 1150    AST 15 08/31/2021 1343    ALT 11 08/31/2021 1343    BILITOT 0.5 02/21/2019 1150        No results found for: VITD25     Lab Results   Component Value Date    C3 118.4 02/21/2019     Lab Results   Component Value Date    C4 12.8 02/21/2019     No results found for: ANTIDSDNAIGG     Lab Results   Component Value Date    CRP 4.4 07/24/2020    CRP 2.9 12/18/2019    CRP 1.3 02/08/2019     Lab Results   Component Value Date    SEDRATE 7 12/18/2019    SEDRATE 6 02/08/2019     Lab Results   Component Value Date LABURIC 2.5 (L) 02/08/2019     RF 38.0, negative CCP (2/21/19)  Negative HLA B27 (2/21/19)  Negative DORCAS (2/21/19)  Negative SSA, SSB, scl-70 (3/14/19)  Negative LA, B2GP-1 IgM and IgG, aCL IgM and IgG (2/21/19)  Negative ANCA by IFA, MPO, PR3 (3/14/19)  Negative cryoglobulin (2/21/19)  Negative SPEP and UPEP (2/21/19)  Negative HIV, hepatitis B and C serologies (2/21/19)  Negative QuantiFERON (9/30/20)  VECTRA DA (9/30/20): 38 (moderate)  VECTRA DA (5/1/21): 32 (moderate)    Imaging:   I personally reviewed interval imaging and discussed w/ the pt in detail which included:    X-rays (2/21/19):  R hand: Bone mineralization is normal.  No fracture or dislocation. Joint alignment and joint spaces are maintained. No erosions are present. L hand: Bone mineralization is normal.  No fracture or dislocation. Joint alignment and joint spaces are maintained. No erosions are present. R foot: Bone mineralization is normal.  No fracture or dislocation. Joint alignment and joint spaces are maintained. No erosions are present. L foot: Bone mineralization is normal.  No fracture or dislocation. Joint alignment and joint spaces are maintained. No erosions are present. B/l SI joints: No fracture or dislocation. Sacral arcuate lines are intact. Evaluation is somewhat limited by overlying stool and bowel gas, however there is no evidence of ankylosis. Joint spaces appear maintained and no erosions are present. B/l hips appear well maintained and normal in position. MRI hands (3/14/19):  R hand:  No evidence of RA in this nonenhanced exam.  L hand:  Incidental mild tendinosis of the flexor pollicis longus. No evidence of RA in this nonenhanced MRI of the hand. MRI b/l feet w/o contrast (3/4/19): Unremarkable study. Procedures:   EGD w/ Bx(8/2/18):    Normal findings of the stomach, duodenal bulb and the duodenum.     Duodenal Bx:  Duodenal mucosa w/o significant histopathologic

## 2021-09-28 NOTE — PATIENT INSTRUCTIONS
a condom to prevent pregnancy while you are taking leflunomide. After your treatment ends, continue using condoms until you have received the medications to help your body eliminate leflunomide. It is not known whether leflunomide passes into breast milk or if it could harm a nursing baby. You should not breast-feed while using this medicine. How should I take leflunomide? Before you start treatment with leflunomide, your doctor may perform tests to make sure you do not have tuberculosis or other infections. Follow all directions on your prescription label. Your doctor may occasionally change your dose to make sure you get the best results. Do not use this medicine in larger or smaller amounts or for longer than recommended. Your blood pressure will need to be checked often. Leflunomide can lower blood cells that help your body fight infections and help your blood to clot. Your blood will need to be tested often. Your leflunomide treatment may be stopped for a short time based on the results of these tests. After you stop taking leflunomide, you may need to be treated with other medicines to help your body eliminate leflunomide quickly. If you do not undergo this drug elimination procedure, leflunomide could stay in your body for up to 2 years. Follow your doctor's instructions. You will also need to go through this drug elimination procedure if you plan to become pregnant after you stop taking leflunomide. Arthritis is often treated with a combination of drugs. Use all medications as directed by your doctor. Read the medication guide or patient instructions provided with each medication. Do not change your doses or medication schedule without your doctor's advice. Store at room temperature away from moisture, heat, and light. What happens if I miss a dose? Take the missed dose as soon as you remember. Skip the missed dose if it is almost time for your next scheduled dose.  Do not take extra medicine to make up the missed dose. What happens if I overdose? Seek emergency medical attention or call the Poison Help line at 1-828.192.5314. Overdose symptoms may include diarrhea, stomach pain, easy bruising or bleeding, dark urine, or jaundice (yellowing of the skin or eyes). What should I avoid while taking leflunomide? Avoid being near people who have colds, the flu, or other contagious illnesses. Contact your doctor at once if you develop signs of infection. Do not receive a \"live\" vaccine while using leflunomide. The vaccine may not work as well during this time, and may not fully protect you from disease. Live vaccines include measles, mumps, rubella (MMR), polio, rotavirus, typhoid, yellow fever, varicella (chickenpox), zoster (shingles), and nasal flu (influenza) vaccine. What are the possible side effects of leflunomide? Get emergency medical help if you have signs of an allergic reaction: hives; difficulty breathing; swelling of your face, lips, tongue, or throat. Call your doctor at once if you have:   fever, swollen glands, body aches, flu symptoms;   pale skin, easy bruising or bleeding;   numbness, tingling, or burning pain in your hands or feet;   upper stomach pain, loss of appetite, dark urine, jaundice (yellowing of the skin or eyes);   new or worsening cough with fever, trouble breathing; or   severe skin reaction --fever, sore throat, swelling in your face or tongue, burning in your eyes, skin pain, followed by a red or purple skin rash that spreads (especially in the face or upper body) and causes blistering and peeling. Common side effects may include:   diarrhea;   nausea, upset stomach;   abnormal liver function tests;   headache;   cold symptoms such as stuffy nose, sneezing, sore throat;   rash; or   thinning hair. This is not a complete list of side effects and others may occur. Call your doctor for medical advice about side effects.  You may report side effects to FDA at 3-503-FDA-5773. What other drugs will affect leflunomide? Many drugs can interact with leflunomide. Not all possible interactions are listed here. Tell your doctor about all your current medicines and any you start or stop using, especially:   cholestyramine;   aspirin, or acetaminophen (Tylenol);   rifampin, or other tuberculosis medicine;   an antibiotic, antifungal medicine, or sulfa drug;   antiviral or HIV/AIDS medication;   birth control pills or hormone replacement therapy;   gout or arthritis medication (including gold injections);   heart or blood pressure medication;   medicine to treat mental illness;   anabolic steroids --methyltestosterone, \"performance-enhancing drugs\"; cancer medication; or   cholesterol-lowering medication --Crestor, Lipitor, Vytorin, Zocor, and others;   an NSAID (nonsteroidal anti-inflammatory drug) --ibuprofen (Advil, Motrin), naproxen (Aleve), celecoxib, diclofenac, indomethacin, meloxicam, and others; or   seizure medication --carbamazepine, phenytoin, valproic acid, and others. This list is not complete and many other drugs can interact with leflunomide. This includes prescription and over-the-counter medicines, vitamins, and herbal products. Give a list of all your medicines to any healthcare provider who treats you. Where can I get more information? Your pharmacist can provide more information about leflunomide. Remember, keep this and all other medicines out of the reach of children, never share your medicines with others, and use this medication only for the indication prescribed. Every effort has been made to ensure that the information provided by [address] is accurate, up-to-date, and complete, but no guarantee is made to that effect. Drug information contained herein may be time sensitive.  [facility] information has been compiled for use by healthcare practitioners and consumers in the [place] and therefore [facility] does not

## 2021-10-01 ENCOUNTER — TELEPHONE (OUTPATIENT)
Dept: PRIMARY CARE CLINIC | Age: 26
End: 2021-10-01

## 2021-10-01 NOTE — TELEPHONE ENCOUNTER
----- Message from Jerome Lane sent at 9/30/2021  6:35 PM EDT -----  Subject: Message to Provider    QUESTIONS  Information for Provider? for dr Tuan Yañez with [employer]   was calling to get pre authorization refill request for enbrel mini 50mg   carthage [id number] case id [id number] he said they need the authorization   before thet can ill request you may call the number above to do so  ---------------------------------------------------------------------------  --------------  [address]  What is the best way for the office to contact you? OK to leave message on   voicemail  Preferred Call Back Phone Number? [phone]  ---------------------------------------------------------------------------  --------------  SCRIPT ANSWERS  Relationship to Patient?  Third Party  Representative Name? Sander Walton with [employer]

## 2021-10-15 ENCOUNTER — TELEPHONE (OUTPATIENT)
Dept: RHEUMATOLOGY | Age: 26
End: 2021-10-15

## 2021-10-15 DIAGNOSIS — M05.9 SEROPOSITIVE RHEUMATOID ARTHRITIS (HCC): Primary | ICD-10-CM

## 2021-10-15 NOTE — TELEPHONE ENCOUNTER
Patient called saying pharmacy called and cancelled her Enbrel she doesn't have her next dose please give her a call.

## 2021-10-18 NOTE — TELEPHONE ENCOUNTER
Called spoke with patient-states her insurance company Denied her Refill on her Enbrel Mini-So patient missed her dose this past Saturday 10/16/21. I called [phone] spoke with Andrea Ramirez was very helpful. [facility] had written the RX for 12 doses and 0R/F. It needs to be written for 4 doses and 2 R/F so insurance will cover the medication. Jessica the Pharmacy tech had changed the script-I gave verbal consent it was ok. [facility] will get the RX sent back into Insurance and sent out to Patient. I gave patient 2 samples to  at the [facility] office. Dr Finley Mu she missed her dose on this past Saturday. When should she take her next dose.

## 2021-10-28 DIAGNOSIS — Z79.899 HIGH RISK MEDICATION USE: ICD-10-CM

## 2021-10-28 DIAGNOSIS — Z51.81 ENCOUNTER FOR THERAPEUTIC DRUG MONITORING: ICD-10-CM

## 2021-10-28 LAB
ALT SERPL-CCNC: 12 U/L (ref 10–40)
AST SERPL-CCNC: 15 U/L (ref 15–37)
BASOPHILS ABSOLUTE: 0 K/UL (ref 0–0.2)
BASOPHILS RELATIVE PERCENT: 0.7 %
CREAT SERPL-MCNC: 0.7 MG/DL (ref 0.6–1.1)
EOSINOPHILS ABSOLUTE: 0.1 K/UL (ref 0–0.6)
EOSINOPHILS RELATIVE PERCENT: 1.6 %
GFR AFRICAN AMERICAN: >60
GFR NON-AFRICAN AMERICAN: >60
HCT VFR BLD CALC: 42.5 % (ref 36–48)
HEMOGLOBIN: 14.7 G/DL (ref 12–16)
LYMPHOCYTES ABSOLUTE: 2.7 K/UL (ref 1–5.1)
LYMPHOCYTES RELATIVE PERCENT: 43.7 %
MCH RBC QN AUTO: 31.8 PG (ref 26–34)
MCHC RBC AUTO-ENTMCNC: 34.6 G/DL (ref 31–36)
MCV RBC AUTO: 91.8 FL (ref 80–100)
MONOCYTES ABSOLUTE: 0.5 K/UL (ref 0–1.3)
MONOCYTES RELATIVE PERCENT: 8.5 %
NEUTROPHILS ABSOLUTE: 2.8 K/UL (ref 1.7–7.7)
NEUTROPHILS RELATIVE PERCENT: 45.5 %
PDW BLD-RTO: 11.9 % (ref 12.4–15.4)
PLATELET # BLD: 192 K/UL (ref 135–450)
PMV BLD AUTO: 8.8 FL (ref 5–10.5)
RBC # BLD: 4.63 M/UL (ref 4–5.2)
WBC # BLD: 6.2 K/UL (ref 4–11)

## 2021-11-27 NOTE — PROGRESS NOTES
Fredis Muhammad MD  [facility] - Rheumatology    [x] [facility] HOSPITAL:  [address] [facility] [] [place] Office:  [address], [address]   Office: [phone]  Fax: [phone]     Rheumatology Progress Note    SUBJECTIVE:    Background:   Mo Galvin is a 23 y.o. CF w/ seropositive RA (RF 38, negative CCP) and Raynaud's phenomenon. PMHx pertinent for IBS (per pt Dx by GI (Dr. Jahaira Maurer) in 2018) and depression. Current rheum meds:  Leflunomide 10 mg daily: started in 9/2021  Enbrel: started in 5/2020  Naproxen 500 mg BID PRN  Lexapro and Elavil: written by PCP    Prior rheum meds:  Naproxen 500 mg BID: improved joint Sx by 30%   mg daily: took from 3/19 to 7/19, caused diarrhea  MTX 4 tablets/wk: took from 4/2019-9/2021, switched to Leflunomide d/t fatigue  Humira: took from 11/2020-5/2020, ineffective  ASA 81 mg daily: per pt this did improve her feet Sx, she self d/c'ed as her Sx resolved    Past medical/surgical history, medications and allergies are reviewed and updated as appropriate. Interval Hx:   Pt states she switched MTX to Leflunomide approximately 2 months ago. She has been taking Leflunomide 10 mg daily. She feels Leflunomide works better than MTX however she reports significant hair loss. She reports chronic arthralgias in her hands and wrists. Morning stiffness lasts 15 minutes. She remains on Enbrel. She takes Naproxen on PRN basis. She tries to avoid Prednisone d/t s/e. She reports paresthesias in all of her fingers. She tells me she rescheduled her L hip surgery/f/u visit to January as she will be starting a new job at the [employer] in the near future.     Rheumatologic ROS:  Constitutional: denies chronic fatigue, fever/chills, night sweats, unintentional weight loss  Integumentary: denies photosensitivity, rash, patchy alopecia, or Sx of Raynaud's phenomenon  Eyes: denies dry eyes, redness or pain, visual disturbance, or floaters  Nose: denies nasal ulcers or recurrent sinusitis  Oral cavity: denies dry mouth or oral ulcers  Cardiovascular: denies CP, palpitations, Hx of pericardial effusion or pericarditis  Respiratory: denies SOB, cough, hemoptysis, or pleurisy  Musculoskeletal:  refer to above HPI     Allergies   Allergen Reactions    Cobalt Itching and Swelling     Patient did allergy testing   Causes redness too        Past Medical History:        Diagnosis Date    Polyarthritis with positive rheumatoid factor ([address] 2/12/2019       Past Surgical History:        Procedure Laterality Date    IN[facility] EAR SURGERY Left 2010    [facility]    WISDOM TOOTH EXTRACTION         Medications:    Current Outpatient Medications   Medication Sig Dispense Refill    Etanercept (ENBREL MINI) 50 MG/ML SOCT Inject 50 mg into the skin every 7 days 12 each 0    naproxen (NAPROSYN) 500 MG tablet Take 1 tablet by mouth 2 times daily as needed for Pain 180 tablet 0    omeprazole (PRILOSEC) 40 MG delayed release capsule Take 1 capsule by mouth every morning (before breakfast) 90 capsule 0    predniSONE (DELTASONE) 10 MG tablet Take 2 tablets by mouth daily for 10 days, THEN 1 tablet daily for 10 days, THEN 0.5 tablets daily for 10 days. 35 tablet 2    sulfaSALAzine (AZULFIDINE) 500 MG tablet WK 1: 1 pill with dinner   WK 2: 1 pill with meal twice daily   WK 3: 1 pill with breakfast and 2 pills with dinner  WK 4: 2 pills with meal twice daily 70 tablet 1    amitriptyline (ELAVIL) 10 MG tablet       escitalopram (LEXAPRO) 5 MG tablet Take 5 mg by mouth daily      drospirenone-ethinyl estradiol (JOLENE) 3-0.02 MG per tablet Take 1 tablet by mouth daily      tretinoin (RETIN-A) 0.05 % cream       Handicap Placard MISC by Does not apply route Permanent. 1 each 0     No current facility-administered medications for this visit.         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, there is sparing of the b/l DIP joints NTTP, full fist formation w/ good  strength              Wrist: mild synovitis in R>L wrist joints TTP, FROM              Elbow: no synovitis or bursitis, FROM  Lower extremities:              Knees: no warmth or effusion present, FROM              Ankles: no synovitis, FROM  INTEGUMENT: no skin thickening, no malar rash or psoriatic lesions, no petechiae, bruises, or palpable purpura, no patchy alopecia, no nail or periungual changes, no clubbing or digital ulcers    DATA:  Labs:    I personally reviewed interval labs and discussed w/ the pt in detail which showed:    Lab Results   Component Value Date    WBC 6.2 10/28/2021    HGB 14.7 10/28/2021    HCT 42.5 10/28/2021    MCV 91.8 10/28/2021     10/28/2021    LYMPHOPCT 43.7 10/28/2021    RBC 4.63 10/28/2021    MCH 31.8 10/28/2021    MCHC 34.6 10/28/2021    RDW 11.9 (L) 10/28/2021     Lab Results   Component Value Date     02/21/2019    K 3.7 02/21/2019     02/21/2019    CO2 23 02/21/2019    BUN 10 02/21/2019    CREATININE 0.7 10/28/2021    GLUCOSE 88 02/21/2019    CALCIUM 9.4 02/21/2019    PROT 7.4 02/21/2019    LABALBU 4.4 02/21/2019    LABALBU 4.0 02/21/2019    BILITOT 0.5 02/21/2019    ALKPHOS 35 (L) 02/21/2019    AST 15 10/28/2021    ALT 12 10/28/2021    LABGLOM >60 10/28/2021    GFRAA >60 10/28/2021    AGRATIO 1.5 02/21/2019    GLOB 3.0 02/21/2019     No results found for: VITD25     Lab Results   Component Value Date    C3 118.4 02/21/2019     Lab Results   Component Value Date    C4 12.8 02/21/2019     No results found for: [facility]     Lab Results   Component Value Date    CRP 4.4 07/24/2020    CRP 2.9 12/18/2019    CRP 1.3 02/08/2019     Lab Results   Component Value Date    SEDRATE 7 12/18/2019    SEDRATE 6 02/08/2019     Lab Results   Component Value Date    LABURIC 2.5 (L) 02/08/2019     RF 38.0, negative CCP (2/21/19)  Negative HLA B27 (2/21/19)  Negative DORCAS (2/21/19)  Negative SSA, SSB, scl-70 (3/14/19)  Negative LA, B2GP-1 IgM and IgG, aCL IgM and IgG (2/21/19)  Negative ANCA by IFA, MPO, PR3 (3/14/19)  Negative cryoglobulin (2/21/19)  Negative SPEP and UPEP (2/21/19)  Negative HIV, hepatitis B and C serologies (2/21/19)  Negative QuantiFERON (9/30/20)  VECTRA DA (9/30/20): 38 (moderate)  VECTRA DA (5/1/21): 32 (moderate)    Imaging:   I personally reviewed interval imaging and discussed w/ the pt in detail which included:    X-rays (2/21/19):  R hand: Bone mineralization is normal.  No fracture or dislocation. Joint alignment and joint spaces are maintained. No erosions are present. L hand: Bone mineralization is normal.  No fracture or dislocation. Joint alignment and joint spaces are maintained. No erosions are present. R foot: Bone mineralization is normal.  No fracture or dislocation. Joint alignment and joint spaces are maintained. No erosions are present. L foot: Bone mineralization is normal.  No fracture or dislocation. Joint alignment and joint spaces are maintained. No erosions are present. B/l SI joints: No fracture or dislocation. Sacral arcuate lines are intact. Evaluation is somewhat limited by overlying stool and bowel gas, however there is no evidence of ankylosis. Joint spaces appear maintained and no erosions are present. B/l hips appear well maintained and normal in position.      MRI hands (3/14/19):  R hand:  No evidence of RA in this nonenhanced exam.  L hand:  Incidental mild tendinosis of the flexor pollicis longus. No evidence of RA in this nonenhanced MRI of the hand. MRI b/l feet w/o contrast (3/4/19): Unremarkable study. Procedures:   EGD w/ Bx(8/2/18): Normal findings of the stomach, duodenal bulb and the duodenum.     Duodenal Bx:  Duodenal mucosa w/o significant histopathologic changes. Stomach Bx:  Mild chronic inactive gastritis, negative for Helicobacter-like organisms. Above results were discussed w/ the pt in detail during today's visit. ASSESSMENT/PLAN:   1. Seropositive rheumatoid arthritis ([address]  Assessment & Plan:  - remains active, difficult to control d/t intolerance of multiple DMARDs. Most recently pt reported significant hair loss on Leflunomide. As such we will switch Leflunomide to SSZ 1000 mg BID. She will cont Enbrel for now. Discussed switching Enbrel to another biologic DMARD such as COY-I, IL-6 inhibitor or Orencia if she cont to have active disease at her next f/u visit in 2 months. - she will take low dose Prednisone taper for RA flares. Orders:  -     Etanercept (ENBREL MINI) 50 MG/ML SOCT; Inject 50 mg into the skin every 7 days, Disp-12 each, R-0PA Number [id number]  -     naproxen (NAPROSYN) 500 MG tablet; Take 1 tablet by mouth 2 times daily as needed for Pain, Disp-180 tablet, R-0Normal  -     predniSONE (DELTASONE) 10 MG tablet; Take 2 tablets by mouth daily for 10 days, THEN 1 tablet daily for 10 days, THEN 0.5 tablets daily for 10 days. , Disp-35 tablet, R-2Normal  -     sulfaSALAzine (AZULFIDINE) 500 MG tablet; WK 1: 1 pill with dinner   WK 2: 1 pill with meal twice daily   WK 3: 1 pill with breakfast and 2 pills with dinner  WK 4: 2 pills with meal twice daily, Disp-70 tablet, R-1Normal  -     C-Reactive Protein; Future  2. Paresthesia and pain of both upper extremities  Assessment & Plan:  - discussed her Sx could be d/t CTS. Discussed checking EMG at f/u visit if her Sx persist/do not respond to Prednisone taper.   3. Tear of left acetabular labrum, subsequent encounter  Assessment & Plan:  - she saw Dr. Renaee Severin on 4/21/21 for L femoral anteversion and anterosuperior labrum tear. She has been offered surgical correction, discussed w/ pt that it's best that she holds off on surgery until her RA is better controlled. Will re-evaluate in 2 months. 4. High risk medication use  Assessment & Plan:  - d/w pt that SSZ can cause live function abnormality, bone marrow toxicity, nausea and GI s/e. Discussed need to check safety labs 4 wks after starting SSZ followed by safety labs Q3mo. - discussed her immunocompromised state. Pt stated that she has received the booster vaccine for COVID-19 as well as the annual influenza vaccine. Orders:  -     Hepatic Function Panel; Future  -     CBC Auto Differential; Future  -     Creatinine, Serum; Future  -     Quantiferon, Incubated; Future  5. Encounter for therapeutic drug monitoring  Assessment & Plan:  - renal function OK to cont NSAID. Orders:  -     Creatinine, Serum; Future  6. Gastroesophageal reflux disease without esophagitis  -     omeprazole (PRILOSEC) 40 MG delayed release capsule; Take 1 capsule by mouth every morning (before breakfast), Disp-90 capsule, R-0Normal    Return in about 2 months (around 1/30/2022) for lab result discussion and treatment plan, medication monitoring. The risks and benefits of my recommendations, as well as other treatment options, benefits and side effects were discussed with the patient today. Questions were answered. NOTE: This report is transcribed by using voice recognition software dragon. Every effort is made to ensure accuracy; however, inadvertent computerized  transcription errors may be present.

## 2021-11-30 ENCOUNTER — OFFICE VISIT (OUTPATIENT)
Dept: RHEUMATOLOGY | Age: 26
End: 2021-11-30
Payer: COMMERCIAL

## 2021-11-30 VITALS
HEART RATE: 96 BPM | OXYGEN SATURATION: 98 % | WEIGHT: 122.8 LBS | HEIGHT: 64 IN | DIASTOLIC BLOOD PRESSURE: 80 MMHG | BODY MASS INDEX: 20.96 KG/M2 | RESPIRATION RATE: 14 BRPM | SYSTOLIC BLOOD PRESSURE: 120 MMHG | TEMPERATURE: 97.2 F

## 2021-11-30 DIAGNOSIS — Z79.899 HIGH RISK MEDICATION USE: ICD-10-CM

## 2021-11-30 DIAGNOSIS — M79.601 PARESTHESIA AND PAIN OF BOTH UPPER EXTREMITIES: ICD-10-CM

## 2021-11-30 DIAGNOSIS — K21.9 GASTROESOPHAGEAL REFLUX DISEASE WITHOUT ESOPHAGITIS: ICD-10-CM

## 2021-11-30 DIAGNOSIS — M79.602 PARESTHESIA AND PAIN OF BOTH UPPER EXTREMITIES: ICD-10-CM

## 2021-11-30 DIAGNOSIS — S73.192D TEAR OF LEFT ACETABULAR LABRUM, SUBSEQUENT ENCOUNTER: ICD-10-CM

## 2021-11-30 DIAGNOSIS — M05.9 SEROPOSITIVE RHEUMATOID ARTHRITIS (HCC): Primary | ICD-10-CM

## 2021-11-30 DIAGNOSIS — R20.2 PARESTHESIA AND PAIN OF BOTH UPPER EXTREMITIES: ICD-10-CM

## 2021-11-30 DIAGNOSIS — Z51.81 ENCOUNTER FOR THERAPEUTIC DRUG MONITORING: ICD-10-CM

## 2021-11-30 PROCEDURE — 99215 OFFICE O/P EST HI 40 MIN: CPT | Performed by: INTERNAL MEDICINE

## 2021-11-30 RX ORDER — ETANERCEPT 50 MG/ML
50 SOLUTION SUBCUTANEOUS
Qty: 12 EACH | Refills: 0 | Status: SHIPPED | OUTPATIENT
Start: 2021-11-30 | End: 2022-02-02 | Stop reason: SDUPTHER

## 2021-11-30 RX ORDER — SULFASALAZINE 500 MG/1
TABLET ORAL
Qty: 70 TABLET | Refills: 1 | Status: SHIPPED | OUTPATIENT
Start: 2021-11-30 | End: 2022-02-02 | Stop reason: SDUPTHER

## 2021-11-30 RX ORDER — OMEPRAZOLE 40 MG/1
40 CAPSULE, DELAYED RELEASE ORAL
Qty: 90 CAPSULE | Refills: 0 | Status: SHIPPED | OUTPATIENT
Start: 2021-11-30 | End: 2022-08-16 | Stop reason: SDUPTHER

## 2021-11-30 RX ORDER — PREDNISONE 10 MG/1
TABLET ORAL
Qty: 35 TABLET | Refills: 2 | Status: SHIPPED | OUTPATIENT
Start: 2021-11-30 | End: 2021-12-30

## 2021-11-30 RX ORDER — NAPROXEN 500 MG/1
500 TABLET ORAL 2 TIMES DAILY PRN
Qty: 180 TABLET | Refills: 0 | Status: SHIPPED | OUTPATIENT
Start: 2021-11-30 | End: 2022-02-02 | Stop reason: SDUPTHER

## 2021-11-30 NOTE — ASSESSMENT & PLAN NOTE
- d/w pt that SSZ can cause live function abnormality, bone marrow toxicity, nausea and GI s/e. Discussed need to check safety labs 4 wks after starting SSZ followed by safety labs Q3mo. - discussed her immunocompromised state. Pt stated that she has received the booster vaccine for COVID-19 as well as the annual influenza vaccine.

## 2021-11-30 NOTE — ASSESSMENT & PLAN NOTE
- discussed her Sx could be d/t CTS. Discussed checking EMG at f/u visit if her Sx persist/do not respond to Prednisone taper.

## 2021-11-30 NOTE — ASSESSMENT & PLAN NOTE
- remains active, difficult to control d/t intolerance of multiple DMARDs. Most recently pt reported significant hair loss on Leflunomide. As such we will switch Leflunomide to SSZ 1000 mg BID. She will cont Enbrel for now. Discussed switching Enbrel to another biologic DMARD such as COY-I, IL-6 inhibitor or Orencia if she cont to have active disease at her next f/u visit in 2 months. - she will take low dose Prednisone taper for RA flares.

## 2021-11-30 NOTE — PATIENT INSTRUCTIONS
continues to be useful for treating for mild to moderate symptoms, or may be given along with other drugs for more severe symptoms of rheumatoid arthritis. It is also used for other conditions, including juvenile idiopathic arthritis (also called juvenile rheumatoid arthritis), ankylosing spondylitis, psoriatic arthritis, reactive arthritis, and ulcerative colitis. How it works Sulfasalazine is a DMARD. DMARDs work to decrease pain and inflammation, reduce/prevent joint damage, and preserve joint mobility. So, Sulfasalazine treats swelling, pain and stiffness in arthritis. Dosing Sulfasalazine comes in a 500 milligram tablet and should be taken with food and a full glass of water to avoid an upset stomach. The medication is often started at low doses when treating rheumatoid arthritis to prevent side effects, typically 1 to 2 tablets a day. After the first week, the dose may be slowly increased to the usual dosage of 2 tablets (1 gram) twice a day. This dose can be increased to up to 6 pills (3 grams) a day in some situations. An Enteric-coated (or stomach-coated) preparation is available that may lessen some of the side effects associated with sulfasalazine, particularly stomach upset. This form of sulfasalazine should not be crushed or chewed. Time to effect It usually takes between 1 and 3 months to notice any improvement in rheumatoid arthritis symptoms after starting sulfasalazine    Time to effect It usually takes between 1 and 3 months to notice any improvement in rheumatoid arthritis symptoms after starting sulfasalazine     Drug interactions Sulfasalazine may interfere with warfarin (Coumadin), cyclosporine or digoxin, so dose adjustments may be needed if these medications are taken together.  Sulfasalazine increases the risk for liver injury if given with the drug isoniazid (INH) for tuberculosis and may increase the risk for low blood sugar in patients taking certain diabetes drugs - sulfonylureas such as glimepiride (Amaryl), glyburide (Diabeta, Micronase, Glynase) and glipizide (Glucotrol). Tell your doctor if you have ever had any unusual or allergic reaction to any other sulfa medicines as well as medicines that are chemically related to sulfa drugs. Your doctor will then determine whether you should take sulfasalazine. Sulfa drugs and those related to them include: Some antibiotics (often used to treat urinary or upper respiratory infections): trimethoprim-sulfamethoxazole (Bactrim, Septra) sulfadiazine, sulfisoxizole (Gantrisin), and dapsone Fluid and blood pressure pills such as furosemide (Lasix) and thiazide diuretics (hydrochlorothiazide or HCTZ) Some diabetes medications Some glaucoma medications such as acetazolamide (Diamox), dichlorphenamide (Daranide) and methazolamide (Neptazane) Salicylates such as aspirin and the Le-2 inhibitor celecoxib (Celebrex)     - See more at: [email or web address]

## 2021-11-30 NOTE — ASSESSMENT & PLAN NOTE
- she saw Dr. Qian Carroll on 4/21/21 for L femoral anteversion and anterosuperior labrum tear. She has been offered surgical correction, discussed w/ pt that it's best that she holds off on surgery until her RA is better controlled. Will re-evaluate in 2 months.

## 2021-12-20 ENCOUNTER — TELEPHONE (OUTPATIENT)
Dept: RHEUMATOLOGY | Age: 26
End: 2021-12-20

## 2021-12-20 NOTE — TELEPHONE ENCOUNTER
This patient called requesting Dr. Charity Westfall send her another prescription for Zofran. She would like both types the regular and fast dissolving. She says Dr. Charity Westfall has prescribed it in the past however I did not find it so I was unable to pend. Please send to [place] [facility] [address] in [place]. Patient also informed that she will be changing insurance as of Jan 1st, 2021 and will require a new PA for her Enbrel.

## 2021-12-21 RX ORDER — ONDANSETRON 4 MG/1
4 TABLET, ORALLY DISINTEGRATING ORAL EVERY 8 HOURS PRN
Qty: 90 TABLET | Refills: 0 | Status: SHIPPED | OUTPATIENT
Start: 2021-12-21 | End: 2022-01-20

## 2021-12-21 RX ORDER — ONDANSETRON 4 MG/1
4 TABLET, FILM COATED ORAL EVERY 8 HOURS PRN
Qty: 90 TABLET | Refills: 0 | Status: SHIPPED | OUTPATIENT
Start: 2021-12-21 | End: 2022-01-20

## 2022-01-31 NOTE — PROGRESS NOTES
Niko Knight MD  [facility]) Physicians - Rheumatology    [x] [facility]:  [facility]  [address] [facility] [] [place]:  [address]   Office: [phone]  Fax: [phone]     Rheumatology Progress Note    SUBJECTIVE:    Background:   Princess Lawton is a 23 y.o. CF w/ seropositive RA (RF 38, negative CCP) and Raynaud's phenomenon. PMHx pertinent for IBS (per pt Dx by GI (Dr. Lenz) in 2018) and depression. Current rheum meds:  SSZ 1000 mg BID: started in 12/2021  Enbrel: started in 5/2020  Naproxen 500 mg BID PRN  Lexapro and Elavil: written by PCP    Prior rheum meds:  Naproxen 500 mg BID: improved joint Sx by 30%   mg daily: took from 3/19 to 7/19, caused diarrhea  MTX 4 tablets/wk: took from 4/2019-9/2021, switched to Leflunomide d/t fatigue  Humira: took from 11/2020-5/2020, ineffective  Leflunomide 10 mg daily: took from 9/2021-12/2021, switched to SSZ d/t hair loss  ASA 81 mg daily: per pt this did improve her feet Sx, she self d/c'ed as her Sx resolved    Past medical/surgical history, medications and allergies are reviewed and updated as appropriate. Interval Hx:   Pt states she started SSZ approximately 2 months ago. Denies any GI s/e on SSZ. She reports significant improvement in her arthralgias. Specifically, she reports improvement in her joint pain and stiffness as well as swelling. She developed a rash on her thighs, upper extremities and trunk 2 wks ago. Rash has since then resolved. Denies any cough, sore throat, SOB, or febrile illness. She reports worsening arthralgias since her rash. She tells me she rescheduled her L hip surgery/f/u visit to March.     Rheumatologic ROS:  Constitutional: denies chronic fatigue, fever/chills, night sweats, unintentional weight loss  Integumentary: denies photosensitivity, rash, patchy alopecia, or Sx of Raynaud's phenomenon  Eyes: denies dry eyes, redness or pain, visual disturbance, or floaters  Nose: denies nasal ulcers or recurrent sinusitis  Oral cavity: denies dry mouth or oral ulcers  Cardiovascular: denies CP, palpitations, Hx of pericardial effusion or pericarditis  Respiratory: denies SOB, cough, hemoptysis, or pleurisy  Musculoskeletal:  refer to above HPI     Allergies   Allergen Reactions    Cobalt Itching and Swelling     Patient did allergy testing   Causes redness too        Past Medical History:        Diagnosis Date    Polyarthritis with positive rheumatoid factor ([address] 2/12/2019       Past Surgical History:        Procedure Laterality Date    [facility]AR [facility]GERY [facility]    WISDOM TOOTH EXTRACTION         Medications:    Current Outpatient Medications   Medication Sig Dispense Refill    sulfaSALAzine (AZULFIDINE) 500 MG tablet Take 2 tablets by mouth 2 times daily 360 tablet 0    naproxen (NAPROSYN) 500 MG tablet Take 1 tablet by mouth 2 times daily as needed for Pain 180 tablet 0    Etanercept (ENBREL MINI) 50 MG/ML SOCT Inject 50 mg into the skin every 7 days 12 each 0    omeprazole (PRILOSEC) 40 MG delayed release capsule Take 1 capsule by mouth every morning (before breakfast) 90 capsule 0    amitriptyline (ELAVIL) 10 MG tablet       escitalopram (LEXAPRO) 5 MG tablet Take 5 mg by mouth daily      drospirenone-ethinyl estradiol (JOLENE) 3-0.02 MG per tablet Take 1 tablet by mouth daily      tretinoin (RETIN-A) 0.05 % cream       Handicap Placard MISC by Does not apply route Permanent. 1 each 0     No current facility-administered medications for this visit.         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, there is sparing of the b/l DIP joints NTTP, full fist formation w/ good  strength              Wrist: b/l wrist joints w/o synovitis NTTP, FROM              Elbow: no synovitis or bursitis, FROM  Lower extremities:              Knees: no warmth or effusion present, FROM              Ankles: no synovitis, FROM  INTEGUMENT: no active rash, no skin thickening, no malar rash or psoriatic lesions, no petechiae, bruises, or palpable purpura, no patchy alopecia, no nail or periungual changes, no clubbing or digital ulcers    DATA:  Labs:    I personally reviewed interval labs and discussed w/ the pt in detail which showed:    Lab Results   Component Value Date    WBC 6.2 10/28/2021    HGB 14.7 10/28/2021    HCT 42.5 10/28/2021    MCV 91.8 10/28/2021     10/28/2021    LYMPHOPCT 43.7 10/28/2021    RBC 4.63 10/28/2021    MCH 31.8 10/28/2021    MCHC 34.6 10/28/2021    RDW 11.9 (L) 10/28/2021     Lab Results   Component Value Date     02/21/2019    K 3.7 02/21/2019     02/21/2019    CO2 23 02/21/2019    BUN 10 02/21/2019    CREATININE 0.7 10/28/2021    GLUCOSE 88 02/21/2019    CALCIUM 9.4 02/21/2019    PROT 7.4 02/21/2019    LABALBU 4.4 02/21/2019    LABALBU 4.0 02/21/2019    BILITOT 0.5 02/21/2019    ALKPHOS 35 (L) 02/21/2019    AST 15 10/28/2021    ALT 12 10/28/2021    LABGLOM >60 10/28/2021    GFRAA >60 10/28/2021    AGRATIO 1.5 02/21/2019    GLOB 3.0 02/21/2019     No results found for: VITD25     Lab Results   Component Value Date    C3 118.4 02/21/2019     Lab Results   Component Value Date    C4 12.8 02/21/2019     No results found for: ANTIDSDNAIGG     Lab Results   Component Value Date    CRP 4.4 07/24/2020    CRP 2.9 12/18/2019    CRP 1.3 02/08/2019     Lab Results   Component Value Date    SEDRATE 7 12/18/2019    SEDRATE 6 02/08/2019     Lab Results   Component Value Date significant histopathologic changes. Stomach Bx:  Mild chronic inactive gastritis, negative for Helicobacter-like organisms. Above results were discussed w/ the pt in detail during today's visit. ASSESSMENT/PLAN:   1. Seropositive rheumatoid arthritis ([address]  Assessment & Plan:  - RA has been difficult to control d/t intolerance of multiple DMARDs. Objectively, inflammatory arthritis has improved since switching Leflunomide to SSZ. She had very mild synovitis in her R 4-5th PIP joints on exam.  - cont same dose SSZ 1000 mg BID and Enbrel. Discussed switching Enbrel to another biologic DMARD such as COY-I, IL-6 inhibitor or Orencia if she cont to have active disease at her next f/u visit in 2 months. - she will take low dose Prednisone taper for RA flares. - cont Naproxen 500 mg BID PRN. Orders:  -     sulfaSALAzine (AZULFIDINE) 500 MG tablet; Take 2 tablets by mouth 2 times daily, Disp-360 tablet, R-0Normal  -     naproxen (NAPROSYN) 500 MG tablet; Take 1 tablet by mouth 2 times daily as needed for Pain, Disp-180 tablet, R-0Normal  -     Etanercept (ENBREL MINI) 50 MG/ML SOCT; Inject 50 mg into the skin every 7 days, Disp-12 each, R-0PA Number [id number]  -     C-Reactive Protein; Future  2. Tear of left acetabular labrum, subsequent encounter  Assessment & Plan:  - she saw Dr. Doug Douglas on 4/21/21 for L femoral anteversion and anterosuperior labrum tear. She has been offered surgical correction, discussed w/ pt that it's best that she holds off on surgery until her RA is better controlled. Will re-evaluate in 2 months. - cont Naproxen 500 mg BID PRN. 3. Rash  Assessment & Plan:  - pt reported recent rash on thighs, upper extremities and trunk 2 wks ago. No active rash on exam today. Will need Dermatology evaluation if rash returns. 4. High risk medication use  Assessment & Plan:  - she is past due for safety labs for SSZ, she agreed to obtain labs now.   Orders:  -     Hepatic Function Panel; Future  -     CBC Auto Differential; Future  -     Creatinine, Serum; Future  -     Quantiferon, Incubated; Future  5. Encounter for therapeutic drug monitoring  Assessment & Plan:  - repeat renal function OK to cont NSAID. Orders:  -     Creatinine, Serum; Future    Return in about 2 months (around 4/2/2022) for lab result discussion and treatment plan, medication monitoring. The risks and benefits of my recommendations, as well as other treatment options, benefits and side effects were discussed with the patient today. Questions were answered. NOTE: This report is transcribed by using voice recognition software dragon. Every effort is made to ensure accuracy; however, inadvertent computerized  transcription errors may be present.

## 2022-02-02 ENCOUNTER — OFFICE VISIT (OUTPATIENT)
Dept: RHEUMATOLOGY | Age: 27
End: 2022-02-02
Payer: COMMERCIAL

## 2022-02-02 VITALS
HEART RATE: 107 BPM | BODY MASS INDEX: 21.68 KG/M2 | SYSTOLIC BLOOD PRESSURE: 118 MMHG | HEIGHT: 64 IN | DIASTOLIC BLOOD PRESSURE: 72 MMHG | WEIGHT: 127 LBS | OXYGEN SATURATION: 99 %

## 2022-02-02 DIAGNOSIS — Z79.899 HIGH RISK MEDICATION USE: ICD-10-CM

## 2022-02-02 DIAGNOSIS — S73.192D TEAR OF LEFT ACETABULAR LABRUM, SUBSEQUENT ENCOUNTER: ICD-10-CM

## 2022-02-02 DIAGNOSIS — R21 RASH: ICD-10-CM

## 2022-02-02 DIAGNOSIS — M05.9 SEROPOSITIVE RHEUMATOID ARTHRITIS (HCC): ICD-10-CM

## 2022-02-02 DIAGNOSIS — Z51.81 ENCOUNTER FOR THERAPEUTIC DRUG MONITORING: ICD-10-CM

## 2022-02-02 DIAGNOSIS — M05.9 SEROPOSITIVE RHEUMATOID ARTHRITIS (HCC): Primary | ICD-10-CM

## 2022-02-02 LAB
ALBUMIN SERPL-MCNC: 4.4 G/DL (ref 3.4–5)
ALP BLD-CCNC: 52 U/L (ref 40–129)
ALT SERPL-CCNC: 12 U/L (ref 10–40)
AST SERPL-CCNC: 19 U/L (ref 15–37)
BASOPHILS ABSOLUTE: 0 K/UL (ref 0–0.2)
BASOPHILS RELATIVE PERCENT: 0.5 %
BILIRUB SERPL-MCNC: 0.3 MG/DL (ref 0–1)
BILIRUBIN DIRECT: <0.2 MG/DL (ref 0–0.3)
BILIRUBIN, INDIRECT: NORMAL MG/DL (ref 0–1)
C-REACTIVE PROTEIN: 7.6 MG/L (ref 0–5.1)
CREAT SERPL-MCNC: 0.6 MG/DL (ref 0.6–1.1)
EOSINOPHILS ABSOLUTE: 0 K/UL (ref 0–0.6)
EOSINOPHILS RELATIVE PERCENT: 0.6 %
GFR AFRICAN AMERICAN: >60
GFR NON-AFRICAN AMERICAN: >60
HCT VFR BLD CALC: 38.2 % (ref 36–48)
HEMOGLOBIN: 13.5 G/DL (ref 12–16)
LYMPHOCYTES ABSOLUTE: 2.3 K/UL (ref 1–5.1)
LYMPHOCYTES RELATIVE PERCENT: 48 %
MCH RBC QN AUTO: 32.7 PG (ref 26–34)
MCHC RBC AUTO-ENTMCNC: 35.3 G/DL (ref 31–36)
MCV RBC AUTO: 92.6 FL (ref 80–100)
MONOCYTES ABSOLUTE: 0.4 K/UL (ref 0–1.3)
MONOCYTES RELATIVE PERCENT: 7.4 %
NEUTROPHILS ABSOLUTE: 2.1 K/UL (ref 1.7–7.7)
NEUTROPHILS RELATIVE PERCENT: 43.5 %
PDW BLD-RTO: 12.6 % (ref 12.4–15.4)
PLATELET # BLD: 201 K/UL (ref 135–450)
PMV BLD AUTO: 8.2 FL (ref 5–10.5)
RBC # BLD: 4.13 M/UL (ref 4–5.2)
TOTAL PROTEIN: 7.4 G/DL (ref 6.4–8.2)
WBC # BLD: 4.8 K/UL (ref 4–11)

## 2022-02-02 PROCEDURE — 99214 OFFICE O/P EST MOD 30 MIN: CPT | Performed by: INTERNAL MEDICINE

## 2022-02-02 RX ORDER — NAPROXEN 500 MG/1
500 TABLET ORAL 2 TIMES DAILY PRN
Qty: 180 TABLET | Refills: 0 | Status: SHIPPED | OUTPATIENT
Start: 2022-02-02 | End: 2022-04-06 | Stop reason: SDUPTHER

## 2022-02-02 RX ORDER — ETANERCEPT 50 MG/ML
50 SOLUTION SUBCUTANEOUS
Qty: 12 EACH | Refills: 0 | Status: SHIPPED | OUTPATIENT
Start: 2022-02-02 | End: 2022-02-21 | Stop reason: SDUPTHER

## 2022-02-02 RX ORDER — SULFASALAZINE 500 MG/1
1000 TABLET ORAL 2 TIMES DAILY
Qty: 360 TABLET | Refills: 0 | Status: SHIPPED | OUTPATIENT
Start: 2022-02-02 | End: 2022-04-06 | Stop reason: SDUPTHER

## 2022-02-02 NOTE — ASSESSMENT & PLAN NOTE
- RA has been difficult to control d/t intolerance of multiple DMARDs. Objectively, inflammatory arthritis has improved since switching Leflunomide to SSZ. She had very mild synovitis in her R 4-5th PIP joints on exam.  - cont same dose SSZ 1000 mg BID and Enbrel. Discussed switching Enbrel to another biologic DMARD such as COY-I, IL-6 inhibitor or Orencia if she cont to have active disease at her next f/u visit in 2 months. - she will take low dose Prednisone taper for RA flares. - cont Naproxen 500 mg BID PRN.

## 2022-02-02 NOTE — ASSESSMENT & PLAN NOTE
- pt reported recent rash on thighs, upper extremities and trunk 2 wks ago. No active rash on exam today. Will need Dermatology evaluation if rash returns.

## 2022-02-02 NOTE — ASSESSMENT & PLAN NOTE
- she saw Dr. Ronda Pérez on 4/21/21 for L femoral anteversion and anterosuperior labrum tear. She has been offered surgical correction, discussed w/ pt that it's best that she holds off on surgery until her RA is better controlled. Will re-evaluate in 2 months. - cont Naproxen 500 mg BID PRN.

## 2022-02-05 LAB
QUANTI TB GOLD PLUS: NEGATIVE
QUANTI TB1 MINUS NIL: 0 IU/ML (ref 0–0.34)
QUANTI TB2 MINUS NIL: 0 IU/ML (ref 0–0.34)
QUANTIFERON MITOGEN: 9.76 IU/ML
QUANTIFERON NIL: 0.03 IU/ML

## 2022-02-21 ENCOUNTER — TELEPHONE (OUTPATIENT)
Dept: RHEUMATOLOGY | Age: 27
End: 2022-02-21

## 2022-02-21 DIAGNOSIS — M05.9 SEROPOSITIVE RHEUMATOID ARTHRITIS (HCC): ICD-10-CM

## 2022-02-21 RX ORDER — ETANERCEPT 50 MG/ML
50 SOLUTION SUBCUTANEOUS
Qty: 12 EACH | Refills: 0 | Status: SHIPPED | OUTPATIENT
Start: 2022-02-21 | End: 2022-04-06

## 2022-02-21 NOTE — TELEPHONE ENCOUNTER
Pt called with [facility] pharmacy update. She is needing refills on the Enbrel. She reports that she was told that the MD wont have her change her medication until after her next appt. (note in labs). The office sent refills for Enbrel on 2/2/22. However, it went to [address] [place] [address] and not to [facility]. She switched insurance and needed to call the office regarding pharmacy details.

## 2022-02-25 ENCOUNTER — TELEPHONE (OUTPATIENT)
Dept: RHEUMATOLOGY | Age: 27
End: 2022-02-25

## 2022-02-25 NOTE — TELEPHONE ENCOUNTER
[facility] is requesting Clinical Documentation to be send over to [facility] at [phone] in order to submit PA Request to plan.  PA is for Enbrel Mini CRTG 50MG/ML

## 2022-03-03 NOTE — TELEPHONE ENCOUNTER
PA COVER MY MEDS  Medication:Enbrel Mini 50MG/ML cartridges  Key:[id number]  Status:Message from Plan  An active PA is already on file with expiration date of 08/24/2022. The PA approval letter was in Media . I have attached the letter this encounter.

## 2022-03-09 ENCOUNTER — TELEPHONE (OUTPATIENT)
Dept: RHEUMATOLOGY | Age: 27
End: 2022-03-09

## 2022-03-09 NOTE — TELEPHONE ENCOUNTER
Pt called stating that she is haing a real hard time getting the enbrel refilled. She called [facility] and was told that her medication requires a PA through insurance and they have contacted the office via fax several times with no response. The message from Feb advised that she already had an [place] on file. She advised that her insurance did change and the pharmacy will not refill her med and she is out. Wondering if someone could try and place PA.     The MA scanned PA form into chart this am.

## 2022-03-17 ENCOUNTER — TELEPHONE (OUTPATIENT)
Dept: RHEUMATOLOGY | Age: 27
End: 2022-03-17

## 2022-03-17 NOTE — TELEPHONE ENCOUNTER
Called patient left message I need to R/S her appointment. To call and just schedule with Dr Donya Hooper her soonest opening and if I get an cancellation then I can move her up.

## 2022-03-17 NOTE — TELEPHONE ENCOUNTER
----- Message from Kylee Mahmood MD sent at 3/17/2022 12:26 PM EDT -----  Pt is scheduled to see me at 3:40 PM on 4/6/22, please move her appt.  My last pt of the day is at 3:00 PM.

## 2022-04-06 ENCOUNTER — OFFICE VISIT (OUTPATIENT)
Dept: RHEUMATOLOGY | Age: 27
End: 2022-04-06
Payer: COMMERCIAL

## 2022-04-06 ENCOUNTER — TELEPHONE (OUTPATIENT)
Dept: RHEUMATOLOGY | Age: 27
End: 2022-04-06

## 2022-04-06 VITALS
DIASTOLIC BLOOD PRESSURE: 80 MMHG | SYSTOLIC BLOOD PRESSURE: 124 MMHG | HEART RATE: 100 BPM | BODY MASS INDEX: 21.34 KG/M2 | HEIGHT: 64 IN | WEIGHT: 125 LBS | OXYGEN SATURATION: 99 %

## 2022-04-06 DIAGNOSIS — F32.A DEPRESSION, UNSPECIFIED DEPRESSION TYPE: ICD-10-CM

## 2022-04-06 DIAGNOSIS — S73.192D TEAR OF LEFT ACETABULAR LABRUM, SUBSEQUENT ENCOUNTER: ICD-10-CM

## 2022-04-06 DIAGNOSIS — M05.79 RHEUMATOID ARTHRITIS INVOLVING MULTIPLE SITES WITH POSITIVE RHEUMATOID FACTOR (HCC): Primary | ICD-10-CM

## 2022-04-06 DIAGNOSIS — Z79.899 HIGH RISK MEDICATION USE: ICD-10-CM

## 2022-04-06 PROBLEM — M05.9 SEROPOSITIVE RHEUMATOID ARTHRITIS (HCC): Status: RESOLVED | Noted: 2021-11-30 | Resolved: 2022-04-06

## 2022-04-06 PROCEDURE — 99214 OFFICE O/P EST MOD 30 MIN: CPT | Performed by: INTERNAL MEDICINE

## 2022-04-06 RX ORDER — NAPROXEN 500 MG/1
500 TABLET ORAL 2 TIMES DAILY PRN
Qty: 180 TABLET | Refills: 0 | Status: SHIPPED
Start: 2022-04-06 | End: 2022-08-16 | Stop reason: SINTOL

## 2022-04-06 RX ORDER — ETANERCEPT 50 MG/ML
50 SOLUTION SUBCUTANEOUS
Qty: 12 EACH | Refills: 0 | Status: CANCELLED | OUTPATIENT
Start: 2022-04-06 | End: 2022-07-05

## 2022-04-06 RX ORDER — SULFASALAZINE 500 MG/1
1000 TABLET ORAL 2 TIMES DAILY
Qty: 360 TABLET | Refills: 0 | Status: SHIPPED | OUTPATIENT
Start: 2022-04-06 | End: 2022-08-16 | Stop reason: SDUPTHER

## 2022-04-06 RX ORDER — SARILUMAB 200 MG/1.14ML
200 INJECTION, SOLUTION SUBCUTANEOUS
Qty: 6 PEN | Refills: 0 | Status: SHIPPED | OUTPATIENT
Start: 2022-04-06 | End: 2022-04-19 | Stop reason: SDUPTHER

## 2022-04-06 RX ORDER — PREDNISONE 10 MG/1
TABLET ORAL
Qty: 35 TABLET | Refills: 1 | Status: SHIPPED | OUTPATIENT
Start: 2022-04-06 | End: 2022-05-06

## 2022-04-06 NOTE — PROGRESS NOTES
Joon Hussein MD  [facility]) Physicians - Rheumatology    [x] [facility]:  [facility]  [address] [facility] [] [facility]:  [address]   Office: [phone]  Fax: [phone]     RHEUMATOLOGY PROGRESS NOTE    ASSESSMENT/PLAN:  Angelica Lott is a 23 y.o. female w/ seropositive RA (RF 38, negative CCP) and Raynaud's phenomenon.       PMHx pertinent for IBS (per pt Dx by GI (Dr. Lenz) in 2018) and depression.     Current rheum meds:  SSZ 1000 mg BID: started in 12/2021  Enbrel: started in 5/2020  Naproxen 500 mg BID PRN  Lexapro: written by PCP     Prior rheum meds:   mg daily: took from 3/19 to 7/19, caused diarrhea  MTX 4 tablets/wk: took from 4/2019-9/2021, switched to Leflunomide d/t fatigue  Humira: took from 11/2020-5/2020, ineffective  Leflunomide 10 mg daily: took from 9/2021-12/2021, switched to SSZ d/t hair loss  Elavil    1. Rheumatoid arthritis involving multiple sites with positive rheumatoid factor (HCC)  Assessment & Plan:  - RA has been difficult to control d/t intolerance of multiple non-biologic DMARDs. RA remains active on SSZ and Enbrel. Her RAPID 3 score today is  - pt preferred to to an IL-6 inhibitor as she was reportedly told by her friends who reportedly developed acne on COY-I despite my reassurance that acne would be an unusual s/e. She agreed to switch Enbrel to Kevzara 200 mg SC Q2wk. - she will take low dose Prednisone taper for RA flares. - cont Naproxen 500 mg BID PRN. Orders:  -     sulfaSALAzine (AZULFIDINE) 500 MG tablet; Take 2 tablets by mouth 2 times daily, Disp-360 tablet, R-0Normal  -     naproxen (NAPROSYN) 500 MG tablet; Take 1 tablet by mouth 2 times daily as needed for Pain, Disp-180 tablet, R-0Normal  -     C-Reactive Protein; Future  -     predniSONE (DELTASONE) 10 MG tablet;  Take 2 tablets by mouth every morning for 10 days, THEN 1 tablet every morning for 10 days, THEN 0.5 tablets every morning for 10 days. , Disp-35 tablet, R-1Normal  -     sarilumab (KEVZARA) 200 MG/1. 14ML SOAJ injection; Inject 1.14 mLs into the skin every 14 days for 90 doses, Disp-6 pen, R-0Normal  2. Tear of left acetabular labrum, subsequent encounter  Assessment & Plan:  - she saw Dr. Marisol Ballesteros on 4/21/21 for L femoral anteversion and anterosuperior labrum tear. She has been offered surgical correction, discussed w/ pt that it's best that she holds off on surgery until her RA is better controlled. - cont Naproxen 500 mg BID PRN. Orders:  -     naproxen (NAPROSYN) 500 MG tablet; Take 1 tablet by mouth 2 times daily as needed for Pain, Disp-180 tablet, R-0Normal  3. Depression, unspecified depression type  Assessment & Plan:  - I counseled the pt on her depression, recommended switching Lexapro to Cymbalta for her anxiety/depression as well as arthralgias, consider adding Buspirone for better control of her anxiety. Will defer to PCP. 4. High risk medication use  Assessment & Plan:  - safety labs for SSZ due next month.  - discussed s/e and risks of Natasha Rojas and provided pt handout. Orders:  -     Hepatic Function Panel; Future  -     CBC with Auto Differential; Future  -     Creatinine; Future     Return in about 2 months (around 6/6/2022) for lab result discussion and treatment plan, medication monitoring. The risks and benefits of my recommendations, as well as other treatment options, benefits and side effects were discussed with the patient today. Questions were answered. NOTE: This report is transcribed by using voice recognition software dragon. Every effort is made to ensure accuracy; however, inadvertent computerized  transcription errors may be present. SUBJECTIVE:  Past medical/surgical history, medications and allergies are reviewed and updated as appropriate. Interval Hx:   Pt reports intermittent arthralgias in her hands and wrists. Hands hurt throughout the day nina when she types on the computer. Morning stiffness lasts 15 min. She tells me she's been having to take a Pred taper once every other month for RA flares which significantly improves her joint pain. She tells me she forgets to take SSZ approximately once/wk. She reports compliance w/ Enbrel. Pt is tearful when discussing her worsening depression. She tells me she stopped taking Elavil and remains on Lexapro prescribed by her PCP. ROS:  Constitutional: denies chronic fatigue, fever/chills, night sweats, unintentional weight loss  Integumentary: denies photosensitivity, rash, patchy alopecia, or Sx of Raynaud's phenomenon  Eyes: denies dry eyes, redness or pain, visual disturbance, or floaters  Nose: denies nasal ulcers or recurrent sinusitis  Oral cavity: denies dry mouth or oral ulcers  Cardiovascular: denies CP, palpitations, Hx of pericardial effusion or pericarditis  Respiratory: denies SOB, cough, hemoptysis, or pleurisy  Musculoskeletal:  refer to above HPI     Allergies   Allergen Reactions    Cobalt Itching and Swelling     Patient did allergy testing   Causes redness too        Past Medical History:        Diagnosis Date    Polyarthritis with positive rheumatoid factor ([facility] [address] 2/12/2019       Past Surgical History:        Procedure Laterality Date    IN[facility]AR [facility]GERY [facility] 2010    [facility]    WISDOM TOOTH EXTRACTION         Medications:    Current Outpatient Medications   Medication Sig Dispense Refill    sulfaSALAzine (AZULFIDINE) 500 MG tablet Take 2 tablets by mouth 2 times daily 360 tablet 0    naproxen (NAPROSYN) 500 MG tablet Take 1 tablet by mouth 2 times daily as needed for Pain 180 tablet 0    predniSONE (DELTASONE) 10 MG tablet Take 2 tablets by mouth every morning for 10 days, THEN 1 tablet every morning for 10 days, THEN 0.5 tablets every morning for 10 days. 35 tablet 1    sarilumab (KEVZARA) 200 MG/1. 14ML SOAJ injection Inject 1.14 mLs into the skin every 14 days for 90 doses 6 pen 0    omeprazole (PRILOSEC) 40 MG delayed release capsule Take 1 capsule by mouth every morning (before breakfast) 90 capsule 0    amitriptyline (ELAVIL) 10 MG tablet       escitalopram (LEXAPRO) 5 MG tablet Take 5 mg by mouth daily      drospirenone-ethinyl estradiol (JOLENE) 3-0.02 MG per tablet Take 1 tablet by mouth daily      tretinoin (RETIN-A) 0.05 % cream       Handicap Placard MISC by Does not apply route Permanent. 1 each 0     No current facility-administered medications for this visit. 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, there is sparing of the b/l DIP joints NTTP, full fist formation w/ good  strength              Wrist: R wrist joint w/ synovitis TTP, L wrist joint w/o swelling NTTP, FROM              Elbow: no synovitis or bursitis, FROM  Lower extremities:              Knees: no warmth or effusion present, FROM              Ankles: no synovitis, FROM  INTEGUMENT: no active rash, no skin thickening, no malar rash or psoriatic lesions, no petechiae, bruises, or palpable purpura, no patchy alopecia, no nail or periungual changes, no clubbing or digital ulcers    DATA:  Labs:   I personally reviewed interval labs and discussed w/ the pt in detail which showed:    Lab Results   Component Value Date    WBC 4.8 02/02/2022    HGB 13.5 02/02/2022    HCT 38.2 02/02/2022    MCV 92.6 02/02/2022     02/02/2022    LYMPHOPCT 48.0 02/02/2022    RBC 4.13 02/02/2022    MCH 32.7 02/02/2022    MCHC 35.3 02/02/2022    RDW 12.6 02/02/2022     Lab Results   Component Value Date  02/21/2019    K 3.7 02/21/2019     02/21/2019    CO2 23 02/21/2019    BUN 10 02/21/2019    CREATININE 0.6 02/02/2022    GLUCOSE 88 02/21/2019    CALCIUM 9.4 02/21/2019    PROT 7.4 02/02/2022    LABALBU 4.4 02/02/2022    BILITOT 0.3 02/02/2022    ALKPHOS 52 02/02/2022    AST 19 02/02/2022    ALT 12 02/02/2022    LABGLOM >60 02/02/2022    GFRAA >60 02/02/2022    AGRATIO 1.5 02/21/2019    GLOB 3.0 02/21/2019     No results found for: VITD25     Lab Results   Component Value Date    C3 118.4 02/21/2019     Lab Results   Component Value Date    C4 12.8 02/21/2019     No results found for: [facility]     Lab Results   Component Value Date    LABURIC 2.5 (L) 02/08/2019     Lab Results   Component Value Date    CRP 7.6 (H) 02/02/2022    CRP 4.4 07/24/2020    CRP 2.9 12/18/2019    CRP 1.3 02/08/2019     Lab Results   Component Value Date    SEDRATE 7 12/18/2019    SEDRATE 6 02/08/2019     VECTRA DA (9/30/20): 38 (moderate)  VECTRA DA (5/1/21): 32 (moderate)    No results found for: CKTOTAL    RF 38.0, negative CCP (2/21/19)  Negative HLA B27 (2/21/19)  Negative DORCAS (2/21/19)  Negative SSA, SSB, scl-70 (3/14/19)  Negative LA, B2GP-1 IgM and IgG, aCL IgM and IgG (2/21/19)  Negative ANCA by IFA, MPO, PR3 (3/14/19)  Negative cryoglobulin (2/21/19)  Negative hepatitis B and C serologies (2/21/19)  Negative QuantiFERON (2/2/22)    Imaging:  I personally reviewed interval imaging and discussed w/ the pt in detail which included:    X-rays (2/21/19):  R hand: Bone mineralization is normal.  No fracture or dislocation. Joint alignment and joint spaces are maintained.  No erosions are present.        L hand: Bone mineralization is normal.  No fracture or dislocation.  Joint alignment and joint spaces are maintained.  No erosions are present.        R foot: Bone mineralization is normal.  No fracture or dislocation.  Joint alignment and joint spaces are maintained.  No erosions are present.        L foot: Bone mineralization is normal.  No fracture or dislocation.  Joint alignment and joint spaces are maintained.  No erosions are present.       B/l SI joints: No fracture or dislocation.  Sacral arcuate lines are intact. Evaluation is somewhat limited by overlying stool and bowel gas, however there is no evidence of ankylosis.  Joint spaces appear maintained and no erosions are present.   B/l hips appear well maintained and normal in position.      MRI hands (3/14/19):  R hand:  No evidence of RA in this nonenhanced exam.  L hand:  Incidental mild tendinosis of the flexor pollicis longus.   No evidence of RA in this nonenhanced MRI of the hand.      MRI b/l feet w/o contrast (3/4/19): Unremarkable study. Procedures:  EGD w/ Bx(8/2/18):   Normal findings of the stomach, duodenal bulb and the duodenum.     Duodenal Bx:  Duodenal mucosa w/o significant histopathologic changes. Stomach Bx:  Mild chronic inactive gastritis, negative for Helicobacter-like organisms. Above results were discussed w/ the pt in detail during today's visit.

## 2022-04-06 NOTE — TELEPHONE ENCOUNTER
PA start for:     Sarilumab SOHEILA QUINTANILLA [facility]) 200mg/1.14ml injection  Inject 1.14ml into skin every 14 days for 90 doses    M05.79    Please obtain VOB and PA  RAPID3 SURVEY DONE

## 2022-04-06 NOTE — TELEPHONE ENCOUNTER
I have sent clinicals to [facility] [place] for a PA on [facility] [place]

## 2022-04-06 NOTE — ASSESSMENT & PLAN NOTE
- I counseled the pt on her depression, recommended switching Lexapro to Cymbalta for her anxiety/depression as well as arthralgias, consider adding Buspirone for better control of her anxiety. Will defer to PCP.

## 2022-04-06 NOTE — ASSESSMENT & PLAN NOTE
- she saw Dr. Shreya Shelton on 4/21/21 for L femoral anteversion and anterosuperior labrum tear. She has been offered surgical correction, discussed w/ pt that it's best that she holds off on surgery until her RA is better controlled. - cont Naproxen 500 mg BID PRN.

## 2022-04-06 NOTE — ASSESSMENT & PLAN NOTE
- RA has been difficult to control d/t intolerance of multiple non-biologic DMARDs. RA remains active on SSZ and Enbrel. Her RAPID 3 score today is  - pt preferred to to an IL-6 inhibitor as she was reportedly told by her friends who reportedly developed acne on COY-I despite my reassurance that acne would be an unusual s/e. She agreed to switch Enbrel to Kevzara 200 mg SC Q2wk. - she will take low dose Prednisone taper for RA flares. - cont Naproxen 500 mg BID PRN.

## 2022-04-18 DIAGNOSIS — M05.79 RHEUMATOID ARTHRITIS INVOLVING MULTIPLE SITES WITH POSITIVE RHEUMATOID FACTOR (HCC): ICD-10-CM

## 2022-04-18 NOTE — TELEPHONE ENCOUNTER
KEVZARA 200 MG/1. 14ML PEN 2PK  The prior authorization request has been approved and is in queue to be reprocessed.   PA Date Range: 03/13/2022 through 10/12/2022  PA Number: [id number]

## 2022-04-19 RX ORDER — SARILUMAB 200 MG/1.14ML
200 INJECTION, SOLUTION SUBCUTANEOUS
Qty: 6 PEN | Refills: 0 | Status: SHIPPED | OUTPATIENT
Start: 2022-04-19 | End: 2022-08-16

## 2022-04-19 NOTE — TELEPHONE ENCOUNTER
Called spoke with patient states her insurance covers for [facility]  Medication Lugenia Jose and Pharm pending    Unable to put order for Lugenia Jose in

## 2022-04-26 ENCOUNTER — TELEPHONE (OUTPATIENT)
Dept: RHEUMATOLOGY | Age: 27
End: 2022-04-26

## 2022-04-26 DIAGNOSIS — M05.79 RHEUMATOID ARTHRITIS INVOLVING MULTIPLE SITES WITH POSITIVE RHEUMATOID FACTOR (HCC): ICD-10-CM

## 2022-04-26 NOTE — TELEPHONE ENCOUNTER
Called spoke with patient-she is aware the PA is still in process for Mary Hernandez, I have called [facility] trying to see what is going on with the shipment. I spoke with Rebecca Acharya went over verbal questions for PA-it is now pending for Pharmacy review and will fax us or contact us on response.

## 2022-04-26 NOTE — TELEPHONE ENCOUNTER
Patient was wanting to see about increasing her Prednisone-she is still having pain-and while waiting on PA process with her Durenda Stella  She was given the prednisone 10mg taper

## 2022-04-27 RX ORDER — PREDNISONE 1 MG/1
TABLET ORAL
Qty: 52 TABLET | Refills: 0 | Status: SHIPPED | OUTPATIENT
Start: 2022-04-27 | End: 2022-05-13

## 2022-04-28 ENCOUNTER — TELEPHONE (OUTPATIENT)
Dept: RHEUMATOLOGY | Age: 27
End: 2022-04-28

## 2022-05-03 RX ORDER — SARILUMAB 200 MG/1.14ML
200 INJECTION, SOLUTION SUBCUTANEOUS
Qty: 6 PEN | Refills: 0 | Status: CANCELLED | OUTPATIENT
Start: 2022-05-03 | End: 2025-10-01

## 2022-05-03 NOTE — TELEPHONE ENCOUNTER
Information is available in [facility] [place] Portal.     The patients plan benefit design restricts filling of this medication to [address]  whose phone number is [phone] and fax number is [phone]. The preferred pharmacy will not accept a transfer from 3rd parties. Your office will need to submit a  valid prescription or E-scribe to the above pharmacy for processing. If eligible, Ginette Opitz will activate a co-pay card for the patient and provide the processing  information to the patient. If the patient is required to enroll themselves, we will provide the patient  with instructions on how to obtain a co-pay card. Pended RX to go to [facility].

## 2022-05-03 NOTE — TELEPHONE ENCOUNTER
Analia Sharma looks like Dawna Dominguez has approved the Natalie Doherty 6, what pharmacy was RX supposed to go to

## 2022-05-03 NOTE — TELEPHONE ENCOUNTER
Looking into her insurance-was approved from [facility] but sent to incorrect pharmacy per patients insurance

## 2022-05-04 ENCOUNTER — TELEPHONE (OUTPATIENT)
Dept: RHEUMATOLOGY | Age: 27
End: 2022-05-04

## 2022-05-04 NOTE — TELEPHONE ENCOUNTER
Received input from [facility] regarding issues with Meryrossi Espinal:     Can you let Huey Vines know that the insurance we had on file is through [employer] which the preferred pharmacy is [facility]. BUT I located a 2nd insurance through [employer]. I added to the account and emailed urgent to initiate PA. The preferred pharmacy for the [employer] will be [facility]. So I am waiting on the PA through the [facility] insurance right now. Please notify the patient that she was correct and the prior auth was sent to her previous coverage. [facility] is currently working on correcting the issue. We have to wait for the PA to go through the correct insurance.

## 2022-05-09 NOTE — TELEPHONE ENCOUNTER
Paddy Duane PA Appeal still in process as of 5/5/22  Scanned in [address] [place] [address]

## 2022-05-10 ENCOUNTER — TELEPHONE (OUTPATIENT)
Dept: RHEUMATOLOGY | Age: 27
End: 2022-05-10

## 2022-05-10 DIAGNOSIS — M05.79 RHEUMATOID ARTHRITIS INVOLVING MULTIPLE SITES WITH POSITIVE RHEUMATOID FACTOR (HCC): Primary | ICD-10-CM

## 2022-05-10 RX ORDER — UPADACITINIB 15 MG/1
15 TABLET, EXTENDED RELEASE ORAL DAILY
Qty: 90 TABLET | Refills: 0 | Status: SHIPPED | OUTPATIENT
Start: 2022-05-10 | End: 2022-05-19

## 2022-05-10 NOTE — TELEPHONE ENCOUNTER
Please let pt know her insurance denied Yany Rubalcava. They want her to try Reyna Ubiquigent or Rinvoq first. Please mail her information on Rinvoq, this is a daily oral pill she takes for her RA. Sent Rx to [facility], please f/u on PA.

## 2022-05-11 NOTE — TELEPHONE ENCOUNTER
I have sent clinicals to [facility] [place] for a   PA on [facility] CRISTOFER

## 2022-05-16 NOTE — TELEPHONE ENCOUNTER
Called spoke with patient-states she doesn't want to go on [place] or Rinvoq she is scared of side effects. She said she has already spoke with Dr Ortiz Bahena about these medications. She has been without her RA meds while waiting on PA process with her insurance. She is in pain. She does have Prednisone she can take.      She wants to know is there anything else she can take

## 2022-05-17 NOTE — TELEPHONE ENCOUNTER
This is not up to me, her insurance is asking her to try Cimzia/Simponi (similar mechanism of action as Enbrel) or Rinvoq/Xeljanz.

## 2022-05-17 NOTE — TELEPHONE ENCOUNTER
The patient is refusing the rinvoq or Maribell Shazia. Will not take those products. She will look at [facility] and [facility] to see if these would be ok. She is concerned that if the Enbrel did not work and feels the [facility] and [facility] will not work. She will look them up and read up on them. The denial letter also mentioned Elton Claude. She is wondering if this would be an option for her to try. She is asking to get 's opinion on the medication.

## 2022-05-18 NOTE — TELEPHONE ENCOUNTER
Patient is not going to try the Rinvoq. We did discuss the Orencia. she is ok to maybe try the medication. She is wondering if the office can get this sent ASAP due to not being on anything currently.

## 2022-05-18 NOTE — TELEPHONE ENCOUNTER
RINVOQ 15 MG ER TABLET  The prior authorization request has been approved and is in queue to be reprocessed.   PA Date Range: 05/13/2022 through 05/13/2023  PA Number: [id number]

## 2022-05-18 NOTE — TELEPHONE ENCOUNTER
We got Rinvoq approved. She was concerned about acne being a side effect - she had some friends who reportedly had this. I do not think she would have any issues w/ this. I strongly recommend Rinvoq. I agree that Cimzia and Simponi may not be effective as she has already failed Enbrel. I'm not sure if her insurance will approve Dinesh Rivera can send Rx if she wants to try this.

## 2022-05-18 NOTE — TELEPHONE ENCOUNTER
Patient called back and is just aggravated with it and feeling bad and just doesn't know what to do. She is really concerned about Rinvoq side effects. She really doesn't know what she wants to do.

## 2022-05-19 RX ORDER — ABATACEPT 125 MG/ML
1 INJECTION, SOLUTION SUBCUTANEOUS WEEKLY
Qty: 12 EACH | Refills: 0 | Status: SHIPPED | OUTPATIENT
Start: 2022-05-19 | End: 2022-05-26 | Stop reason: SDUPTHER

## 2022-05-19 NOTE — TELEPHONE ENCOUNTER
Reached out to Zhang Garcia at [facility] to see if there is a program with [employer]. Asked him to discontinue the Rinvoq, patient doesn't wish to move forward with this treatment. Per Zhang Garcia:   I think there is a quick start program but if the pa and appeal are denied then the best way to try and get [employer] would be applying the patient though the  PAP program.     Regarding Rinvoq, we will DC. And if you want to proceed with Orencia, just send an Rx and we will get to work! Spoke with [facility] and they advised that we can try to appeal the [employer]. Make sure to include any documentation, including the conversations with the pt and her concerns about the alternative products. Appeals ph# [phone]   Fax# [phone]. Printed records and faxed to the appeals dept. Marked urgent. They should respond with in 72 business hours.

## 2022-05-19 NOTE — TELEPHONE ENCOUNTER
Spoke with pt. She advised that she called her insurance company about her concerns. They advised that they would have no issue with approving the Orencia. She is ok with having  going forward with the [facility]. She is aware we will get an RX sent to [facility] [place] on Tuesday when the MD returns to the office.

## 2022-05-20 ENCOUNTER — TELEPHONE (OUTPATIENT)
Dept: ADMINISTRATIVE | Age: 27
End: 2022-05-20

## 2022-05-20 NOTE — TELEPHONE ENCOUNTER
I have sent clinicals to [facility] [place] for a   PA on [facility]

## 2022-05-25 ENCOUNTER — TELEPHONE (OUTPATIENT)
Dept: RHEUMATOLOGY | Age: 27
End: 2022-05-25

## 2022-05-25 DIAGNOSIS — M05.79 RHEUMATOID ARTHRITIS INVOLVING MULTIPLE SITES WITH POSITIVE RHEUMATOID FACTOR (HCC): ICD-10-CM

## 2022-05-25 NOTE — TELEPHONE ENCOUNTER
Liat called and has a PA approval for Rinvoq. It looks like it was discontinued and PA started for Orencia. Which one is patient supposed take? They need a call back in order to fill.  [phone]

## 2022-05-26 NOTE — TELEPHONE ENCOUNTER
There is multiple messages about this. Patient doesn't wish to move forward with the Rinvoq. This is why the medication was discontinued. Spoke with Pharmacist. She advised that the pt has authorization for Rinvoq and the pt has advised that she is not on this medication. She is on Orencia. She advised that she is the pharmacy provider for the pt and they do not have a script for either medication. Explained our office process in using [employer]. The Grazyna Slade is currently in the prior auth process currently. I did give her a verbal order to place on file once the PA has been approved, they can get the medication going as soon as possible for this patient.     Pended for MD signature

## 2022-05-27 RX ORDER — ABATACEPT 125 MG/ML
1 INJECTION, SOLUTION SUBCUTANEOUS WEEKLY
Qty: 12 EACH | Refills: 0 | OUTPATIENT
Start: 2022-05-27 | End: 2022-08-16 | Stop reason: SDUPTHER

## 2022-05-31 ENCOUNTER — TELEPHONE (OUTPATIENT)
Dept: RHEUMATOLOGY | Age: 27
End: 2022-05-31

## 2022-05-31 DIAGNOSIS — M05.79 RHEUMATOID ARTHRITIS INVOLVING MULTIPLE SITES WITH POSITIVE RHEUMATOID FACTOR (HCC): ICD-10-CM

## 2022-06-02 RX ORDER — ABATACEPT 125 MG/ML
1 INJECTION, SOLUTION SUBCUTANEOUS WEEKLY
Qty: 12 EACH | Refills: 0 | Status: CANCELLED | OUTPATIENT
Start: 2022-06-02 | End: 2022-08-31

## 2022-08-12 NOTE — PROGRESS NOTES
Verner Hench, MD  [facility]) Physicians - Rheumatology    [x] [facility]:  [facility] [address] [facility] [] [place] [address]:  [address]   Office: [phone]  Fax: [phone]     RHEUMATOLOGY PROGRESS NOTE    ASSESSMENT/PLAN:  Leobardo Combs is a 23 y.o. female w/ seropositive RA (RF 38, negative CCP) and fibromyalgia. PMHx pertinent for IBS (per pt Dx by GI (Dr. Stella Aquino) in 2018) and depression. Current rheum meds:  SSZ 1000 mg BID: started in 12/2021  Abatacept 125 mg SC wkly: started in 6/2022  Naproxen 500 mg BID PRN  Escitalopram: written by PCP     Prior rheum meds:   mg daily: took from 3/19 to 7/19, caused diarrhea  MTX 4 tablets/wk: took from 4/2019-9/2021, switched to Leflunomide d/t fatigue  Leflunomide 10 mg daily: took from 9/2021-12/2021, switched to SSZ d/t hair loss  Abatacept: took from 11/2020-5/2020, ineffective  Etanercept: took from 5/2020-8/2022, switched to Abatacept  Sarilumab: denied by insurance  Amitriptyline     1. Rheumatoid arthritis involving multiple sites with positive rheumatoid factor ([facility] [address]  Assessment & Plan:  - significantly improved since switching Etanercept to Abatacept. She has been taking SSZ inconsistently d/t dyspepsia. She had minimal residual synovitis in her R wrist joint today. - discussed switching SSZ to MTX SC (previously unable to tolerate d/t fatigue, but her chronic fatigue did not improve after stopping MTX) however pt preferred to remain on SSZ. She will call our office if she wishes to try MTX SC.  - cont Abatacept 125 mg SC wkly. - hold Naproxen d/t dyspepsia, pt has been taking this for migraines - advised her to f/u w/ PCP regarding migraine Tx. Orders:  -     C-Reactive Protein; Future  -     Abatacept (ORENCIA CLICKJECT) 644 MG/ML SOAJ; Inject 1 pen into the skin once a week, Disp-12 each, R-0Normal  -     DULoxetine (CYMBALTA) 60 MG extended release capsule;  Take 1 capsule by mouth in the morning., Disp-90 capsule, R-0Normal  -     sulfaSALAzine (AZULFIDINE) 500 MG tablet; Take 2 tablets by mouth in the morning and 2 tablets before bedtime. , Disp-360 tablet, R-0Normal  2. Fibromyalgia  Assessment & Plan:  - discussed the Dx, pathophysiology and management options of fibromyalgia w/ the pt in detail. Discussed that fibromyalgia is a non-life threatening and non-rheumatic disease. Discussed the various Tx options  including the medical management and PT/aquatic therapy as well as self exercises. Discussed various FDA approved (Cymbalta, Lyrica, Gabapentin, Savella) and non-FDA approved medications (muscle relaxants, SSRI's) w/ the pt. Provided detailed handout on fibromyalgia to the pt. - would avoid Gabapentinoids and muscle relaxants d/t chronic fatigue.  - she will switch Escitalopram to Duloxetine 60 mg daily. Orders:  -     DULoxetine (CYMBALTA) 60 MG extended release capsule; Take 1 capsule by mouth in the morning., Disp-90 capsule, R-0Normal  3. Tear of left acetabular labrum, subsequent encounter  Assessment & Plan:  - she saw Dr. Margarita Nieto on 4/21/21 for L femoral anteversion and anterosuperior labrum tear. She has been offered surgical correction. We discussed francisco javier-operative management of her DMARDs. 4. Erythromelalgia ([address]  Assessment & Plan:  - recommended trying OTC ASA 81 mg daily. 5. Chronic depression  Assessment & Plan:  - switch Escitalopram to Duloxetine 60 mg daily. - we discussed counseling for her anxiety and depression. Orders:  -     DULoxetine (CYMBALTA) 60 MG extended release capsule; Take 1 capsule by mouth in the morning., Disp-90 capsule, R-0Normal  6. High risk medication use  Assessment & Plan:  - she is past due for safety labs for SSZ.  - discussed her immunocompromised state and indications to hold her immunosuppression.  She will call us if she plans to schedule hip surgery, discussed perioperative management of her DMARDs. Orders:  -     Hepatic Function Panel; Future  -     CBC with Auto Differential; Future  -     Creatinine; Future  7. Chronic fatigue  Assessment & Plan:  - has not improved since stopping MTX.  - repeat acute phase reactants, RA appears to be better controlled currently. - advised pt to f/u w/ PCP for further testing, discussed sleep study. Orders:  -     C-Reactive Protein; Future  -     Vitamin D 25 Hydroxy; Future  8. Gastroesophageal reflux disease without esophagitis  Assessment & Plan:  - hold Naproxen. - cont Omeprazole, pt declined Rx as she stated this is not covered by her insurance and will buy at [facility].     Return in about 3 months (around 11/16/2022) for lab result discussion and treatment plan, medication monitoring. TIME SPENT TODAY:  I spent over 40 minutes of face-to-face time with the pt (including taking interval history and performing physical exam, review of medical records, independently interpreting results and communicating results to the pt/family/caregiver, counseling and educating the pt on her new Dx of fibromyalgia, implementation of treatment plan, coordination of care, documenting clinical information in the EMR) during today visit. At least 50% of this time was spent in counseling, explanation of diagnosis, planning of further management, and coordination of care. The risks and benefits of my recommendations, as well as other treatment options, benefits and side effects were discussed with the patient today. Questions were answered. NOTE: This report is transcribed by using voice recognition software dragon. Every effort is made to ensure accuracy; however, inadvertent computerized  transcription errors may be present. SUBJECTIVE:  Past medical/surgical history, medications and allergies are reviewed and updated as appropriate. Interval Hx:   Pt states she switched Etanercept from Abatacept SC in June.  She reports noticeable improvement in her arthralgias and joint stiffness on Abatacept. She denies any significant arthralgias today. She tells me she has not been taking SSZ consistently d/t dyspepsia. She has been taking it every other wk. She reports generalized pain everywhere. Tips of toes turn red and become itchy. She reports chronic fatigue. Denies snoring. Fatigue has not improved after stopping MTX. She is taking Escitalopram for depression, she feels this is \"OK\". ROS:  Constitutional: denies chronic fatigue, fever/chills, night sweats, unintentional weight loss  Integumentary: denies photosensitivity, rash, patchy alopecia, or Sx of Raynaud's phenomenon  Eyes: denies dry eyes, redness or pain, visual disturbance, or floaters  Nose: denies nasal ulcers or recurrent sinusitis  Oral cavity: denies dry mouth or oral ulcers  Cardiovascular: denies CP, palpitations, Hx of pericardial effusion or pericarditis  Respiratory: denies SOB, cough, hemoptysis, or pleurisy  Musculoskeletal:  refer to above HPI     Allergies   Allergen Reactions    Cobalt Itching and Swelling     Patient did allergy testing   Causes redness too        Past Medical History:        Diagnosis Date    Polyarthritis with positive rheumatoid factor ([facility] [address] 2/12/2019       Past Surgical History:        Procedure Laterality Date    INNER EAR SURGERY Left 2010    [facility]    WISDOM TOOTH EXTRACTION         Medications:    Current Outpatient Medications   Medication Sig Dispense Refill    Abatacept (ORENCIA CLICKJECT) 981 MG/ML SOAJ Inject 1 pen into the skin once a week 12 each 0    DULoxetine (CYMBALTA) 60 MG extended release capsule Take 1 capsule by mouth in the morning. 90 capsule 0    sulfaSALAzine (AZULFIDINE) 500 MG tablet Take 2 tablets by mouth in the morning and 2 tablets before bedtime.  360 tablet 0    amitriptyline (ELAVIL) 10 MG tablet       drospirenone-ethinyl estradiol (JOLENE) 3-0.02 MG per tablet Take 1 tablet by mouth daily      tretinoin (RETIN-A) 0.05 % cream       Handicap Placard MISC by Does not apply route Permanent. 1 each 0     No current facility-administered medications for this visit. 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, full fist formation w/ good  strength              Wrist: prior synovitis in the R wrist joint has significantly improved since previously slightly TTP, L wrist joint w/o swelling NTTP, FROM              Elbow: no synovitis or bursitis, FROM  Lower extremities:              Knees: no warmth or effusion present, FROM              Ankles: no synovitis, FROM   Feet: no toe swelling, toes w/ mild erythema  INTEGUMENT: no active rash, no skin thickening, no malar rash or psoriatic lesions, no petechiae, bruises, or palpable purpura, no patchy alopecia, no nail or periungual changes, no clubbing or digital ulcers    DATA:  Labs:   I personally reviewed interval labs and discussed w/ the pt in detail which showed:    Lab Results   Component Value Date    WBC 4.8 02/02/2022    HGB 13.5 02/02/2022    HCT 38.2 02/02/2022    MCV 92.6 02/02/2022     02/02/2022    LYMPHOPCT 48.0 02/02/2022    RBC 4.13 02/02/2022    MCH 32.7 02/02/2022    MCHC 35.3 02/02/2022    RDW 12.6 02/02/2022     Lab Results   Component Value Date     02/21/2019    K 3.7 02/21/2019     02/21/2019    CO2 23 02/21/2019    BUN 10 02/21/2019    CREATININE 0.6 02/02/2022    GLUCOSE 88 02/21/2019    CALCIUM 9.4 02/21/2019    PROT 7.4 02/02/2022    LABALBU 4.4

## 2022-08-16 ENCOUNTER — OFFICE VISIT (OUTPATIENT)
Dept: RHEUMATOLOGY | Age: 27
End: 2022-08-16
Payer: COMMERCIAL

## 2022-08-16 VITALS — WEIGHT: 120 LBS | HEIGHT: 64 IN | BODY MASS INDEX: 20.49 KG/M2

## 2022-08-16 DIAGNOSIS — R53.82 CHRONIC FATIGUE: ICD-10-CM

## 2022-08-16 DIAGNOSIS — M05.79 RHEUMATOID ARTHRITIS INVOLVING MULTIPLE SITES WITH POSITIVE RHEUMATOID FACTOR (HCC): Primary | ICD-10-CM

## 2022-08-16 DIAGNOSIS — Z79.899 HIGH RISK MEDICATION USE: ICD-10-CM

## 2022-08-16 DIAGNOSIS — K21.9 GASTROESOPHAGEAL REFLUX DISEASE WITHOUT ESOPHAGITIS: ICD-10-CM

## 2022-08-16 DIAGNOSIS — S73.192D TEAR OF LEFT ACETABULAR LABRUM, SUBSEQUENT ENCOUNTER: ICD-10-CM

## 2022-08-16 DIAGNOSIS — M79.7 FIBROMYALGIA: ICD-10-CM

## 2022-08-16 DIAGNOSIS — I73.81 ERYTHROMELALGIA (HCC): ICD-10-CM

## 2022-08-16 DIAGNOSIS — M05.79 RHEUMATOID ARTHRITIS INVOLVING MULTIPLE SITES WITH POSITIVE RHEUMATOID FACTOR (HCC): ICD-10-CM

## 2022-08-16 DIAGNOSIS — F32.A CHRONIC DEPRESSION: ICD-10-CM

## 2022-08-16 PROBLEM — I73.00 RAYNAUD'S PHENOMENON WITHOUT GANGRENE: Status: RESOLVED | Noted: 2020-09-23 | Resolved: 2022-08-16

## 2022-08-16 LAB
ALBUMIN SERPL-MCNC: 4.6 G/DL (ref 3.4–5)
ALP BLD-CCNC: 64 U/L (ref 40–129)
ALT SERPL-CCNC: 10 U/L (ref 10–40)
AST SERPL-CCNC: 16 U/L (ref 15–37)
BASOPHILS ABSOLUTE: 0 K/UL (ref 0–0.2)
BASOPHILS RELATIVE PERCENT: 0.5 %
BILIRUB SERPL-MCNC: <0.2 MG/DL (ref 0–1)
BILIRUBIN DIRECT: <0.2 MG/DL (ref 0–0.3)
BILIRUBIN, INDIRECT: NORMAL MG/DL (ref 0–1)
C-REACTIVE PROTEIN: 9.9 MG/L (ref 0–5.1)
CREAT SERPL-MCNC: 0.7 MG/DL (ref 0.6–1.1)
EOSINOPHILS ABSOLUTE: 0 K/UL (ref 0–0.6)
EOSINOPHILS RELATIVE PERCENT: 0.6 %
GFR AFRICAN AMERICAN: >60
GFR NON-AFRICAN AMERICAN: >60
HCT VFR BLD CALC: 40.7 % (ref 36–48)
HEMOGLOBIN: 14.4 G/DL (ref 12–16)
LYMPHOCYTES ABSOLUTE: 3 K/UL (ref 1–5.1)
LYMPHOCYTES RELATIVE PERCENT: 46.1 %
MCH RBC QN AUTO: 32.3 PG (ref 26–34)
MCHC RBC AUTO-ENTMCNC: 35.4 G/DL (ref 31–36)
MCV RBC AUTO: 91.4 FL (ref 80–100)
MONOCYTES ABSOLUTE: 0.4 K/UL (ref 0–1.3)
MONOCYTES RELATIVE PERCENT: 6.9 %
NEUTROPHILS ABSOLUTE: 3 K/UL (ref 1.7–7.7)
NEUTROPHILS RELATIVE PERCENT: 45.9 %
PDW BLD-RTO: 12.2 % (ref 12.4–15.4)
PLATELET # BLD: 213 K/UL (ref 135–450)
PMV BLD AUTO: 8.3 FL (ref 5–10.5)
RBC # BLD: 4.45 M/UL (ref 4–5.2)
TOTAL PROTEIN: 7.3 G/DL (ref 6.4–8.2)
VITAMIN D 25-HYDROXY: 45.8 NG/ML
WBC # BLD: 6.5 K/UL (ref 4–11)

## 2022-08-16 PROCEDURE — 99215 OFFICE O/P EST HI 40 MIN: CPT | Performed by: INTERNAL MEDICINE

## 2022-08-16 RX ORDER — SULFASALAZINE 500 MG/1
1000 TABLET ORAL 2 TIMES DAILY
Qty: 360 TABLET | Refills: 0 | Status: SHIPPED
Start: 2022-08-16 | End: 2022-08-20 | Stop reason: SINTOL

## 2022-08-16 RX ORDER — DULOXETIN HYDROCHLORIDE 60 MG/1
60 CAPSULE, DELAYED RELEASE ORAL DAILY
Qty: 90 CAPSULE | Refills: 0 | Status: SHIPPED | OUTPATIENT
Start: 2022-08-16 | End: 2022-08-16

## 2022-08-16 RX ORDER — OMEPRAZOLE 40 MG/1
40 CAPSULE, DELAYED RELEASE ORAL
Qty: 90 CAPSULE | Refills: 0 | Status: SHIPPED | OUTPATIENT
Start: 2022-08-16 | End: 2022-08-16

## 2022-08-16 RX ORDER — SULFASALAZINE 500 MG/1
1000 TABLET ORAL 2 TIMES DAILY
Qty: 360 TABLET | Refills: 0 | Status: SHIPPED | OUTPATIENT
Start: 2022-08-16 | End: 2022-08-16 | Stop reason: SDUPTHER

## 2022-08-16 RX ORDER — DULOXETIN HYDROCHLORIDE 60 MG/1
60 CAPSULE, DELAYED RELEASE ORAL DAILY
Qty: 90 CAPSULE | Refills: 0 | Status: SHIPPED | OUTPATIENT
Start: 2022-08-16 | End: 2022-11-14

## 2022-08-16 RX ORDER — ABATACEPT 125 MG/ML
1 INJECTION, SOLUTION SUBCUTANEOUS WEEKLY
Qty: 12 EACH | Refills: 0 | Status: SHIPPED | OUTPATIENT
Start: 2022-08-16 | End: 2022-11-14

## 2022-08-16 NOTE — ASSESSMENT & PLAN NOTE
- she saw Dr. Margarita Nieto on 4/21/21 for L femoral anteversion and anterosuperior labrum tear. She has been offered surgical correction. We discussed francisco javier-operative management of her DMARDs.

## 2022-08-16 NOTE — ASSESSMENT & PLAN NOTE
- hold Naproxen.   - cont Omeprazole, pt declined Rx as she stated this is not covered by her insurance and will buy at [facility].

## 2022-08-16 NOTE — ASSESSMENT & PLAN NOTE
- switch Escitalopram to Duloxetine 60 mg daily. - we discussed counseling for her anxiety and depression.

## 2022-08-16 NOTE — ASSESSMENT & PLAN NOTE
- has not improved since stopping MTX.  - repeat acute phase reactants, RA appears to be better controlled currently. - advised pt to f/u w/ PCP for further testing, discussed sleep study.

## 2022-08-16 NOTE — ASSESSMENT & PLAN NOTE
- she is past due for safety labs for SSZ.  - discussed her immunocompromised state and indications to hold her immunosuppression. She will call us if she plans to schedule hip surgery, discussed perioperative management of her DMARDs.

## 2022-08-16 NOTE — ASSESSMENT & PLAN NOTE
- significantly improved since switching Etanercept to Abatacept. She has been taking SSZ inconsistently d/t dyspepsia. She had minimal residual synovitis in her R wrist joint today. - discussed switching SSZ to MTX SC (previously unable to tolerate d/t fatigue, but her chronic fatigue did not improve after stopping MTX) however pt preferred to remain on SSZ. She will call our office if she wishes to try MTX SC.  - cont Abatacept 125 mg SC wkly. - hold Naproxen d/t dyspepsia, pt has been taking this for migraines - advised her to f/u w/ PCP regarding migraine Tx.

## 2022-08-16 NOTE — PATIENT INSTRUCTIONS
Fibromyalgia           Fast Facts  Fibromyalgia affects two to four percent of people, women more often than men. Doctors diagnose fibromyalgia based on all the patients relevant symptoms (what you feel), no longer just on the number of tender places during an examination. There is no test to detect this disease, but you may need lab tests or X-rays to rule out other health problems. Though there is no cure, medications can reduce symptoms in some patients. Patients also may feel better with proper self-care, such as exercise and getting enough sleep. Fibromyalgia is a common health problem that causes widespread pain and tenderness (sensitivity to touch). The pain and tenderness tend to come and go, and move about the body. Most often, people with this chronic (long-term) illness are fatigued (very tired) and have sleep problems. The diagnosis can be made with a careful examination. Fibromyalgia is most common in women, though it can occur in men. It most often starts in middle adulthood, but can occur in the teen years and in old age. You are at higher risk for fibromyalgia if you have a rheumatic disease (health problem that affects the joints, muscles and bones). These include osteoarthritis, lupus, rheumatoid arthritis or ankylosing spondylitis. What is fibromyalgia? Fibromyalgia is a chronic health problem that causes pain all over the body and other symptoms.  Other symptoms of fibromyalgia that patients most often have are:  Tenderness to touch or pressure affecting muscles and sometimes joints or even the skin   Severe fatigue   Sleep problems (waking up unrefreshed)   Problems with memory or thinking clearly  Some patients also may have:  Depression or anxiety   Migraine or tension headaches   Digestive problems: irritable bowel syndrome (commonly called IBS) or gastroesophageal reflux disease (often referred to as GERD)   Irritable or overactive bladder   Pelvic pain   Temporomandibular disorder--often called TMJ (a set of symptoms including face or jaw pain, jaw clicking and ringing in the ears)  Symptoms of fibromyalgia and its related problems can vary in intensity, and will wax and wane over time. Stress often worsens the symptoms. What causes fibromyalgia? The causes of fibromyalgia are unclear. They may be different in different people. Fibromyalgia may run in families. There likely are certain genes that can make people more prone to getting fibromyalgia and the other health problems that can occur with it. Genes alone, though, do not cause fibromyalgia. There is most often some triggering factor that sets off fibromyalgia. It may be spine problems, arthritis, injury, or other type of physical stress. Emotional stress also may trigger this illness. The result is a change in the way the body talks with the spinal cord and brain. Levels of brain chemicals and proteins may change. For the person with fibromyalgia, it is as though the volume control is turned up too high in the brain's pain processing centers. How is fibromyalgia diagnosed? A doctor will suspect fibromyalgia based on your symptoms. Doctors may require that you have tenderness to pressure or tender points at a specific number of certain spots before saying you have fibromyalgia, but they are not required to make the diagnosis (see the Box). A physical exam can be helpful to detect tenderness and to exclude other causes of muscle pain. There are no diagnostic tests (such as X-rays or blood tests) for this problem. Yet, you may need tests to rule out another health problem that can be confused with fibromyalgia. Because widespread body pain is the main feature of fibromyalgia, health care providers will ask you to describe your pain. This may help tell the difference between fibromyalgia and other diseases with similar symptoms.  Other conditions such as hypothyroidism (underactive thyroid gland) and polymyalgia rheumatica sometimes mimic fibromyalgia. Blood tests can tell if you have either of these problems. Sometimes, fibromyalgia is confused with rheumatoid arthritis or lupus. But, again, there is a difference in the symptoms, physical findings and blood tests that will help your health care provider detect these health problems. Unlike fibromyalgia, these rheumatic diseases cause inflammation in the joints and tissues. Criteria Needed for a Fibromyalgia Diagnosis       1. Pain and symptoms over the past week, based on the total of: Number of painful areas out of 19 parts of the body Plus level of severity of these symptoms: a. Fatigue b. Waking unrefreshed c. Cognitive (memory or thought) problems Plus number of other general physical symptoms    2. Symptoms lasting at least three months at a similar level   3. No other health problem that would explain the pain and other          How is fibromyalgia treated? There is no cure for fibromyalgia. However, symptoms can be treated with both medication and non-drug treatments. Many times the best outcomes are achieved by using multiple types of treatments. Medications: The U.[place]. Food and Drug Administration has approved three drugs for the treatment of fibromyalgia. They include two drugs that change some of the brain chemicals (serotonin and norepinephrine) that help control pain levels: duloxetine (Cymbalta) and milnacipran (Savella). Older drugs that affect these same brain chemicals also may be used to treat fibromyalgia. These include amitriptyline (Elavil) and cyclobenzaprine (Flexeril). Other antidepressant drugs can be helpful in some patients. Side effects vary by the drug. Ask your doctor about the risks and benefits of your medicine. The other drug approved for fibromyalgia is pregabalin (Lyrica). Pregabalin and another drug, gabapentin (Neurontin), work by blocking the over activity of nerve cells involved in pain transmission.  These medicines may cause dizziness, sleepiness, swelling and weight gain. Doctors do not recommend opioid narcotics for treating fibromyalgia. The reason for this is that research evidence suggests these drugs are not of great benefit to most people with fibromyalgia. In fact, they may cause greater pain sensitivity or make pain persist. Tramadol (Ultram) may be used to treat fibromyalgia pain if short-term use of an opioid narcotic is needed. Over-the-counter medicines such as acetaminophen (Tylenol) or nonsteroidal anti-inflammatory drugs (commonly called NSAIDs) like ibuprofen (Advil, Motrin) or naproxen (Aleve, Anaprox) are not effective for fibromyalgia pain. Yet, these drugs may be useful to treat the pain triggers of fibromyalgia. Thus, they are most useful in people who have other causes for pain such as arthritis in addition to fibromyalgia. For sleep problems, some of the medicines that treat pain also improve sleep. These include cyclobenzaprine (Flexeril), amitriptyline (Elavil), gabapentin (Neurontin) or pregabalin (Lyrica). It is not recommended that patients with fibromyalgia take sleeping medicines like zolpidem (Ambien) or benzodiazepine medications. Other Therapies: People with fibromyalgia should use non-drug treatments as well as any medicines their doctors suggest. Research shows that the most effective treatment for fibromyalgia is physical exercise. Physical exercise should be used in addition to any drug treatment. Patients benefit most from aerobic exercises. Other body-based therapies including Florin Chi and yoga can ease fibromyalgia symptoms. Cognitive behavioral therapy is a type of therapy focused on understanding how thoughts and behaviors affect pain and other symptoms. CBT and related treatments such as mindfulness can help patients learn symptom reduction skills that lessen pain.   Other complementary and alternative therapies (sometimes called CAM or integrative medicine), such as acupuncture, chiropractic and massage therapy, can be useful to manage fibromyalgia symptoms. Many of these treatments, though, have not been well tested in patients with fibromyalgia. Living with fibromyalgia  Even with the many treatment options, patient self-care is vital to improving symptoms and daily function. In concert with medical treatment, healthy lifestyle behaviors can reduce pain, increase sleep quality, lessen fatigue and help you cope better with fibromyalgia. With proper treatment and self-care, you can get better and live a more normal life. Here are some self-care tips for living with fibromyalgia:  Make time to relax each day. Deep-breathing exercises and meditation will help reduce the stress that can bring on symptoms. Set a regular sleep pattern. Go to bed and wake up at the same time each day. Getting enough sleep lets your body repair itself, physically and mentally. Also, avoid daytime napping and limit caffeine intake, which can disrupt sleep. Nicotine is a stimulant, so those fibromyalgia patients with sleep problems should stop smoking. Exercise often. This is a very important part of fibromyalgia treatment. While difficult at first, regular exercise often reduces pain symptoms and fatigue. Patients should follow the saying, Start low, go slow.  Slowly add daily fitness into your routine. For instance, take the stairs instead of the elevator, or park further away from the store. As your symptoms decrease with drug treatments, start increasing your activity. Add in some walking, swimming, water aerobics and/or stretching exercises, and begin to do things that you stopped doing because of your pain and other symptoms. It takes time to create a comfortable routine. Just get moving, stay active and don't give up! Educate yourself. Nationally recognized organizations like the [facility] are great resources for information.  Share this information with family, friends and co-workers. Look forward, not backward. Focus on what you need to do to get better, not what caused your illness. The role of the rheumatologist  Fibromyalgia is not a form of arthritis (joint disease). It does not cause inflammation or damage to joints, muscles or other tissues. However, because fibromyalgia can cause chronic pain and fatigue similar to arthritis, some people may think of it as a rheumatic condition. As a result, often a rheumatologist detects this disease (and rules out other rheumatic diseases). Your primary care physician can provide all the other care and treatment of fibromyalgia that you need. Additional Information  The [employer] has compiled this list to give you a starting point for your own additional research. The A[employer] does not endorse or maintain these Web sites, and is not responsible for any information or claims provided on them. It is always best to talk with your rheumatologist for more information and before making any decisions about your care. C/ [address] of Arth[employer] and Musculoskeletal and Skin Diseases   National Fibromyalgia Association   National Fibromyalgia and Chronic Pain [address] [facility].  Updated May 2015. Written by Domo Castillo MD, and reviewed by the [employer] Committee on Communications and Marketing. This information is provided for general education only. Individuals should consult a qualified health care provider for professional medical advice, diagnosis and treatment of a medical or health condition. © 2015 [employer].

## 2022-08-16 NOTE — ASSESSMENT & PLAN NOTE
- discussed the Dx, pathophysiology and management options of fibromyalgia w/ the pt in detail. Discussed that fibromyalgia is a non-life threatening and non-rheumatic disease. Discussed the various Tx options  including the medical management and PT/aquatic therapy as well as self exercises. Discussed various FDA approved (Cymbalta, Lyrica, Gabapentin, Savella) and non-FDA approved medications (muscle relaxants, SSRI's) w/ the pt. Provided detailed handout on fibromyalgia to the pt. - would avoid Gabapentinoids and muscle relaxants d/t chronic fatigue.  - she will switch Escitalopram to Duloxetine 60 mg daily.

## 2022-08-17 NOTE — RESULT ENCOUNTER NOTE
Inflammatory marker is mildly elevated, we discussed switching SSZ to MTX at her last visit but she wanted to hold off. LMK if she wants to try MTX again, otherwise will see her at next f/u visit.

## 2022-08-18 ENCOUNTER — TELEPHONE (OUTPATIENT)
Dept: RHEUMATOLOGY | Age: 27
End: 2022-08-18

## 2022-08-18 DIAGNOSIS — M05.79 RHEUMATOID ARTHRITIS INVOLVING MULTIPLE SITES WITH POSITIVE RHEUMATOID FACTOR (HCC): Primary | ICD-10-CM

## 2022-08-18 DIAGNOSIS — Z79.899 HIGH RISK MEDICATION USE: ICD-10-CM

## 2022-08-18 NOTE — TELEPHONE ENCOUNTER
Patient called the office with multiple questions and concerns. 1) she reports that she told the medical assistant yesterday to not send her pills to the mail order especially the new medication. She did not want to pay for a 90 day supply of a new medication when it may not work or make her ill. She is very upset because the pharmacy charged her $70.00. advised that I will let  know. 2) discussed her lab results. She advised that during her visit to the office, Rosalene Kanner felt that her joints were looking better on the medication. She doesn't understand why her inflammation labs would be worse if  thought she was improving. We discussed how sensitive the CRP testing is. Advised I would ask  to advise on her opinion on this question. 3) she reports that the sulfasalazine has been causing her nausea. She stopped the medication and her nausea has gotten better since. She advised that she told the physician during her visit. 4) Dr. Warden Nguyen stopped her Naprosyn thinking this was causing the nausea. She states this was not causing the nausea and is wondering if she can go back on the medication. She uses them for her migraines. 5) she is wondering if she should go on the methotrexate. I advised that Rosalene Kanner was letting her decide on this treatment. She feels she needs  strong opinion regarding this medication. She is concerned about having trouble with her stomach. 6) we reset her mychart. Sent her text to get signed up. She is supposed to let me know if she has trouble. If so, I will mail her copies of her lab results. 7) she reports that she is taking the Orencia still.

## 2022-08-20 RX ORDER — METHOTREXATE 25 MG/ML
12.5 INJECTION, SOLUTION INTRA-ARTERIAL; INTRAMUSCULAR; INTRAVENOUS WEEKLY
Qty: 6 ML | Refills: 0 | Status: SHIPPED | OUTPATIENT
Start: 2022-08-20 | End: 2022-11-18

## 2022-08-20 RX ORDER — IBUPROFEN 200 MG
1 TABLET ORAL WEEKLY
Qty: 10 EACH | Refills: 2 | Status: SHIPPED | OUTPATIENT
Start: 2022-08-20 | End: 2022-09-19

## 2022-08-20 RX ORDER — FOLIC ACID 1 MG/1
1 TABLET ORAL DAILY
Qty: 90 TABLET | Refills: 0 | Status: SHIPPED | OUTPATIENT
Start: 2022-08-20 | End: 2022-11-18

## 2022-08-20 RX ORDER — NAPROXEN 500 MG/1
500 TABLET ORAL 2 TIMES DAILY WITH MEALS
Qty: 180 TABLET | Refills: 0 | Status: SHIPPED | OUTPATIENT
Start: 2022-08-20 | End: 2022-11-18

## 2022-08-20 NOTE — TELEPHONE ENCOUNTER
Stop SSZ. Resume MTX, I wrote for subcutaneous MTX 12.5 mg wkly - this bypasses her GI tract and dose not cause any stomach issues. Needs to repeat labs 4 wks after starting MTX. Sanjana Casperia. Sent MTX and Naproxen refills to her [facility]. She had various pharmacy requests that needed to be modified at her last visit, once a medication is sent electronically, the pharmacy may fill it even if we cancel the order on our end. I did cancel the medication that she requested. I am happy to referral out to another Rheumatology group as she seems dissatisfied with her care.

## 2022-08-22 NOTE — TELEPHONE ENCOUNTER
Patient was informed of the below information by the . The pt was unsatisfied with speaking with Doreen Carcamo. She has advised that Rosalene Kanner has not addressed her concerns and questions. She was insisting Doreen Carcamo fit her in. Declined all appts available. She asked to speak with me or Ladi.       I spoke with pt. She was advised the info below. She again advised that her fatigue was not addressed and feels an office visit is better than the mychart and telephone communications to discuss her results. Offered available openings again. She asked for early. There was nothing open in early am. She took the dates and times that are available and will call the office back.

## 2022-08-22 NOTE — TELEPHONE ENCOUNTER
Patient called back. She will come on 8/30/22 at 11:40a to discuss all her concerns and questions regarding lab results. Complaining of fatigue.

## 2022-08-26 NOTE — PROGRESS NOTES
Nohelia Patrick MD  [facility] - Rheumatology    [x] [facility]:  [facility] [address] [facility] [facility] [] [facility]:  [address], [address]   Office: [phone]  Fax: [phone]     RHEUMATOLOGY PROGRESS NOTE    ASSESSMENT/PLAN:  Christine Nicole is a 23 y.o. female w/ seropositive RA (RF 38, negative CCP) and fibromyalgia. PMHx pertinent for IBS (per pt Dx by GI (Dr. Fan Easley) in 2018) and depression. Current rheum meds:  SSZ 1000 mg BID: started in 12/2021  Abatacept 125 mg SC wkly: started in 6/2022  Naproxen 500 mg BID PRN  Duloxetine 60 mg daily: has not started     Prior rheum meds:   mg daily: took from 3/19 to 7/19, caused diarrhea  MTX 10 mg PO wkly: took from 4/2019-9/2021, switched to Leflunomide d/t fatigue  Leflunomide 10 mg daily: took from 9/2021-12/2021, switched to SSZ d/t hair loss  Etanercept: took from 5/2020-8/2022, switched to Abatacept  Sarilumab: denied by insurance  Amitriptyline     1. Rheumatoid arthritis involving multiple sites with positive rheumatoid factor ([facility] [address]  Assessment & Plan:  - significantly improved since switching Etanercept to Abatacept. She had mild residual disease on exam. Discussed the clinical significance of her CRP. - she was unable to tolerate HCQ, Leflunomide, MTX and SSZ in the past. MTX was discontinued d/t chronic fatigue, however she did not notice any worsening of her fatigue after stopping MTX. As such she was prescribed MTX 12.5 mg SC wkly but she has not started this d/t fear of s/e. She agreed to try MTX 10 mg SC wkly - d/w pt that this should not cause any GI s/e or fatigue at this dose. - cont Abatacept 125 mg SC wkly. Discussed various types of biologic DMARDs. Discussed switching to COY-I if she cont to have active disease on Abatacept.   - Naproxen was discontinued at her last visit d/t dyspepsia, pt has been taking this for migraines - advised her to f/u w/ PCP regarding migraine Tx.  2. Fibromyalgia  Assessment & Plan:  - discussed w/ the pt's mother the diagnosis criteria and disease course of fibromyalgia. She had 18/18 tender points c/w secondary fibromyalgia on exam.  - she was instructed to switch Escitalopram to Duloxetine 60 mg daily at her last visit but has not started Duloxetine. - encouraged regular, aerobic exercise. 3. Tear of left acetabular labrum, subsequent encounter  Assessment & Plan:  - she saw Dr. Keyana Reeves on 4/21/21 for L femoral anteversion and anterosuperior labrum tear. She has been offered surgical correction - pt stated that this has been put on hold d/t her active RA. We discussed francisco javier-operative management of her DMARDs. 4. Chronic fatigue  Assessment & Plan:  - discussed the various causes of fatigue. Specifically, Laura Iqbal felt stopping MTX in the past did not improve her fatigue. Discussed that systemic inflammation from her RA can cause fatigue as can various other conditions including untreated sleep apnea, vitamin D deficiency, thyroid disorders, anxiety, fibromyalgia etc. I strongly recommended that she establishes care w/ a PCP that she trusts to work up her chronic fatigue. 5. Anxiety  Assessment & Plan:  - she has uncontrolled anxiety. She has been tearful during her recent Rheumatology encounters. I strongly recommended that she established care w/ a psychologist and psychiatrist if needed for her anxiety. - she was recently prescribed Duloxetine for her anxiety and fibromyalgia. 6. High risk medication use  Assessment & Plan:  - she will obtain labs 4 wks after starting MTX for toxicity monitoring. Return in about 2 months (around 10/30/2022) for lab result discussion and treatment plan, medication monitoring.     TIME SPENT TODAY:  I spent over 40 minutes of face-to-face time with the pt (including taking interval history and performing physical exam, review of medical records, independently interpreting results and communicating results to the pt/family/caregiver, counseling and educating the pt and her mother on her new Dx of fibromyalgia, her RA, anxiety and DMARD toxicity monitoring, implementation of treatment plan, coordination of care, documenting clinical information in the EMR) during today visit. At least 50% of this time was spent in counseling, explanation of diagnosis, planning of further management, and coordination of care. The risks and benefits of my recommendations, as well as other treatment options, benefits and side effects were discussed with the patient today. Questions were answered. NOTE: This report is transcribed by using voice recognition software dragon. Every effort is made to ensure accuracy; however, inadvertent computerized  transcription errors may be present. SUBJECTIVE:  Past medical/surgical history, medications and allergies are reviewed and updated as appropriate. Interval Hx:   Pt returns to the office today w/ her mother to discuss several issues. She has been very anxious. Mother states she has been tearful after each Rheumatology visit - her parents had to pick her up from the parking lot after her last visit. Pt feels upset about her RA, chronic L hip pain and chronic fatigue. She feels she is too young to be having these health issues. Pt and mother are wondering why her CRP is trending up. Pt reports compliance w/ Abatacept. We recommended switching SSZ to MTX SC d/t GI s/e from SSZ. She has not started MTX. She is wondering what other treatment options there are for RA. She feels fatigued all the time. Denies snoring. Mother is questioning about her new Dx of fibromyalgia. She is wondering if Elsa Winn is developing another autoimmune disease. She was prescribed Duloxetine at her last visit, she has not started this. Pt states she has not seen her PCP at  in over a yr.  She felt frustrated at her last visit when she was recommended to \"drink more caffeine for my fatigue\". ROS:  Constitutional: denies chronic fatigue, fever/chills, night sweats, unintentional weight loss  Integumentary: denies photosensitivity, rash, patchy alopecia, or Sx of Raynaud's phenomenon  Eyes: denies dry eyes, redness or pain, visual disturbance, or floaters  Nose: denies nasal ulcers or recurrent sinusitis  Oral cavity: denies dry mouth or oral ulcers  Cardiovascular: denies CP, palpitations, Hx of pericardial effusion or pericarditis  Respiratory: denies SOB, cough, hemoptysis, or pleurisy  Musculoskeletal:  refer to above HPI     Allergies   Allergen Reactions    [place] Itching and Swelling     Patient did allergy testing   Causes redness too        Past Medical History:        Diagnosis Date    Polyarthritis with positive rheumatoid factor ([facility] [address] 2/12/2019       Past Surgical History:        Procedure Laterality Date    INNER EAR SURGERY Left 2010    [facility]    WISDOM TOOTH EXTRACTION         Medications:    Current Outpatient Medications   Medication Sig Dispense Refill    folic acid (FOLVITE) 1 MG tablet Take 1 tablet by mouth daily 90 tablet 0    methotrexate Sodium 50 MG/2ML chemo injection Inject 0.5 mLs into the skin once a week 6 mL 0    INSULIN SYRINGE 1CC/29G (COMFORT ASSIST INSULIN SYRINGE) 29G X 1/2\" 1 ML MISC 1 each by Does not apply route once a week Use one syringe to inject weekly Methotrexate into skin. 10 each 2    naproxen (NAPROSYN) 500 MG tablet Take 1 tablet by mouth 2 times daily (with meals) 180 tablet 0    Abatacept (ORENCIA CLICKJECT) 678 MG/ML SOAJ Inject 1 pen into the skin once a week 12 each 0    DULoxetine (CYMBALTA) 60 MG extended release capsule Take 1 capsule by mouth in the morning.  90 capsule 0    amitriptyline (ELAVIL) 10 MG tablet       drospirenone-ethinyl estradiol (JOLENE) 3-0.02 MG per tablet Take 1 tablet by mouth daily      tretinoin (RETIN-A) 0.05 % cream       Handicap Placard MISC by Does not apply route Permanent. 1 each 0     No current facility-administered medications for this visit. 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, full fist formation w/ good  strength              Wrist: R wrist joint w/o synovitis, L wrist joint w/o swelling NTTP, FROM              Elbow: no synovitis or bursitis, FROM  Lower extremities:              Knees: no warmth or effusion present, FROM              Ankles: no synovitis, FROM   Feet: no toe swelling, toes w/ mild erythema  INTEGUMENT: no active rash, no skin thickening, no malar rash or psoriatic lesions, no petechiae, bruises, or palpable purpura, no patchy alopecia, no nail or periungual changes, no clubbing or digital ulcers    DATA:  Labs:   I personally reviewed interval labs and discussed w/ the pt in detail which showed:    Lab Results   Component Value Date    WBC 6.5 08/16/2022    HGB 14.4 08/16/2022    HCT 40.7 08/16/2022    MCV 91.4 08/16/2022     08/16/2022    LYMPHOPCT 46.1 08/16/2022    RBC 4.45 08/16/2022    MCH 32.3 08/16/2022    MCHC 35.4 08/16/2022    RDW 12.2 (L) 08/16/2022     Lab Results   Component Value Date     02/21/2019    K 3.7 02/21/2019     02/21/2019    CO2 23 02/21/2019    BUN 10 02/21/2019    CREATININE 0.7 08/16/2022    GLUCOSE 88 02/21/2019    CALCIUM 9.4 02/21/2019    PROT 7.3 08/16/2022    LABALBU 4.6 08/16/2022    BILITOT <0.2 08/16/2022    ALKPHOS 64 08/16/2022    AST 16 08/16/2022    ALT 10 08/16/2022    LABGLOM >60 08/16/2022    GFRAA >60 08/16/2022    AGRATIO 1.5 02/21/2019    GLOB 3.0 02/21/2019     Lab Results   Component Value Date    VITD25 45.8 08/16/2022      Lab Results   Component Value Date    C3 118.4 02/21/2019     Lab Results   Component Value Date    C4 12.8 02/21/2019     No results found for: [facility]     Lab Results   Component Value Date    LABURIC 2.5 (L) 02/08/2019     Lab Results   Component Value Date    CRP 9.9 (H) 08/16/2022    CRP 7.6 (H) 02/02/2022    CRP 4.4 07/24/2020    CRP 2.9 12/18/2019     Lab Results   Component Value Date    SEDRATE 7 12/18/2019    SEDRATE 6 02/08/2019     VECTRA DA (9/30/20): 38 (moderate)  VECTRA DA (5/1/21): 32 (moderate)    No results found for: CKTOTAL    RF 38.0, negative CCP (2/21/19)  Negative HLA B27 (2/21/19)  Negative DORCAS (2/21/19)  Negative SSA, SSB, scl-70 (3/14/19)  Negative LA, B2GP-1 IgM and IgG, aCL IgM and IgG (2/21/19)  Negative ANCA by IFA, MPO, PR3 (3/14/19)  Negative cryoglobulin (2/21/19)  Negative hepatitis B and C serologies (2/21/19)  Negative QuantiFERON (2/2/22)    Imaging:  I personally reviewed interval imaging and discussed w/ the pt in detail which included:    X-rays (2/21/19):  R hand: Bone mineralization is normal.  No fracture or dislocation. Joint alignment and joint spaces are maintained. No erosions are present. L hand: Bone mineralization is normal.  No fracture or dislocation. Joint alignment and joint spaces are maintained. No erosions are present. R foot: Bone mineralization is normal.  No fracture or dislocation. Joint alignment and joint spaces are maintained. No erosions are present. L foot: Bone mineralization is normal.  No fracture or dislocation. Joint alignment and joint spaces are maintained. No erosions are present. B/l SI joints: No fracture or dislocation. Sacral arcuate lines are intact.  Evaluation is somewhat limited by overlying stool and bowel gas, however there is no evidence of ankylosis. Joint spaces appear maintained and no erosions are present. B/l hips appear well maintained and normal in position. MRI hands (3/14/19):  R hand:  No evidence of RA in this nonenhanced exam.  L hand:  Incidental mild tendinosis of the flexor pollicis longus. No evidence of RA in this nonenhanced MRI of the hand. MRI b/l feet w/o contrast (3/4/19): Unremarkable study. Procedures:  EGD w/ Bx(8/2/18): Normal findings of the stomach, duodenal bulb and the duodenum. Duodenal Bx:  Duodenal mucosa w/o significant histopathologic changes. Stomach Bx:  Mild chronic inactive gastritis, negative for Helicobacter-like organisms. Above results were discussed w/ the pt in detail during today's visit.

## 2022-08-30 ENCOUNTER — OFFICE VISIT (OUTPATIENT)
Dept: RHEUMATOLOGY | Age: 27
End: 2022-08-30
Payer: COMMERCIAL

## 2022-08-30 VITALS — BODY MASS INDEX: 22.02 KG/M2 | HEIGHT: 64 IN | WEIGHT: 129 LBS

## 2022-08-30 DIAGNOSIS — F41.9 ANXIETY: ICD-10-CM

## 2022-08-30 DIAGNOSIS — M79.7 FIBROMYALGIA: ICD-10-CM

## 2022-08-30 DIAGNOSIS — R53.82 CHRONIC FATIGUE: ICD-10-CM

## 2022-08-30 DIAGNOSIS — Z79.899 HIGH RISK MEDICATION USE: ICD-10-CM

## 2022-08-30 DIAGNOSIS — S73.192D TEAR OF LEFT ACETABULAR LABRUM, SUBSEQUENT ENCOUNTER: ICD-10-CM

## 2022-08-30 DIAGNOSIS — M05.79 RHEUMATOID ARTHRITIS INVOLVING MULTIPLE SITES WITH POSITIVE RHEUMATOID FACTOR (HCC): Primary | ICD-10-CM

## 2022-08-30 PROCEDURE — 99215 OFFICE O/P EST HI 40 MIN: CPT | Performed by: INTERNAL MEDICINE

## 2022-08-30 NOTE — ASSESSMENT & PLAN NOTE
- discussed the various causes of fatigue. Specifically, Gregorio Tony felt stopping MTX in the past did not improve her fatigue. Discussed that systemic inflammation from her RA can cause fatigue as can various other conditions including untreated sleep apnea, vitamin D deficiency, thyroid disorders, anxiety, fibromyalgia etc. I strongly recommended that she establishes care w/ a PCP that she trusts to work up her chronic fatigue.

## 2022-08-30 NOTE — ASSESSMENT & PLAN NOTE
- she saw Dr. Gill Duvall on 4/21/21 for L femoral anteversion and anterosuperior labrum tear. She has been offered surgical correction - pt stated that this has been put on hold d/t her active RA. We discussed francisco javier-operative management of her DMARDs.

## 2022-08-30 NOTE — ASSESSMENT & PLAN NOTE
- discussed w/ the pt's mother the diagnosis criteria and disease course of fibromyalgia. She had 18/18 tender points c/w secondary fibromyalgia on exam.  - she was instructed to switch Escitalopram to Duloxetine 60 mg daily at her last visit but has not started Duloxetine. - encouraged regular, aerobic exercise.

## 2022-08-30 NOTE — ASSESSMENT & PLAN NOTE
- significantly improved since switching Etanercept to Abatacept. She had mild residual disease on exam. Discussed the clinical significance of her CRP. - she was unable to tolerate HCQ, Leflunomide, MTX and SSZ in the past. MTX was discontinued d/t chronic fatigue, however she did not notice any worsening of her fatigue after stopping MTX. As such she was prescribed MTX 12.5 mg SC wkly but she has not started this d/t fear of s/e. She agreed to try MTX 10 mg SC wkly - d/w pt that this should not cause any GI s/e or fatigue at this dose. - cont Abatacept 125 mg SC wkly. Discussed various types of biologic DMARDs. Discussed switching to COY-I if she cont to have active disease on Abatacept. - Naproxen was discontinued at her last visit d/t dyspepsia, pt has been taking this for migraines - advised her to f/u w/ PCP regarding migraine Tx.

## 2022-08-30 NOTE — ASSESSMENT & PLAN NOTE
- she has uncontrolled anxiety. She has been tearful during her recent Rheumatology encounters. I strongly recommended that she established care w/ a psychologist and psychiatrist if needed for her anxiety. - she was recently prescribed Duloxetine for her anxiety and fibromyalgia.

## 2022-10-22 DIAGNOSIS — F32.A CHRONIC DEPRESSION: ICD-10-CM

## 2022-10-22 DIAGNOSIS — M79.7 FIBROMYALGIA: ICD-10-CM

## 2022-10-22 DIAGNOSIS — M05.79 RHEUMATOID ARTHRITIS INVOLVING MULTIPLE SITES WITH POSITIVE RHEUMATOID FACTOR (HCC): ICD-10-CM

## 2022-10-24 RX ORDER — DULOXETIN HYDROCHLORIDE 60 MG/1
60 CAPSULE, DELAYED RELEASE ORAL DAILY
Qty: 90 CAPSULE | Refills: 3 | OUTPATIENT
Start: 2022-10-24 | End: 2023-01-22

## 2022-11-09 NOTE — TELEPHONE ENCOUNTER
Addended by: HONG DOBBINS on: 11/9/2022 07:55 AM     Modules accepted: Orders     Called spoke with patient on PA for [place]    Patient is aware I faxed over an Appeal to [employer].   I will contact her on next step-patient states understanding

## 2022-11-11 ENCOUNTER — OFFICE VISIT (OUTPATIENT)
Dept: FAMILY MEDICINE CLINIC | Age: 27
End: 2022-11-11
Payer: COMMERCIAL

## 2022-11-11 VITALS
HEIGHT: 64 IN | WEIGHT: 132 LBS | DIASTOLIC BLOOD PRESSURE: 80 MMHG | SYSTOLIC BLOOD PRESSURE: 130 MMHG | HEART RATE: 101 BPM | BODY MASS INDEX: 22.53 KG/M2 | OXYGEN SATURATION: 98 %

## 2022-11-11 DIAGNOSIS — R53.82 CHRONIC FATIGUE: ICD-10-CM

## 2022-11-11 DIAGNOSIS — M05.79 RHEUMATOID ARTHRITIS INVOLVING MULTIPLE SITES WITH POSITIVE RHEUMATOID FACTOR (HCC): Primary | ICD-10-CM

## 2022-11-11 DIAGNOSIS — M05.79 RHEUMATOID ARTHRITIS INVOLVING MULTIPLE SITES WITH POSITIVE RHEUMATOID FACTOR (HCC): ICD-10-CM

## 2022-11-11 DIAGNOSIS — S73.192D TEAR OF LEFT ACETABULAR LABRUM, SUBSEQUENT ENCOUNTER: ICD-10-CM

## 2022-11-11 DIAGNOSIS — G89.4 CHRONIC PAIN SYNDROME: ICD-10-CM

## 2022-11-11 DIAGNOSIS — K58.1 IRRITABLE BOWEL SYNDROME WITH CONSTIPATION: ICD-10-CM

## 2022-11-11 DIAGNOSIS — Z79.899 HIGH RISK MEDICATION USE: ICD-10-CM

## 2022-11-11 DIAGNOSIS — K21.9 GASTROESOPHAGEAL REFLUX DISEASE WITHOUT ESOPHAGITIS: ICD-10-CM

## 2022-11-11 DIAGNOSIS — M25.552 LEFT HIP PAIN: ICD-10-CM

## 2022-11-11 DIAGNOSIS — F41.9 ANXIETY: ICD-10-CM

## 2022-11-11 DIAGNOSIS — F33.1 MODERATE EPISODE OF RECURRENT MAJOR DEPRESSIVE DISORDER (HCC): ICD-10-CM

## 2022-11-11 DIAGNOSIS — M79.7 FIBROMYALGIA: ICD-10-CM

## 2022-11-11 PROBLEM — H69.03 PATULOUS EUSTACHIAN TUBE OF BOTH EARS: Status: ACTIVE | Noted: 2022-02-11

## 2022-11-11 PROBLEM — M79.675 TOE PAIN, BILATERAL: Status: RESOLVED | Noted: 2019-02-08 | Resolved: 2022-11-11

## 2022-11-11 PROBLEM — R21 RASH: Status: RESOLVED | Noted: 2022-02-02 | Resolved: 2022-11-11

## 2022-11-11 PROBLEM — M79.674 TOE PAIN, BILATERAL: Status: RESOLVED | Noted: 2019-02-08 | Resolved: 2022-11-11

## 2022-11-11 PROBLEM — N94.6 DYSMENORRHEA: Status: ACTIVE | Noted: 2017-09-11

## 2022-11-11 PROBLEM — M79.89 TOE SWELLING: Status: RESOLVED | Noted: 2019-02-08 | Resolved: 2022-11-11

## 2022-11-11 PROBLEM — R63.4 ABNORMAL WEIGHT LOSS: Status: RESOLVED | Noted: 2018-06-26 | Resolved: 2022-11-11

## 2022-11-11 LAB
A/G RATIO: 1.8 (ref 1.1–2.2)
ANION GAP SERPL CALCULATED.3IONS-SCNC: 13 MMOL/L (ref 3–16)
BASOPHILS ABSOLUTE: 0 K/UL (ref 0–0.2)
BASOPHILS RELATIVE PERCENT: 0.7 %
BUN BLDV-MCNC: 11 MG/DL (ref 7–20)
CALCIUM SERPL-MCNC: 9.7 MG/DL (ref 8.3–10.6)
CHLORIDE BLD-SCNC: 102 MMOL/L (ref 99–110)
CO2: 25 MMOL/L (ref 21–32)
CREAT SERPL-MCNC: 0.7 MG/DL (ref 0.6–1.1)
EOSINOPHILS ABSOLUTE: 0.1 K/UL (ref 0–0.6)
EOSINOPHILS RELATIVE PERCENT: 2.1 %
GFR SERPL CREATININE-BSD FRML MDRD: >60 ML/MIN/{1.73_M2}
GLUCOSE BLD-MCNC: 81 MG/DL (ref 70–99)
HCT VFR BLD CALC: 41.6 % (ref 36–48)
HEMOGLOBIN: 14.3 G/DL (ref 12–16)
LYMPHOCYTES ABSOLUTE: 2.9 K/UL (ref 1–5.1)
LYMPHOCYTES RELATIVE PERCENT: 48.4 %
MCH RBC QN AUTO: 31.1 PG (ref 26–34)
MCHC RBC AUTO-ENTMCNC: 34.3 G/DL (ref 31–36)
MCV RBC AUTO: 90.7 FL (ref 80–100)
MONOCYTES ABSOLUTE: 0.4 K/UL (ref 0–1.3)
MONOCYTES RELATIVE PERCENT: 6.8 %
NEUTROPHILS ABSOLUTE: 2.5 K/UL (ref 1.7–7.7)
NEUTROPHILS RELATIVE PERCENT: 42 %
PDW BLD-RTO: 12.2 % (ref 12.4–15.4)
PLATELET # BLD: 195 K/UL (ref 135–450)
PMV BLD AUTO: 7.4 FL (ref 5–10.5)
POTASSIUM SERPL-SCNC: 4 MMOL/L (ref 3.5–5.1)
RBC # BLD: 4.58 M/UL (ref 4–5.2)
SODIUM BLD-SCNC: 140 MMOL/L (ref 136–145)
TSH SERPL DL<=0.05 MIU/L-ACNC: 0.95 UIU/ML (ref 0.27–4.2)
VITAMIN B-12: 797 PG/ML (ref 211–911)
VITAMIN D 25-HYDROXY: 39.8 NG/ML
WBC # BLD: 6 K/UL (ref 4–11)

## 2022-11-11 PROCEDURE — 99204 OFFICE O/P NEW MOD 45 MIN: CPT | Performed by: FAMILY MEDICINE

## 2022-11-11 RX ORDER — HYDROXYZINE HYDROCHLORIDE 25 MG/1
25 TABLET, FILM COATED ORAL EVERY 8 HOURS PRN
Qty: 30 TABLET | Refills: 2 | Status: SHIPPED | OUTPATIENT
Start: 2022-11-11 | End: 2022-11-21

## 2022-11-11 RX ORDER — TRETINOIN 0.5 MG/G
CREAM TOPICAL NIGHTLY
Qty: 45 G | Refills: 3 | Status: SHIPPED | OUTPATIENT
Start: 2022-11-11

## 2022-11-11 SDOH — ECONOMIC STABILITY: FOOD INSECURITY: WITHIN THE PAST 12 MONTHS, THE FOOD YOU BOUGHT JUST DIDN'T LAST AND YOU DIDN'T HAVE MONEY TO GET MORE.: NEVER TRUE

## 2022-11-11 SDOH — ECONOMIC STABILITY: TRANSPORTATION INSECURITY
IN THE PAST 12 MONTHS, HAS THE LACK OF TRANSPORTATION KEPT YOU FROM MEDICAL APPOINTMENTS OR FROM GETTING MEDICATIONS?: NO

## 2022-11-11 SDOH — ECONOMIC STABILITY: TRANSPORTATION INSECURITY
IN THE PAST 12 MONTHS, HAS LACK OF TRANSPORTATION KEPT YOU FROM MEETINGS, WORK, OR FROM GETTING THINGS NEEDED FOR DAILY LIVING?: NO

## 2022-11-11 SDOH — ECONOMIC STABILITY: FOOD INSECURITY: WITHIN THE PAST 12 MONTHS, YOU WORRIED THAT YOUR FOOD WOULD RUN OUT BEFORE YOU GOT MONEY TO BUY MORE.: NEVER TRUE

## 2022-11-11 ASSESSMENT — PATIENT HEALTH QUESTIONNAIRE - PHQ9
8. MOVING OR SPEAKING SO SLOWLY THAT OTHER PEOPLE COULD HAVE NOTICED. OR THE OPPOSITE, BEING SO FIGETY OR RESTLESS THAT YOU HAVE BEEN MOVING AROUND A LOT MORE THAN USUAL: 0
3. TROUBLE FALLING OR STAYING ASLEEP: 0
SUM OF ALL RESPONSES TO PHQ QUESTIONS 1-9: 0
5. POOR APPETITE OR OVEREATING: 0
7. TROUBLE CONCENTRATING ON THINGS, SUCH AS READING THE NEWSPAPER OR WATCHING TELEVISION: 0
9. THOUGHTS THAT YOU WOULD BE BETTER OFF DEAD, OR OF HURTING YOURSELF: 0
SUM OF ALL RESPONSES TO PHQ QUESTIONS 1-9: 0
2. FEELING DOWN, DEPRESSED OR HOPELESS: 0
SUM OF ALL RESPONSES TO PHQ QUESTIONS 1-9: 0
6. FEELING BAD ABOUT YOURSELF - OR THAT YOU ARE A FAILURE OR HAVE LET YOURSELF OR YOUR FAMILY DOWN: 0
SUM OF ALL RESPONSES TO PHQ QUESTIONS 1-9: 0
4. FEELING TIRED OR HAVING LITTLE ENERGY: 0
10. IF YOU CHECKED OFF ANY PROBLEMS, HOW DIFFICULT HAVE THESE PROBLEMS MADE IT FOR YOU TO DO YOUR WORK, TAKE CARE OF THINGS AT HOME, OR GET ALONG WITH OTHER PEOPLE: 0

## 2022-11-11 ASSESSMENT — SOCIAL DETERMINANTS OF HEALTH (SDOH): HOW HARD IS IT FOR YOU TO PAY FOR THE VERY BASICS LIKE FOOD, HOUSING, MEDICAL CARE, AND HEATING?: NOT HARD AT ALL

## 2022-11-11 NOTE — PROGRESS NOTES
Marylene Fallen   YOB: 1995    Date of Visit:  11/11/2022        Allergies   Allergen Reactions    [place] Itching and Swelling     Patient did allergy testing   Causes redness too      Outpatient Medications Marked as Taking for the 11/11/22 encounter (Office Visit) with Lenon Landau, MD   Medication Sig Dispense Refill    tretinoin (RETIN-A) 0.05 % cream Apply topically nightly 45 g 3    diclofenac sodium (VOLTAREN) 1 % GEL Apply 2 g topically 4 times daily as needed for Pain 946 g 3    folic acid (FOLVITE) 1 MG tablet Take 1 tablet by mouth daily 90 tablet 0    methotrexate Sodium 50 MG/2ML chemo injection Inject 0.5 mLs into the skin once a week 6 mL 0    naproxen (NAPROSYN) 500 MG tablet Take 1 tablet by mouth 2 times daily (with meals) 180 tablet 0    Abatacept (ORENCIA CLICKJECT) 612 MG/ML SOAJ Inject 1 pen into the skin once a week 12 each 0    DULoxetine (CYMBALTA) 60 MG extended release capsule Take 1 capsule by mouth in the morning. 90 capsule 0    amitriptyline (ELAVIL) 10 MG tablet       drospirenone-ethinyl estradiol (JOLENE) 3-0.02 MG per tablet Take 1 tablet by mouth daily      Handicap Placard MISC by Does not apply route Permanent. 1 each 0         Vitals:    11/11/22 0921   BP: 130/80   Site: Right Upper Arm   Position: Sitting   Cuff Size: Medium Adult   Pulse: (!) 101   SpO2: 98%   Weight: 132 lb (59.9 kg)   Height: 5' 4\" (1.626 m)     Body mass index is 22.66 kg/m². Wt Readings from Last 3 Encounters:   11/11/22 132 lb (59.9 kg)   08/30/22 129 lb (58.5 kg)   08/16/22 120 lb (54.4 kg)     BP Readings from Last 3 Encounters:   11/11/22 130/80   04/06/22 124/80   02/02/22 118/72        Chief Complaint   Patient presents with    New Patient     With concerns        HPI    Radha Glover presents to establish care. she has the following concerns today:    Chronic fatigue: The patient is seeing rheumatology. Going on for years. It has not improved. Sleeps very well at night.   Can sleep and sleep but is still tired the next day. Seropositive rheumatoid arthritis: The patient is seeing rheumatology - Dr. Charlee Cantrell. Stable on her current medications. Dx at age 22 after evaluation for joint pain. L hip pain/hip dysplasia/torn labrum:  hx of seeing orthopaedic surgery at [facility] and a specialist in [place] and rheumatology. Has done injections and PT. Fibromyalgia: The patient is seeing rheumatology. Has been given cymbalta which she started 2022. It has helped with her pain but makes her constipated. Constipation: Has a BM once every 2-3 days. Trying fiber daily. Tries to drink a lot of water. Has a patch less eustachian tube bilateral: S/p R tympanoplasty as was worse. Causes discomfort. Reports there is not much that can be done. Gets a patch put on L side every 2 months- $350 dollars each time which helps. The patient sees ENT at [facility] regularly. IBS: Hx of seeing GI. Used to have diarrhea but now has constipation. Depression and anxiety: Improved with cymbalta. Acne: uses retina for acne and cosmetic reasons. Abnormal pap smear: seeing GYN and will go back for more test.     Dysmenorrhea: on Serena per her GYN. SH:  Lives with her parents. Works at [employer] . Not currently sexually active.        Past Medical History:   Diagnosis Date    Polyarthritis with positive rheumatoid factor ([facility] [address] 2019       Past Surgical History:   Procedure Laterality Date    INNER EAR SURGERY Left     [facility]    INNER EAR SURGERY Right     WISDOM TOOTH EXTRACTION         Social History     Socioeconomic History    Marital status: Single     Spouse name: Not on file    Number of children: Not on file    Years of education: Not on file    Highest education level: Not on file   Occupational History    Not on file   Tobacco Use    Smoking status: Never    Smokeless tobacco: Never   Vaping Use    Vaping Use: Never used   Substance and Sexual Activity    Alcohol use: Yes     Alcohol/week: 3.0 standard drinks     Types: 3 Glasses of wine per week     Comment: socially    Drug use: No    Sexual activity: Not Currently     Partners: Male     Birth control/protection: Pill   Other Topics Concern    Not on file   Social History Narrative    Not on file     Social Determinants of Health     Financial Resource Strain: Low Risk     Difficulty of Paying Living Expenses: Not hard at all   Food Insecurity: No Food Insecurity    Worried About [address] in the Last Year: Never true    [address] [place] in the Last Year: Never true   Transportation Needs: No Transportation Needs    Lack of Transportation (Medical): No    Lack of Transportation (Non-Medical): No   Physical Activity: Not on file   Stress: Not on file   Social Connections: Not on file   Intimate Partner Violence: Not on file   Housing Stability: Not on file       Family History   Problem Relation Age of Onset    Rheum Arthritis Mother     No Known Problems Father     No Known Problems Sister     Breast Cancer Paternal Grandmother          Review of Systems  Complete review of systems negative except as documented in the HPI. Physical Exam  Constitutional:       General: She is not in acute distress. Appearance: She is well-developed. HENT:      Head: Normocephalic and atraumatic. Right Ear: Ear canal and external ear normal. There is no impacted cerumen. Tympanic membrane is not injected or bulging. Left Ear: Ear canal and external ear normal. There is no impacted cerumen. Tympanic membrane is not injected or bulging. Nose: Nose normal.      Mouth/Throat:      Mouth: Mucous membranes are moist.      Pharynx: No oropharyngeal exudate or posterior oropharyngeal erythema. Eyes:      General: Lids are normal.         Right eye: No discharge. Left eye: No discharge. Extraocular Movements: Extraocular movements intact.       Conjunctiva/sclera: Conjunctivae normal. Pupils: Pupils are equal, round, and reactive to light. Neck:      Thyroid: No thyromegaly. Trachea: No tracheal deviation. Cardiovascular:      Rate and Rhythm: Normal rate and regular rhythm. Heart sounds: Normal heart sounds. No murmur heard. Pulmonary:      Effort: Pulmonary effort is normal. No respiratory distress. Breath sounds: Normal breath sounds. Abdominal:      General: Bowel sounds are normal. There is no distension. Palpations: Abdomen is soft. Tenderness: There is no abdominal tenderness. Musculoskeletal:         General: No swelling. Cervical back: Normal range of motion and neck supple. Comments: Normal gait, normal muscle tone   Lymphadenopathy:      Cervical: No cervical adenopathy. Skin:     General: Skin is warm and dry. Findings: No rash. Neurological:      General: No focal deficit present. Mental Status: She is alert and oriented to person, place, and time. Mental status is at baseline. Cranial Nerves: No cranial nerve deficit. Psychiatric:         Mood and Affect: Mood normal.         Behavior: Behavior normal.         Thought Content: Thought content normal.         Judgment: Judgment normal.         Assessment/Plan     1. Rheumatoid arthritis involving multiple sites with positive rheumatoid factor (HCC)  Ongoing pain symptoms. Discussed options. Referral to pain management. She will continue to see rheum. She is considering getting a second opinion.   - Yael Zambrano MD, Pain Management, [facility]    2. Fibromyalgia  Distant symptoms continue the Cymbalta. Continue to see a rheumatologist and also given referral to pain management. - Yael Zambrano MD, Pain Management, [facility]    3. Tear of left acetabular labrum, subsequent encounter  Persistent. Patient plans to schedule follow-up appointment with her orthopedist in [place]. 4. Left hip pain  Persistent.   Patient plans to schedule follow-up appointment with her orthopedist in [place]. 5. Chronic fatigue  Persistent. Labs and referral to sleep medicine. - Michelle Boothe MD, Sleep Medicine, [place]  - Comprehensive Metabolic Panel; Future  - TSH; Future  - Vitamin D 25 Hydroxy; Future  - Methylmalonic Acid, Serum; Future  - Vitamin B12; Future    6. Gastroesophageal reflux disease without esophagitis  Stable. Continue current regimen. Takes a ppi prn.     7. Anxiety  Persistent. Continue the Cymbalta. Can tried hydroxyzine as needed. Schedule follow-up with psychologist Obey Bliss. 8. Irritable bowel syndrome with constipation  Persistent. Can try Colace. Counseled on hydration and fiber intake. 9. Moderate episode of recurrent major depressive disorder (HCC)  Stable but persistent. Continue the Cymbalta and schedule follow-up with psychologist Obey Bliss. 10. Chronic pain syndrome  Persistent. Pain management referral.   - Claudell Cookey, MD, Pain Management, [place]    Discussed medications with patient, who voiced understanding of their use and indications. All questions answered. Return in about 6 months (around 5/11/2023) for Chronic conditions. Documentation was done using voice recognition dragon software. Efforts were made to ensure accuracy; however, inadvertent, unintentional computerized transcription errors may be present. --Lupillo Roach MD on 11/11/2022    An electronic signature was used to authenticate this note.

## 2022-11-12 LAB
ALBUMIN SERPL-MCNC: 4.4 G/DL (ref 3.4–5)
ALP BLD-CCNC: 61 U/L (ref 40–129)
ALT SERPL-CCNC: 14 U/L (ref 10–40)
AST SERPL-CCNC: 16 U/L (ref 15–37)
BILIRUB SERPL-MCNC: 0.4 MG/DL (ref 0–1)
BILIRUBIN DIRECT: <0.2 MG/DL (ref 0–0.3)
BILIRUBIN, INDIRECT: NORMAL MG/DL (ref 0–1)
C-REACTIVE PROTEIN: 10.6 MG/L (ref 0–5.1)
TOTAL PROTEIN: 6.9 G/DL (ref 6.4–8.2)

## 2022-11-14 ENCOUNTER — TELEPHONE (OUTPATIENT)
Dept: FAMILY MEDICINE CLINIC | Age: 27
End: 2022-11-14

## 2022-11-14 NOTE — TELEPHONE ENCOUNTER
A  scanned  request for a  Prior Authorization for medication is attached to this encounter. Please initiate  this request with the patients insurance company.  Thank  You             Tretinoin 0.05%

## 2022-11-15 ENCOUNTER — TELEPHONE (OUTPATIENT)
Dept: FAMILY MEDICINE CLINIC | Age: 27
End: 2022-11-15

## 2022-11-15 DIAGNOSIS — M05.79 RHEUMATOID ARTHRITIS INVOLVING MULTIPLE SITES WITH POSITIVE RHEUMATOID FACTOR (HCC): Primary | ICD-10-CM

## 2022-11-17 LAB — METHYLMALONIC ACID: 0.14 UMOL/L (ref 0–0.4)

## 2022-11-21 ENCOUNTER — TELEPHONE (OUTPATIENT)
Dept: RHEUMATOLOGY | Age: 27
End: 2022-11-21

## 2022-11-21 NOTE — TELEPHONE ENCOUNTER
Pt called asking if she can get a appt at the [facility] location at 8:00am or 8:20am  because she has no flexibility with her job and the slots that's open does not work for her.

## 2022-11-29 ENCOUNTER — TELEPHONE (OUTPATIENT)
Dept: RHEUMATOLOGY | Age: 27
End: 2022-11-29

## 2022-11-29 NOTE — TELEPHONE ENCOUNTER
Patient called today regarding need for an appt. She advised that she needs something early in order for this to not effect her job. Advised pt that we will need to watch for a cancellation as the 8 am slot is usually new patient slots. She advised she can do late as well. Advised patient that the latest is 3 pm. She is willing to do 3 or 3:20 pm. Asked if she can do a VV during her lunch break. She feels she needs to be physically examined and prefer to be seen. She was tearful during the call. She reports that if is frustrating that she is in so much pain and cannot be seen. Offered available appt's and she cannot do those choices. We spoke about her maybe looking into a second opinion. She feels  is running out of treatment ideas for her and the pain she is in.

## 2022-11-30 ENCOUNTER — TELEPHONE (OUTPATIENT)
Dept: RHEUMATOLOGY | Age: 27
End: 2022-11-30

## 2022-11-30 NOTE — TELEPHONE ENCOUNTER
Patient called F[place]io [place] back regarding appt at 3:20p tomorrow at [facility]. She cannot come tomorrow. She works at [employer] and the first of the month is too busy for her to leave. She can do any other day at 3:20p, just not tomorrow.

## 2022-12-08 ENCOUNTER — OFFICE VISIT (OUTPATIENT)
Dept: PSYCHOLOGY | Age: 27
End: 2022-12-08

## 2022-12-08 DIAGNOSIS — F43.20 ADJUSTMENT DISORDER, UNSPECIFIED TYPE: Primary | ICD-10-CM

## 2022-12-08 ASSESSMENT — PATIENT HEALTH QUESTIONNAIRE - PHQ9
SUM OF ALL RESPONSES TO PHQ QUESTIONS 1-9: 8
6. FEELING BAD ABOUT YOURSELF - OR THAT YOU ARE A FAILURE OR HAVE LET YOURSELF OR YOUR FAMILY DOWN: 0
SUM OF ALL RESPONSES TO PHQ QUESTIONS 1-9: 8
7. TROUBLE CONCENTRATING ON THINGS, SUCH AS READING THE NEWSPAPER OR WATCHING TELEVISION: 0
1. LITTLE INTEREST OR PLEASURE IN DOING THINGS: 1
10. IF YOU CHECKED OFF ANY PROBLEMS, HOW DIFFICULT HAVE THESE PROBLEMS MADE IT FOR YOU TO DO YOUR WORK, TAKE CARE OF THINGS AT HOME, OR GET ALONG WITH OTHER PEOPLE: 1
9. THOUGHTS THAT YOU WOULD BE BETTER OFF DEAD, OR OF HURTING YOURSELF: 0
5. POOR APPETITE OR OVEREATING: 0
4. FEELING TIRED OR HAVING LITTLE ENERGY: 3
SUM OF ALL RESPONSES TO PHQ QUESTIONS 1-9: 8
2. FEELING DOWN, DEPRESSED OR HOPELESS: 2
SUM OF ALL RESPONSES TO PHQ QUESTIONS 1-9: 8
8. MOVING OR SPEAKING SO SLOWLY THAT OTHER PEOPLE COULD HAVE NOTICED. OR THE OPPOSITE, BEING SO FIGETY OR RESTLESS THAT YOU HAVE BEEN MOVING AROUND A LOT MORE THAN USUAL: 0
SUM OF ALL RESPONSES TO PHQ9 QUESTIONS 1 & 2: 3
3. TROUBLE FALLING OR STAYING ASLEEP: 2

## 2022-12-08 ASSESSMENT — ANXIETY QUESTIONNAIRES
3. WORRYING TOO MUCH ABOUT DIFFERENT THINGS: 1
6. BECOMING EASILY ANNOYED OR IRRITABLE: 1
4. TROUBLE RELAXING: 0
1. FEELING NERVOUS, ANXIOUS, OR ON EDGE: 1
2. NOT BEING ABLE TO STOP OR CONTROL WORRYING: 1
7. FEELING AFRAID AS IF SOMETHING AWFUL MIGHT HAPPEN: 0
5. BEING SO RESTLESS THAT IT IS HARD TO SIT STILL: 0
GAD7 TOTAL SCORE: 4
IF YOU CHECKED OFF ANY PROBLEMS ON THIS QUESTIONNAIRE, HOW DIFFICULT HAVE THESE PROBLEMS MADE IT FOR YOU TO DO YOUR WORK, TAKE CARE OF THINGS AT HOME, OR GET ALONG WITH OTHER PEOPLE: SOMEWHAT DIFFICULT

## 2022-12-08 NOTE — PROGRESS NOTES
Behavioral Health Consultation  [facility], B.S. Psychology Assistant  Jaye Lozano, Ph.D.  Psychology Supervisor  12/8/2022  10:47 AM EST      Time spent with Patient: 30 minutes  This is patient's first [facility] appointment. Reason for Consult:    Chief Complaint   Patient presents with    Other       Referring Provider: No referring provider defined for this encounter. Pt provided informed consent for the behavioral health program. Discussed with patient model of service to include the limits of confidentiality (i.e. abuse reporting, suicide intervention, etc.) and short-term intervention focused approach. Pt indicated understanding. Feedback given to PCP. S:    Pt seen for intake and reported sxs consistent with Adjustment Disorder. Pt specifically endorsed stress related to managing her chronic pain and medical conditions and feels that medical professionals are not taking her concerns seriously. Pt experiences joint pain d/t rheumatoid arthritis and has been struggling to get answers or treatments for her chronic fatigue. She endorsed some sx of low mood and hopelessness during flare-ups and occasional anhedonia. Mitigating factors include spending time with friends, coworkers, and family. 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 Pt identified goals to manage stress related to her chronic conditions and develop strategies to self-advocate. Focused intervention on psychoed re: acceptance and commitment therapy for chronic pain, and valued living. Pt will identify values before next visit.     O:  MSE:    Appearance: good hygiene   Attitude: cooperative, friendly, and tearful  Consciousness: alert  Orientation: oriented to person, place, time, general circumstance  Memory: recent and remote memory intact  Attention/Concentration: intact during session  Psychomotor Activity:normal  Eye Contact: normal  Speech: normal rate and volume, well-articulated  Mood: Euthymic  Affect: euthymic  Perception: within normal limits  Thought Content: within normal limits  Thought Process: logical, coherent and goal-directed  Insight: good  Judgment: intact  Ability to understand instructions: Yes  Ability to respond meaningfully: Yes  Morbid Ideation: no   Suicide Assessment: no suicidal ideation, plan, or intent  Homicidal Ideation: no    History:    Medications:   Current Outpatient Medications   Medication Sig Dispense Refill    tretinoin (RETIN-A) 0.05 % cream Apply topically nightly 45 g 3    diclofenac sodium (VOLTAREN) 1 % GEL Apply 2 g topically 4 times daily as needed for Pain 956 g 3    folic acid (FOLVITE) 1 MG tablet Take 1 tablet by mouth daily 90 tablet 0    INSULIN SYRINGE 1CC/29G (COMFORT ASSIST INSULIN SYRINGE) 29G X 1/2\" 1 ML MISC 1 each by Does not apply route once a week Use one syringe to inject weekly Methotrexate into skin. 10 each 2    naproxen (NAPROSYN) 500 MG tablet Take 1 tablet by mouth 2 times daily (with meals) 180 tablet 0    Abatacept (ORENCIA CLICKJECT) 945 MG/ML SOAJ Inject 1 pen into the skin once a week 12 each 0    DULoxetine (CYMBALTA) 60 MG extended release capsule Take 1 capsule by mouth in the morning. 90 capsule 0    amitriptyline (ELAVIL) 10 MG tablet       drospirenone-ethinyl estradiol (JOLENE) 3-0.02 MG per tablet Take 1 tablet by mouth daily      Handicap Placard MISC by Does not apply route Permanent. 1 each 0     No current facility-administered medications for this visit. Social History:   Social History     Socioeconomic History    Marital status: Single     Spouse name: Not on file    Number of children: Not on file    Years of education: Not on file    Highest education level: Not on file   Occupational History    Not on file   Tobacco Use    Smoking status: Never    Smokeless tobacco: Never   Vaping Use    Vaping Use: Never used   Substance and Sexual Activity    Alcohol use:  Yes     Alcohol/week: 3.0 standard drinks     Types: 3 Glasses of wine per week     Comment: socially Drug use: No    Sexual activity: Not Currently     Partners: Male     Birth control/protection: Pill   Other Topics Concern    Not on file   Social History Narrative    Not on file     Social Determinants of Health     Financial Resource Strain: Low Risk     Difficulty of Paying Living Expenses: Not hard at all   Food Insecurity: No Food Insecurity    Worried About Running Out of Food in the Last Year: Never true    [address] in the Last Year: Never true   Transportation Needs: No Transportation Needs    Lack of Transportation (Medical): No    Lack of Transportation (Non-Medical): No   Physical Activity: Not on file   Stress: Not on file   Social Connections: Not on file   Intimate Partner Violence: Not on file   Housing Stability: Not on file     TOBACCO:   reports that she has never smoked. She has never used smokeless tobacco.  ETOH:   reports current alcohol use of about 3.0 standard drinks per week. Family History:   Family History   Problem Relation Age of Onset    Rheum Arthritis Mother     No Known Problems Father     No Known Problems Sister     Breast Cancer Paternal Grandmother      A:  Administered PHQ-9 and JENNY-7 (see below). Patient endorses 2 symptoms of depression and 1 symptom of anxiety. Denied SI. Diagnosis:    1.  Adjustment disorder, unspecified type          Diagnosis Date    [facility]     Plan:  Pt interventions:  See above    Pt Behavioral Change Plan:   See Pt Instructions

## 2022-12-08 NOTE — PATIENT INSTRUCTIONS
Values    Values are your compass for how you want to act as a human being. Values are not about what you want to get or achieve; they are about how you want to behave or act. There are literally hundreds of different values, but below you'll find a list of the most common ones. Probably, not all of them will apply to you. Keep in mind there are no such things as 'right values' or 'wrong values'. It's a bit like our taste in pizzas. If you prefer ham and pineapple but I prefer salami and olives, that doesn't mean that my taste in pizzas is right and yours is wrong. It just means we have different tastes. And similarly, we may have different values.      Narrow down this list of values to 6 most important to you:     Acceptance: to be open to and accepting of myself, others, and life  Adventure: to be daring; to find or explore new experiences  Authenticity: to be genuine, real; to be true to myself  Beauty: to appreciate or create beauty in myself, others, and my surroundings  Caring: to be caring towards myself, others, and my surroundings  Challenge: to keep challenging myself to grow, learn, and improve  Contribution: to make a positive difference to myself or others  Courage: to be courageous or brave; to continue in the face of fear, threat, or difficulty  Creativity: to be creative or artistic  Curiosity: to be curious, open-minded and interested; to explore and discover  Fairness: to be fair to myself or others  Fitness: to look after my physical and mental health   Friendliness: to be friendly and welcoming towards others  Forgiveness: to be forgiving towards myself or others  Fun: to be fun-loving; to seek, create, and engage in fun-filled activities  Generosity: to be giving to myself or others  Gratitude: to appreciate the positive parts of myself, others and life  Honesty: to be honest and truthful with myself and others  Humor: to see and appreciate the funny side of life  Humility: to be humble or modest; to let my achievements speak for themselves  [place]: to be self-supportive, and choose my own way of doing things  Order: to be clean and organized  Patience: to wait calmly for what I want  Persistence: to be strong when facing problems or difficulties.   Power: to take charge and be a leader  Respect: to be respectful towards myself or others; to be polite and considerate  Responsibility: to be responsible and accountable for my actions  Self-care: to look after my health and wellbeing, and get my needs met  Supportive: to be helpful, encouraging, and available to myself or others  Trust: to be trustworthy; to be loyal, faithful, and reliable     2) Acceptance and Commitment Therapy (ACT) Chronic Pain Book:    Living Beyond your Pain ([email or web address])

## 2022-12-22 ENCOUNTER — PATIENT MESSAGE (OUTPATIENT)
Dept: PSYCHOLOGY | Age: 27
End: 2022-12-22

## 2023-01-17 ENCOUNTER — TELEMEDICINE (OUTPATIENT)
Dept: PSYCHOLOGY | Age: 28
End: 2023-01-17
Payer: COMMERCIAL

## 2023-01-17 DIAGNOSIS — F43.20 ADJUSTMENT DISORDER, UNSPECIFIED TYPE: Primary | ICD-10-CM

## 2023-01-17 PROCEDURE — 90832 PSYTX W PT 30 MINUTES: CPT | Performed by: PSYCHOLOGIST

## 2023-01-18 ENCOUNTER — OFFICE VISIT (OUTPATIENT)
Dept: RHEUMATOLOGY | Age: 28
End: 2023-01-18
Payer: COMMERCIAL

## 2023-01-18 ENCOUNTER — PATIENT MESSAGE (OUTPATIENT)
Dept: RHEUMATOLOGY | Age: 28
End: 2023-01-18

## 2023-01-18 VITALS
HEART RATE: 110 BPM | BODY MASS INDEX: 22.92 KG/M2 | WEIGHT: 133.5 LBS | DIASTOLIC BLOOD PRESSURE: 70 MMHG | SYSTOLIC BLOOD PRESSURE: 110 MMHG

## 2023-01-18 DIAGNOSIS — M05.79 RHEUMATOID ARTHRITIS INVOLVING MULTIPLE SITES WITH POSITIVE RHEUMATOID FACTOR (HCC): ICD-10-CM

## 2023-01-18 DIAGNOSIS — Z79.899 HIGH RISK MEDICATION USE: ICD-10-CM

## 2023-01-18 DIAGNOSIS — M79.7 FIBROMYALGIA: ICD-10-CM

## 2023-01-18 DIAGNOSIS — Z79.899 HIGH RISK MEDICATION USE: Primary | ICD-10-CM

## 2023-01-18 DIAGNOSIS — M05.79 RHEUMATOID ARTHRITIS INVOLVING MULTIPLE SITES WITH POSITIVE RHEUMATOID FACTOR (HCC): Primary | ICD-10-CM

## 2023-01-18 LAB
ALT SERPL-CCNC: 12 U/L (ref 10–40)
AST SERPL-CCNC: 13 U/L (ref 15–37)
BASOPHILS ABSOLUTE: 0 K/UL (ref 0–0.2)
BASOPHILS RELATIVE PERCENT: 0.3 %
C-REACTIVE PROTEIN: 12.5 MG/L (ref 0–5.1)
CREAT SERPL-MCNC: 0.7 MG/DL (ref 0.6–1.1)
EOSINOPHILS ABSOLUTE: 0.1 K/UL (ref 0–0.6)
EOSINOPHILS RELATIVE PERCENT: 1.1 %
GFR SERPL CREATININE-BSD FRML MDRD: >60 ML/MIN/{1.73_M2}
HCT VFR BLD CALC: 40.5 % (ref 36–48)
HEMOGLOBIN: 13.7 G/DL (ref 12–16)
LYMPHOCYTES ABSOLUTE: 2.6 K/UL (ref 1–5.1)
LYMPHOCYTES RELATIVE PERCENT: 38.9 %
MCH RBC QN AUTO: 30.6 PG (ref 26–34)
MCHC RBC AUTO-ENTMCNC: 33.7 G/DL (ref 31–36)
MCV RBC AUTO: 90.8 FL (ref 80–100)
MONOCYTES ABSOLUTE: 0.4 K/UL (ref 0–1.3)
MONOCYTES RELATIVE PERCENT: 6.4 %
NEUTROPHILS ABSOLUTE: 3.6 K/UL (ref 1.7–7.7)
NEUTROPHILS RELATIVE PERCENT: 53.3 %
PDW BLD-RTO: 12.3 % (ref 12.4–15.4)
PLATELET # BLD: 197 K/UL (ref 135–450)
PMV BLD AUTO: 7.9 FL (ref 5–10.5)
RBC # BLD: 4.46 M/UL (ref 4–5.2)
SEDIMENTATION RATE, ERYTHROCYTE: 12 MM/HR (ref 0–20)
TOTAL CK: 43 U/L (ref 26–192)
WBC # BLD: 6.7 K/UL (ref 4–11)

## 2023-01-18 PROCEDURE — 99214 OFFICE O/P EST MOD 30 MIN: CPT | Performed by: INTERNAL MEDICINE

## 2023-01-18 RX ORDER — FOLIC ACID 1 MG/1
1 TABLET ORAL DAILY
Qty: 90 TABLET | Refills: 0 | Status: SHIPPED | OUTPATIENT
Start: 2023-01-18 | End: 2023-04-18

## 2023-01-18 RX ORDER — ABATACEPT 125 MG/ML
1 INJECTION, SOLUTION SUBCUTANEOUS WEEKLY
Qty: 12 EACH | Refills: 1 | Status: SHIPPED | OUTPATIENT
Start: 2023-01-18 | End: 2023-04-18

## 2023-01-18 RX ORDER — PREDNISONE 1 MG/1
TABLET ORAL
Qty: 67 TABLET | Refills: 2 | Status: SHIPPED | OUTPATIENT
Start: 2023-01-18 | End: 2023-02-12

## 2023-01-18 RX ORDER — NAPROXEN 500 MG/1
500 TABLET ORAL 2 TIMES DAILY PRN
Qty: 180 TABLET | Refills: 1 | Status: SHIPPED | OUTPATIENT
Start: 2023-01-18 | End: 2023-04-18

## 2023-01-18 RX ORDER — GABAPENTIN 100 MG/1
100 CAPSULE ORAL NIGHTLY
Qty: 90 CAPSULE | Refills: 0 | Status: SHIPPED | OUTPATIENT
Start: 2023-01-18 | End: 2023-07-17

## 2023-01-18 NOTE — PROGRESS NOTES
Behavioral Health Consultation  [facility], B.S. Psychology Assistant  Woody Red, Ph.D.  Psychology Supervisor  1/17/2023  2:09 PM EST      Time spent with Patient: 30 minutes    Reason for Consult:    Chief Complaint   Patient presents with    Anxiety    Depression     Referring Provider: No referring provider defined for this encounter. Feedback given to PCP. S:  Pt seen for follow-up. Pt shared that her mood has been stable but has been experiencing lingering pain and chronic fatigue. She has an appt in March with a new rheumatologist; she shared she is looking forward to a new provider. Discussed ways to prepare for new pt appointment. Focused intervention on psychoed re: ACT for chronic pain and valued living. Pt expressed some concern for acceptance-based approach being too \"pessimistic\"; reframed concept as way to allow for valued living while seeking tx to control pain and fatigue. Pt expressed understanding and was interested in values exploration. Pt identified values of self-care and challenge; and described that she feels adequately challenged at work but wants to challenge herself socially. Discussed ways to balance self-care to manage fatigue with value of challenge. Pt is interested in setting a goal to have 1-2 social outings/month. Will continue values exploration at next visit.     O:  MSE:    Appearance: good hygiene   Attitude: cooperative and friendly  Consciousness: alert  Orientation: oriented to person, place, time, general circumstance  Memory: recent and remote memory intact  Attention/Concentration: intact during session  Psychomotor Activity:normal  Eye Contact: normal  Speech: normal rate and volume, well-articulated  Mood: Euthymic  Affect: euthymic  Perception: within normal limits  Thought Content: within normal limits  Thought Process: logical, coherent and goal-directed  Insight: good  Judgment: intact  Ability to understand instructions: Yes  Ability to respond meaningfully: Yes  Morbid Ideation: no   Suicide Assessment: no suicidal ideation, plan, or intent  Homicidal Ideation: no     History:    Medications:   Current Outpatient Medications   Medication Sig Dispense Refill    Abatacept (ORENCIA CLICKJECT) 125 MG/ML SOAJ Inject 1 pen into the skin once a week 12 each 1    naproxen (NAPROSYN) 500 MG tablet Take 1 tablet by mouth 2 times daily as needed for Pain 180 tablet 1    folic acid (FOLVITE) 1 MG tablet Take 1 tablet by mouth daily 90 tablet 0    predniSONE (DELTASONE) 5 MG tablet Take 4 tablets by mouth daily for 7 days, THEN 3 tablets daily for 7 days, THEN 2 tablets daily for 7 days, THEN 1 tablet daily for 4 days. 67 tablet 2    gabapentin (NEURONTIN) 100 MG capsule Take 1 capsule by mouth nightly for 180 days. 90 capsule 0    methotrexate (RHEUMATREX) 2.5 MG chemo tablet Take 7 tablets by mouth once a week 28 tablet 2    tretinoin (RETIN-A) 0.05 % cream Apply topically nightly 45 g 3    diclofenac sodium (VOLTAREN) 1 % GEL Apply 2 g topically 4 times daily as needed for Pain 100 g 3    drospirenone-ethinyl estradiol (JOLENE) 3-0.02 MG per tablet Take 1 tablet by mouth daily      Handicap Placard MISC by Does not apply route Permanent. 1 each 0     No current facility-administered medications for this visit.     Social History:   Social History     Socioeconomic History    Marital status: Single     Spouse name: Not on file    Number of children: Not on file    Years of education: Not on file    Highest education level: Not on file   Occupational History    Not on file   Tobacco Use    Smoking status: Never    Smokeless tobacco: Never   Vaping Use    Vaping Use: Never used   Substance and Sexual Activity    Alcohol use: Yes     Alcohol/week: 3.0 standard drinks     Types: 3 Glasses of wine per week     Comment: socially    Drug use: No    Sexual activity: Not Currently     Partners: Male     Birth control/protection: Pill   Other Topics Concern    Not on file  Social History Narrative    Not on file     Social Determinants of Health     Financial Resource Strain: Low Risk     Difficulty of Paying Living Expenses: Not hard at all   Food Insecurity: No Food Insecurity    Worried About [address] in the Last Year: Never true    [address] in the Last Year: Never true   Transportation Needs: No Transportation Needs    Lack of Transportation (Medical): No    Lack of Transportation (Non-Medical): No   Physical Activity: Not on file   Stress: Not on file   Social Connections: Not on file   Intimate Partner Violence: Not on file   Housing Stability: Not on file     TOBACCO:   reports that she has never smoked. She has never used smokeless tobacco.  ETOH:   reports current alcohol use of about 3.0 standard drinks per week. Family History:   Family History   Problem Relation Age of Onset    Rheum Arthritis Mother     No Known Problems Father     No Known Problems Sister     Breast Cancer Paternal Grandmother        Diagnosis:    1.  Adjustment disorder, unspecified type          Diagnosis Date    [facility] 2/12/2019       Plan:  Pt interventions:  See above    Pt Behavioral Change Plan:   See Pt Instructions

## 2023-01-18 NOTE — ASSESSMENT & PLAN NOTE
- remains active. We discussed the disease course of fibromyalgia in detail today. - unable to tolerate Duloxetine. Pt agreed to try low dose Gabapentin 100 mg QHS. - encouraged regular, aerobic exercise.

## 2023-01-18 NOTE — ASSESSMENT & PLAN NOTE
- she will obtain labs Q3mo for MTX toxicity monitoring.  - discussed her immunocompromised state on MTX and Abatacept and indications to hold these medications.

## 2023-01-18 NOTE — PROGRESS NOTES
Caro Mcadams MD  [facility]) Physicians - Rheumatology    [x] [facility]:  [facility] [address] [facility] [] [facility]:  [address]   Office: [phone]  Fax: [phone]     RHEUMATOLOGY PROGRESS NOTE    ASSESSMENT/PLAN:  Rob Ruano is a 33 y.o. female w/ seropositive RA (RF 38, negative CCP) and fibromyalgia. PMHx pertinent for IBS (per pt Dx by GI (Dr. Juan Anaya) in 2018) and depression. Current rheum meds:  MTX 12.5 mg PO wkly: took from 4/2019-9/2021, switched to Leflunomide d/t fatigue, resumed in 9/2022 as fatigue did not improve after stopping MTX  Abatacept 125 mg SC wkly: started in 6/2022  Naproxen 500 mg BID PRN     Prior rheum meds:   mg daily: took from 3/19 to 7/19, caused diarrhea  Leflunomide 10 mg daily: took from 9/2021-12/2021, switched to SSZ d/t hair loss  SSZ 1000 mg BID: started in 12/2021, unable to tolerate  Etanercept: took from 5/2020-8/2022, switched to Abatacept  Sarilumab: denied by insurance  Amitriptyline   Duloxetine 60 mg daily: caused bloating and constipation    1. Rheumatoid arthritis involving multiple sites with positive rheumatoid factor (HCC)  Assessment & Plan:  - overall improved since switching Etanercept to Abatacept. CRP was most recently elevated on 11/11/22. She had diffuse myofascial tenderness on exam today, no significant synovitis. - repeat CRP and ESR now. Plan on increasing MTX dose from 12.5 mg to 17.5 mg PO wkly if CRP remains elevated. - cont Abatacept 125 mg SC wkly for now. Provided pt handout on Upadacitinib. - prescribed low dose Prednisone tapers for RA flares. Orders:  -     methotrexate (RHEUMATREX) 2.5 MG chemo tablet; Take 5 tablets by mouth once a week, Disp-60 tablet, R-1Normal  -     Abatacept (ORENCIA CLICKJECT) 583 MG/ML SOAJ; Inject 1 pen into the skin once a week, Disp-12 each, R-1Normal  -     naproxen (NAPROSYN) 500 MG tablet;  Take 1 tablet by mouth 2 times daily as needed for Pain, Disp-180 tablet, [id number]  -     folic acid (FOLVITE) 1 MG tablet; Take 1 tablet by mouth daily, Disp-90 tablet, R-0Normal  -     C-Reactive Protein; Future  -     Sedimentation Rate; Future  -     gabapentin (NEURONTIN) 100 MG capsule; Take 1 capsule by mouth nightly for 180 days. , Disp-90 capsule, R-0Normal  2. Fibromyalgia  Assessment & Plan:  - remains active. We discussed the disease course of fibromyalgia in detail today. - unable to tolerate Duloxetine. Pt agreed to try low dose Gabapentin 100 mg QHS. - encouraged regular, aerobic exercise. Orders:  -     gabapentin (NEURONTIN) 100 MG capsule; Take 1 capsule by mouth nightly for 180 days. , Disp-90 capsule, R-0Normal  -     CK; Future  -     Aldolase; Future  3. High risk medication use  Assessment & Plan:  - she will obtain labs Q3mo for MTX toxicity monitoring.  - discussed her immunocompromised state on MTX and Abatacept and indications to hold these medications. Orders:  -     ALT; Future  -     AST; Future  -     CBC with Auto Differential; Future  -     Creatinine; Future     Return in about 2 months (around 3/18/2023) for lab result discussion and treatment plan, medication monitoring. The risks and benefits of my recommendations, as well as other treatment options, benefits and side effects were discussed with the patient today. Questions were answered. NOTE: This report is transcribed by using voice recognition software dragon. Every effort is made to ensure accuracy; however, inadvertent computerized  transcription errors may be present. SUBJECTIVE:  Past medical/surgical history, medications and allergies are reviewed and updated as appropriate. Interval Hx:   Pt returns to the office today for Rheumatology f/u. She was last seen on 8/30/22. Pt states she is currently taking Abatacept 125 mg SC wkly and MTX 12.5 PO wkly.  She was unable to tolerate subcutaneous MTX because she could not stand the smell of it. She notes worsening arthralgias when she had to hold MTX and Abatacept for two wks in December d/t an ear infection. She feels Abatacept overall is more effective than Etanercept but she cont to have widespread pain. She reports diffuse pain \"not in my joints, but in all of my muscles\". She became tearful discussing her pain. She is going to a massage therapist. Pt states she has been taking a Prednisone taper once every 1-2 months, she feels Prednisone does improve her pain. She was unable to tolerate Duloxetine d/t bloating and constipation. She cont to feel fatigued. Denies snoring. She confirms that her fatigue did not improve when she held MTX. She reports stable Raynaud's phenomenon in her toes - they turn red w/ cold exposure. Denies gangrene or digital ulcers. She feels frustrated about her health. She is unable to exercise d/t pain and fatigue.     ROS:  Constitutional: denies chronic fatigue, fever/chills, night sweats, unintentional weight loss  Integumentary: denies photosensitivity, rash, patchy alopecia, +stable monophasic Raynaud's phenomenon  Eyes: denies dry eyes, redness or pain, visual disturbance, or floaters  Nose: denies nasal ulcers or recurrent sinusitis  Oral cavity: denies dry mouth or oral ulcers  Cardiovascular: denies CP, palpitations, Hx of pericardial effusion or pericarditis  Respiratory: denies SOB, cough, hemoptysis, or pleurisy  Musculoskeletal:  refer to above HPI     Allergies   Allergen Reactions    [place] Itching and Swelling     Patient did allergy testing   Causes redness too        Past Medical History:        Diagnosis Date    Polyarthritis with positive rheumatoid factor ([address] 2/12/2019       Past Surgical History:        Procedure Laterality Date    INNER EAR SURGERY Left 2010    [facility]    INNER EAR SURGERY Right 2020    WISDOM TOOTH EXTRACTION         Medications:    Current Outpatient Medications   Medication Sig Dispense Refill    methotrexate (RHEUMATREX) 2.5 MG chemo tablet Take 5 tablets by mouth once a week 60 tablet 1    Abatacept (ORENCIA CLICKJECT) 205 MG/ML SOAJ Inject 1 pen into the skin once a week 12 each 1    naproxen (NAPROSYN) 500 MG tablet Take 1 tablet by mouth 2 times daily as needed for Pain 300 tablet 1    folic acid (FOLVITE) 1 MG tablet Take 1 tablet by mouth daily 90 tablet 0    predniSONE (DELTASONE) 5 MG tablet Take 4 tablets by mouth daily for 7 days, THEN 3 tablets daily for 7 days, THEN 2 tablets daily for 7 days, THEN 1 tablet daily for 4 days. 67 tablet 2    gabapentin (NEURONTIN) 100 MG capsule Take 1 capsule by mouth nightly for 180 days. 90 capsule 0    tretinoin (RETIN-A) 0.05 % cream Apply topically nightly 45 g 3    diclofenac sodium (VOLTAREN) 1 % GEL Apply 2 g topically 4 times daily as needed for Pain 100 g 3    drospirenone-ethinyl estradiol (JOLENE) 3-0.02 MG per tablet Take 1 tablet by mouth daily      Handicap Placard MISC by Does not apply route Permanent. 1 each 0     No current facility-administered medications for this visit.         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, full fist formation w/ good  strength              Wrist: b/l wrist joints w/o synovitis TTP, FROM              Elbow: no synovitis or bursitis, FROM  Lower extremities:              Knees: no warmth or effusion present, FROM              Ankles: no synovitis, FROM   Feet: no toe swelling, toes w/ mild erythema  INTEGUMENT: no active rash, no skin thickening, no malar rash or psoriatic lesions, no petechiae, bruises, or palpable purpura, no patchy alopecia, no nail or periungual changes, no clubbing or digital ulcers    DATA:  Labs:   I personally reviewed interval labs and discussed w/ the pt in detail which showed:    Lab Results   Component Value Date    WBC 6.0 11/11/2022    HGB 14.3 11/11/2022    HCT 41.6 11/11/2022    MCV 90.7 11/11/2022     11/11/2022    LYMPHOPCT 48.4 11/11/2022    RBC 4.58 11/11/2022    MCH 31.1 11/11/2022    MCHC 34.3 11/11/2022    RDW 12.2 (L) 11/11/2022     Lab Results   Component Value Date     11/11/2022    K 4.0 11/11/2022     11/11/2022    CO2 25 11/11/2022    BUN 11 11/11/2022    CREATININE 0.7 11/11/2022    GLUCOSE 81 11/11/2022    CALCIUM 9.7 11/11/2022    PROT 6.9 11/11/2022    LABALBU 4.4 11/11/2022    BILITOT 0.4 11/11/2022    ALKPHOS 61 11/11/2022    AST 16 11/11/2022    ALT 14 11/11/2022    LABGLOM >60 11/11/2022    GFRAA >60 08/16/2022    AGRATIO 1.8 11/11/2022    GLOB 3.0 02/21/2019     Lab Results   Component Value Date    VITD25 39.8 11/11/2022      Lab Results   Component Value Date    C3 118.4 02/21/2019     Lab Results   Component Value Date    C4 12.8 02/21/2019     No results found for: ANTIDSDNAIGG     Lab Results   Component Value Date    LABURIC 2.5 (L) 02/08/2019     Lab Results   Component Value Date    CRP 10.6 (H) 11/11/2022    CRP 9.9 (H) 08/16/2022    CRP 7.6 (H) 02/02/2022    CRP 4.4 07/24/2020     Lab Results   Component Value Date    SEDRATE 7 12/18/2019    SEDRATE 6 02/08/2019     VECTRA DA (9/30/20): 38 (moderate)  VECTRA DA (5/1/21): 32 (moderate)    No results found for: CKTOTAL    RF 38.0, negative CCP (2/21/19)  Negative HLA B27 (2/21/19)  Negative DORCAS (2/21/19)  Negative SSA, SSB, scl-70 (3/14/19)  Negative LA, B2GP-1 IgM and IgG, aCL IgM and IgG (2/21/19)  Negative ANCA by IFA, MPO, PR3 (3/14/19)  Negative cryoglobulin (2/21/19)  Negative hepatitis B and C serologies (2/21/19)  Negative QuantiFERON (2/2/22)    Imaging:  I personally reviewed interval imaging and discussed w/ the pt in detail which included:    X-rays (2/21/19):  R hand: Bone mineralization is normal.  No fracture or dislocation. Joint alignment and joint spaces are maintained. No erosions are present. L hand: Bone mineralization is normal.  No fracture or dislocation. Joint alignment and joint spaces are maintained. No erosions are present. R foot: Bone mineralization is normal.  No fracture or dislocation. Joint alignment and joint spaces are maintained. No erosions are present. L foot: Bone mineralization is normal.  No fracture or dislocation. Joint alignment and joint spaces are maintained. No erosions are present. B/l SI joints: No fracture or dislocation. Sacral arcuate lines are intact. Evaluation is somewhat limited by overlying stool and bowel gas, however there is no evidence of ankylosis. Joint spaces appear maintained and no erosions are present. B/l hips appear well maintained and normal in position. MRI hands (3/14/19):  R hand:  No evidence of RA in this nonenhanced exam.  L hand:  Incidental mild tendinosis of the flexor pollicis longus. No evidence of RA in this nonenhanced MRI of the hand. MRI b/l feet w/o contrast (3/4/19): Unremarkable study. Procedures:  EGD w/ Bx(8/2/18): Normal findings of the stomach, duodenal bulb and the duodenum. Duodenal Bx:  Duodenal mucosa w/o significant histopathologic changes. Stomach Bx:  Mild chronic inactive gastritis, negative for Helicobacter-like organisms. Above results were discussed w/ the pt in detail during today's visit.

## 2023-01-18 NOTE — RESULT ENCOUNTER NOTE
Inflammatory marker, CRP is trending up. As we discussed, I want her to increase her MTX dose from 5 tabs to 7 tabs wkly and repeat labs 4 wks after increasing her MTX dose. Placed standing lab orders for MTX.  Rest of her labs are normal.

## 2023-01-18 NOTE — ASSESSMENT & PLAN NOTE
- overall improved since switching Etanercept to Abatacept. CRP was most recently elevated on 11/11/22. She had diffuse myofascial tenderness on exam today, no significant synovitis. - repeat CRP and ESR now. Plan on increasing MTX dose from 12.5 mg to 17.5 mg PO wkly if CRP remains elevated. - cont Abatacept 125 mg SC wkly for now. Provided pt handout on Upadacitinib. - prescribed low dose Prednisone tapers for RA flares.

## 2023-01-20 LAB — ALDOLASE: 1.9 U/L (ref 1.2–7.6)

## 2023-01-23 NOTE — TELEPHONE ENCOUNTER
From: DESTIN Enrique  To: Edilia Ta  Sent: 1/18/2023 3:11 PM EST  Subject: Lab results and instructions    Per Dr. Lorin Garza,    Inflammatory marker, CRP is trending up. As we discussed, I want her to increase her MTX dose from 5 tabs to 7 tabs wkly and repeat labs 4 wks after increasing her MTX dose. Placed standing lab orders for MTX.  Rest of her labs are normal.

## 2023-01-31 ENCOUNTER — TELEMEDICINE (OUTPATIENT)
Dept: PSYCHOLOGY | Age: 28
End: 2023-01-31
Payer: COMMERCIAL

## 2023-01-31 DIAGNOSIS — F43.20 ADJUSTMENT DISORDER, UNSPECIFIED TYPE: Primary | ICD-10-CM

## 2023-01-31 PROCEDURE — 90832 PSYTX W PT 30 MINUTES: CPT | Performed by: PSYCHOLOGIST

## 2023-02-02 PROBLEM — F41.9 ANXIETY: Chronic | Status: ACTIVE | Noted: 2022-08-30

## 2023-02-02 NOTE — PROGRESS NOTES
Behavioral Health Consultation  [facility], B.S. Psychology Assistant  Anitha Mitchell, Ph.D.  Psychology Supervisor  1/17/2023  2:09 PM EST      Time spent with Patient: 30 minutes    Reason for Consult:    Chief Complaint   Patient presents with    Other       Referring Provider: No referring provider defined for this encounter. Feedback given to PCP. S:  Pt seen for follow-up. Pt is still struggling with fatigue, which she notes is making it difficult for her to have time to connect with her values outside of work. She described having thoughts about what is \"worth it\" in terms of sacrificing her physical comfort and losing sleep time. 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 Focused intervention on valued living, acceptance-based strategies, and mindfulness. Pt will practice mindfulness using guided videos once/day for self-care.       O:  MSE:    Appearance: good hygiene   Attitude: cooperative and friendly  Consciousness: alert  Orientation: oriented to person, place, time, general circumstance  Memory: recent and remote memory intact  Attention/Concentration: intact during session  Psychomotor Activity:normal  Eye Contact: normal  Speech: normal rate and volume, well-articulated  Mood: Euthymic  Affect: euthymic  Perception: within normal limits  Thought Content: within normal limits  Thought Process: logical, coherent and goal-directed  Insight: good  Judgment: intact  Ability to understand instructions: Yes  Ability to respond meaningfully: Yes  Morbid Ideation: no   Suicide Assessment: no suicidal ideation, plan, or intent  Homicidal Ideation: no     History:    Medications:   Current Outpatient Medications   Medication Sig Dispense Refill    methotrexate (RHEUMATREX) 2.5 MG chemo tablet Take 7 tablets by mouth once a week 84 tablet 0    Abatacept (ORENCIA CLICKJECT) 899 MG/ML SOAJ Inject 1 pen into the skin once a week 12 each 1    naproxen (NAPROSYN) 500 MG tablet Take 1 tablet by mouth 2 times daily as needed for Pain 489 tablet 1    folic acid (FOLVITE) 1 MG tablet Take 1 tablet by mouth daily 90 tablet 0    predniSONE (DELTASONE) 5 MG tablet Take 4 tablets by mouth daily for 7 days, THEN 3 tablets daily for 7 days, THEN 2 tablets daily for 7 days, THEN 1 tablet daily for 4 days. 67 tablet 2    gabapentin (NEURONTIN) 100 MG capsule Take 1 capsule by mouth nightly for 180 days. 90 capsule 0    tretinoin (RETIN-A) 0.05 % cream Apply topically nightly 45 g 3    diclofenac sodium (VOLTAREN) 1 % GEL Apply 2 g topically 4 times daily as needed for Pain 100 g 3    drospirenone-ethinyl estradiol (JOLENE) 3-0.02 MG per tablet Take 1 tablet by mouth daily      Handicap Placard MISC by Does not apply route Permanent. 1 each 0     No current facility-administered medications for this visit. Social History:   Social History     Socioeconomic History    Marital status: Single     Spouse name: Not on file    Number of children: Not on file    Years of education: Not on file    Highest education level: Not on file   Occupational History    Not on file   Tobacco Use    Smoking status: Never    Smokeless tobacco: Never   Vaping Use    Vaping Use: Never used   Substance and Sexual Activity    Alcohol use: Yes     Alcohol/week: 3.0 standard drinks     Types: 3 Glasses of wine per week     Comment: socially    Drug use: No    Sexual activity: Not Currently     Partners: Male     Birth control/protection: Pill   Other Topics Concern    Not on file   Social History Narrative    Not on file     Social Determinants of Health     Financial Resource Strain: Low Risk     Difficulty of Paying Living Expenses: Not hard at all   Food Insecurity: No Food Insecurity    Worried About [address] in the Last Year: Never true    [address] in the Last Year: Never true   Transportation Needs: No Transportation Needs    Lack of Transportation (Medical):  No Lack of Transportation (Non-Medical): No   Physical Activity: Not on file   Stress: Not on file   Social Connections: Not on file   Intimate Partner Violence: Not on file   Housing Stability: Not on file     TOBACCO:   reports that she has never smoked. She has never used smokeless tobacco.  ETOH:   reports current alcohol use of about 3.0 standard drinks per week. Family History:   Family History   Problem Relation Age of Onset    Rheum Arthritis Mother     No Known Problems Father     No Known Problems Sister     Breast Cancer Paternal Grandmother        Diagnosis:    1. Adjustment disorder, unspecified type            Diagnosis Date    Polyarthritis with positive rheumatoid factor ([facility] 2/12/2019       Plan:  Pt interventions:  See above    Pt Behavioral Change Plan:   See Pt Instructions       Tita Weaver, was evaluated through a synchronous (real-time) audio-video encounter. The patient (or guardian if applicable) is aware that this is a billable service, which includes applicable co-pays. This Virtual Visit was conducted with patient's (and/or legal guardian's) consent. The visit was conducted pursuant to the emergency declaration under the [address] [place] [address], [address] [place] [address] waiver authority and the [employer] General Act. Patient identification was verified, and a caregiver was present when appropriate. The patient was located in a state where the provider was licensed to provide care.

## 2023-02-02 NOTE — PATIENT INSTRUCTIONS
Mindfulness Practice Links    Diaphragmatic Breathing (4 min): [email or web address]    Mindful Breathing (10 min): [email or web address]    Leaves on a Stream (3min): [email or web address]    Guided Visualization (7min): Arya

## 2023-02-08 ENCOUNTER — TELEPHONE (OUTPATIENT)
Dept: SLEEP CENTER | Age: 28
End: 2023-02-08

## 2023-02-08 NOTE — TELEPHONE ENCOUNTER
Called to schedule a psg / mslt per Aniyah Ward vm for the pt to return my call     [employer]

## 2023-02-14 ENCOUNTER — TELEMEDICINE (OUTPATIENT)
Dept: PSYCHOLOGY | Age: 28
End: 2023-02-14
Payer: COMMERCIAL

## 2023-02-14 DIAGNOSIS — F43.20 ADJUSTMENT DISORDER, UNSPECIFIED TYPE: Primary | ICD-10-CM

## 2023-02-14 PROCEDURE — 90832 PSYTX W PT 30 MINUTES: CPT | Performed by: PSYCHOLOGIST

## 2023-02-16 NOTE — PROGRESS NOTES
Behavioral Health Consultation  [facility], B.S. Psychology Assistant  Christine Greenwood, Ph.D.  Psychology Supervisor  2/15/2023  2:09 PM EST      Time spent with Patient: 30 minutes    Reason for Consult:    Chief Complaint   Patient presents with    Other     Referring Provider: No referring provider defined for this encounter. Feedback given to PCP. S:  Pt seen for follow-up. Pt had a sleep medicine appt this week and is hoping to get some answers re: fatigue (she is waiting on insurance authorization on a sleep study). She expressed some anxiety about upcoming appt with new rheumatologist. She identified that with doctors and in any difficult conversations she tends to shut down/get tearful easily and wants to leave/get out of the situation. She shared that this sometimes impacts her ability to communicate her needs in doctors appts. She used mindfulness a few times in the past few weeks and feels that it helped her clear her thoughts. Focused intervention on psychoed re: emotion regulation and fight or flight response, cognitive distortions, and introduction to mindfulness/grounding strategies.       O:  MSE:    Appearance: good hygiene   Attitude: cooperative and friendly  Consciousness: alert  Orientation: oriented to person, place, time, general circumstance  Memory: recent and remote memory intact  Attention/Concentration: intact during session  Psychomotor Activity:normal  Eye Contact: normal  Speech: normal rate and volume, well-articulated  Mood: Euthymic  Affect: euthymic  Perception: within normal limits  Thought Content: within normal limits  Thought Process: logical, coherent and goal-directed  Insight: good  Judgment: intact  Ability to understand instructions: Yes  Ability to respond meaningfully: Yes  Morbid Ideation: no   Suicide Assessment: no suicidal ideation, plan, or intent  Homicidal Ideation: no     History:    Medications:   Current Outpatient Medications   Medication Sig Dispense Refill    methotrexate (RHEUMATREX) 2.5 MG chemo tablet Take 7 tablets by mouth once a week 84 tablet 0    Abatacept (ORENCIA CLICKJECT) 717 MG/ML SOAJ Inject 1 pen into the skin once a week 12 each 1    naproxen (NAPROSYN) 500 MG tablet Take 1 tablet by mouth 2 times daily as needed for Pain 354 tablet 1    folic acid (FOLVITE) 1 MG tablet Take 1 tablet by mouth daily 90 tablet 0    gabapentin (NEURONTIN) 100 MG capsule Take 1 capsule by mouth nightly for 180 days. 90 capsule 0    tretinoin (RETIN-A) 0.05 % cream Apply topically nightly 45 g 3    diclofenac sodium (VOLTAREN) 1 % GEL Apply 2 g topically 4 times daily as needed for Pain 100 g 3    drospirenone-ethinyl estradiol (JOLENE) 3-0.02 MG per tablet Take 1 tablet by mouth daily      Handicap Placard MISC by Does not apply route Permanent. 1 each 0     No current facility-administered medications for this visit. Social History:   Social History     Socioeconomic History    Marital status: Single     Spouse name: Not on file    Number of children: Not on file    Years of education: Not on file    Highest education level: Not on file   Occupational History    Not on file   Tobacco Use    Smoking status: Never    Smokeless tobacco: Never   Vaping Use    Vaping Use: Never used   Substance and Sexual Activity    Alcohol use:  Yes     Alcohol/week: 3.0 standard drinks     Types: 3 Glasses of wine per week     Comment: socially    Drug use: No    Sexual activity: Not Currently     Partners: Male     Birth control/protection: Pill   Other Topics Concern    Not on file   Social History Narrative    Not on file     Social Determinants of Health     Financial Resource Strain: Low Risk     Difficulty of Paying Living Expenses: Not hard at all   Food Insecurity: No Food Insecurity    Worried About [address] in the Last Year: Never true    [address] in the Last Year: Never true   Transportation Needs: No Transportation Needs    Lack of Transportation (Medical): No    Lack of Transportation (Non-Medical): No   Physical Activity: Not on file   Stress: Not on file   Social Connections: Not on file   Intimate Partner Violence: Not on file   Housing Stability: Not on file     TOBACCO:   reports that she has never smoked. She has never used smokeless tobacco.  ETOH:   reports current alcohol use of about 3.0 standard drinks per week. Family History:   Family History   Problem Relation Age of Onset    Rheum Arthritis Mother     No Known Problems Father     No Known Problems Sister     Breast Cancer Paternal Grandmother        Diagnosis:    1. Adjustment disorder, unspecified type              Diagnosis Date    [facility]       Plan:  Pt interventions:  See above    Pt Behavioral Change Plan:   See Pt Instructions       Opalkendell Aguileraluana, was evaluated through a synchronous (real-time) audio-video encounter. The patient (or guardian if applicable) is aware that this is a billable service, which includes applicable co-pays. This Virtual Visit was conducted with patient's (and/or legal guardian's) consent. The visit was conducted pursuant to the emergency declaration under the [facility], [address] [place] [address] waiver authority and the [employer] and [employer] General Act. Patient identification was verified, and a caregiver was present when appropriate. The patient was located in a state where the provider was licensed to provide care.

## 2023-02-16 NOTE — PATIENT INSTRUCTIONS
When you are feeling anxious or overwhelmed, take 5 slow breaths and then:    Find FIVE things you can see around you    Find FOUR things you can touch around you    Find THREE things you can hear around you    Find TWO things you can smell    Find ONE thing you can taste

## 2023-02-28 ENCOUNTER — TELEMEDICINE (OUTPATIENT)
Dept: PSYCHOLOGY | Age: 28
End: 2023-02-28
Payer: COMMERCIAL

## 2023-02-28 DIAGNOSIS — F43.20 ADJUSTMENT DISORDER, UNSPECIFIED TYPE: Primary | ICD-10-CM

## 2023-02-28 PROCEDURE — 90832 PSYTX W PT 30 MINUTES: CPT | Performed by: PSYCHOLOGIST

## 2023-03-02 NOTE — PATIENT INSTRUCTIONS
Read first few chapters of Set Boundaries, Find Peace: A Guide to Reclaiming Yourself by Jonah Lima:  [email or web address]    Make a note of any parts that resonate with you.

## 2023-03-02 NOTE — PROGRESS NOTES
Behavioral Health Consultation  [facility], B.S. Psychology Assistant  Nasir Sharma, Ph.D.  Psychology Supervisor  2/28/2023  2:09 PM EST      Time spent with Patient: 30 minutes    Reason for Consult:    Chief Complaint   Patient presents with    Other       Referring Provider: No referring provider defined for this encounter. Feedback given to PCP. S:  Pt seen for follow-up. 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 Pt noted that she wanted to say something but described a pattern of avoiding difficult/assertive communication d/t not wanting to upset others and prioritizing others' feelings. Focused intervention on discussion of boundaries, assertive communication. Pt has some anxiety about upcoming rheumatology appt; discussed grounding and relaxation strategies to use during this appt. Also gave recommendation for boundaries book.     O:  MSE:    Appearance: good hygiene   Attitude: cooperative and friendly  Consciousness: alert  Orientation: oriented to person, place, time, general circumstance  Memory: recent and remote memory intact  Attention/Concentration: intact during session  Psychomotor Activity:normal  Eye Contact: normal  Speech: normal rate and volume, well-articulated  Mood: Euthymic  Affect: euthymic  Perception: within normal limits  Thought Content: within normal limits  Thought Process: logical, coherent and goal-directed  Insight: good  Judgment: intact  Ability to understand instructions: Yes  Ability to respond meaningfully: Yes  Morbid Ideation: no   Suicide Assessment: no suicidal ideation, plan, or intent  Homicidal Ideation: no     History:    Medications:   Current Outpatient Medications   Medication Sig Dispense Refill    methotrexate (RHEUMATREX) 2.5 MG chemo tablet Take 7 tablets by mouth once a week 84 tablet 0    Abatacept (ORENCIA CLICKJECT) 420 MG/ML SOAJ Inject 1 pen into the skin once a week 12 each 1    naproxen (NAPROSYN) 500 MG tablet Take 1 tablet by mouth 2 times daily as needed for Pain 450 tablet 1    folic acid (FOLVITE) 1 MG tablet Take 1 tablet by mouth daily 90 tablet 0    gabapentin (NEURONTIN) 100 MG capsule Take 1 capsule by mouth nightly for 180 days. 90 capsule 0    tretinoin (RETIN-A) 0.05 % cream Apply topically nightly 45 g 3    diclofenac sodium (VOLTAREN) 1 % GEL Apply 2 g topically 4 times daily as needed for Pain 100 g 3    drospirenone-ethinyl estradiol (JOLENE) 3-0.02 MG per tablet Take 1 tablet by mouth daily      Handicap Placard MISC by Does not apply route Permanent. 1 each 0     No current facility-administered medications for this visit. Social History:   Social History     Socioeconomic History    Marital status: Single     Spouse name: Not on file    Number of children: Not on file    Years of education: Not on file    Highest education level: Not on file   Occupational History    Not on file   Tobacco Use    Smoking status: Never    Smokeless tobacco: Never   Vaping Use    Vaping Use: Never used   Substance and Sexual Activity    Alcohol use: Yes     Alcohol/week: 3.0 standard drinks     Types: 3 Glasses of wine per week     Comment: socially    Drug use: No    Sexual activity: Not Currently     Partners: Male     Birth control/protection: Pill   Other Topics Concern    Not on file   Social History Narrative    Not on file     Social Determinants of Health     Financial Resource Strain: Low Risk     Difficulty of Paying Living Expenses: Not hard at all   Food Insecurity: No Food Insecurity    Worried About [address] in the Last Year: Never true    [address] in the Last Year: Never true   Transportation Needs: No Transportation Needs    Lack of Transportation (Medical): No    Lack of Transportation (Non-Medical):  No   Physical Activity: Not on file   Stress: Not on file   Social Connections: Not on file   Intimate Partner Violence: Not on file   Housing Stability: Not on file     TOBACCO:   reports that she has never smoked. She has never used smokeless tobacco.  ETOH:   reports current alcohol use of about 3.0 standard drinks per week. Family History:   Family History   Problem Relation Age of Onset    Rheum Arthritis Mother     No Known Problems Father     No Known Problems Sister     Breast Cancer Paternal Grandmother        Diagnosis:    1. Adjustment disorder, unspecified type                Diagnosis Date    Polyarthritis with positive rheumatoid factor ([facility] 2/12/2019       Plan:  Pt interventions:  See above    Pt Behavioral Change Plan:   See Pt Instructions       Munira Fuller, was evaluated through a synchronous (real-time) audio-video encounter. The patient (or guardian if applicable) is aware that this is a billable service, which includes applicable co-pays. This Virtual Visit was conducted with patient's (and/or legal guardian's) consent. The visit was conducted pursuant to the emergency declaration under the [address] [facility], [id number] waiver authority and the [employer] and [employer]ISEN[employer] General Act. Patient identification was verified, and a caregiver was present when appropriate. The patient was located in a state where the provider was licensed to provide care.

## 2023-03-14 ENCOUNTER — TELEMEDICINE (OUTPATIENT)
Dept: PSYCHOLOGY | Age: 28
End: 2023-03-14
Payer: COMMERCIAL

## 2023-03-14 DIAGNOSIS — F43.20 ADJUSTMENT DISORDER, UNSPECIFIED TYPE: Primary | ICD-10-CM

## 2023-03-14 PROCEDURE — 90832 PSYTX W PT 30 MINUTES: CPT | Performed by: PSYCHOLOGIST

## 2023-03-16 NOTE — PATIENT INSTRUCTIONS
Set Boundaries, Find Peace: A Guide to Reclaiming Yourself by Jeri Moreing:  Basil    Make a note of any parts that resonate with you.

## 2023-03-16 NOTE — PROGRESS NOTES
Behavioral Health Consultation  [facility], B.S. Psychology Assistant  Cathleen Foley, Ph.D.  Psychology Supervisor  3/14/2023  2:09 PM EST      Time spent with Patient: 30 minutes    Reason for Consult:    Chief Complaint   Patient presents with    Other       Referring Provider: No referring provider defined for this encounter. Feedback given to PCP. S:  Pt seen for follow-up. Pt has had a busy week at work. She had an appt with her new rheumatologist which she feels went well; she has some bloodwork/testing coming up. As discussed, pt wrote a list of her concerns before the appt and felt she was able to manage feelings of anxiety. Focused intervention on discussion of assertive communication, emotion regulation, and boundaries. Pt reflected on her leadership and communication style and shared that she feels she is an introvert which makes assertiveness more difficult for her. Pt will look into boundaries book before next visit.     O:  MSE:    Appearance: good hygiene   Attitude: cooperative and friendly  Consciousness: alert  Orientation: oriented to person, place, time, general circumstance  Memory: recent and remote memory intact  Attention/Concentration: intact during session  Psychomotor Activity:normal  Eye Contact: normal  Speech: normal rate and volume, well-articulated  Mood: Euthymic  Affect: euthymic  Perception: within normal limits  Thought Content: within normal limits  Thought Process: logical, coherent and goal-directed  Insight: good  Judgment: intact  Ability to understand instructions: Yes  Ability to respond meaningfully: Yes  Morbid Ideation: no   Suicide Assessment: no suicidal ideation, plan, or intent  Homicidal Ideation: no     History:    Medications:   Current Outpatient Medications   Medication Sig Dispense Refill    methotrexate (RHEUMATREX) 2.5 MG chemo tablet Take 7 tablets by mouth once a week 84 tablet 0    Abatacept (ORENCIA CLICKJECT) 976 MG/ML SOAJ Inject 1 pen into the skin once a week 12 each 1    naproxen (NAPROSYN) 500 MG tablet Take 1 tablet by mouth 2 times daily as needed for Pain 021 tablet 1    folic acid (FOLVITE) 1 MG tablet Take 1 tablet by mouth daily 90 tablet 0    gabapentin (NEURONTIN) 100 MG capsule Take 1 capsule by mouth nightly for 180 days. 90 capsule 0    tretinoin (RETIN-A) 0.05 % cream Apply topically nightly 45 g 3    diclofenac sodium (VOLTAREN) 1 % GEL Apply 2 g topically 4 times daily as needed for Pain 100 g 3    drospirenone-ethinyl estradiol (JOLENE) 3-0.02 MG per tablet Take 1 tablet by mouth daily      Handicap Placard MISC by Does not apply route Permanent. 1 each 0     No current facility-administered medications for this visit. Social History:   Social History     Socioeconomic History    Marital status: Single     Spouse name: Not on file    Number of children: Not on file    Years of education: Not on file    Highest education level: Not on file   Occupational History    Not on file   Tobacco Use    Smoking status: Never    Smokeless tobacco: Never   Vaping Use    Vaping Use: Never used   Substance and Sexual Activity    Alcohol use: Yes     Alcohol/week: 3.0 standard drinks     Types: 3 Glasses of wine per week     Comment: socially    Drug use: No    Sexual activity: Not Currently     Partners: Male     Birth control/protection: Pill   Other Topics Concern    Not on file   Social History Narrative    Not on file     Social Determinants of Health     Financial Resource Strain: Low Risk     Difficulty of Paying Living Expenses: Not hard at all   Food Insecurity: No Food Insecurity    Worried About [address] in the Last Year: Never true    [address] in the Last Year: Never true   Transportation Needs: No Transportation Needs    Lack of Transportation (Medical): No    Lack of Transportation (Non-Medical):  No   Physical Activity: Not on file   Stress: Not on file   Social Connections: Not on file   Intimate Partner Violence: Not on file   Housing Stability: Not on file     TOBACCO:   reports that she has never smoked. She has never used smokeless tobacco.  ETOH:   reports current alcohol use of about 3.0 standard drinks per week. Family History:   Family History   Problem Relation Age of Onset    Rheum Arthritis Mother     No Known Problems Father     No Known Problems Sister     Breast Cancer Paternal Grandmother        Diagnosis:    1. Adjustment disorder, unspecified type                  Diagnosis Date    [facility]       Plan:  Pt interventions:  See above    Pt Behavioral Change Plan:   See Pt Instructions       Desiree Stauffer, was evaluated through a synchronous (real-time) audio-video encounter. The patient (or guardian if applicable) is aware that this is a billable service, which includes applicable co-pays. This Virtual Visit was conducted with patient's (and/or legal guardian's) consent. The visit was conducted pursuant to the emergency declaration under the [facility], [address] [place] [address] authority and the [employer] and [employer] General Act. Patient identification was verified, and a caregiver was present when appropriate. The patient was located in a state where the provider was licensed to provide care.

## 2023-03-26 DIAGNOSIS — M05.79 RHEUMATOID ARTHRITIS INVOLVING MULTIPLE SITES WITH POSITIVE RHEUMATOID FACTOR (HCC): ICD-10-CM

## 2023-03-26 DIAGNOSIS — M79.7 FIBROMYALGIA: ICD-10-CM

## 2023-03-28 ENCOUNTER — TELEMEDICINE (OUTPATIENT)
Dept: PSYCHOLOGY | Age: 28
End: 2023-03-28
Payer: COMMERCIAL

## 2023-03-28 DIAGNOSIS — F43.20 ADJUSTMENT DISORDER, UNSPECIFIED TYPE: Primary | ICD-10-CM

## 2023-03-28 PROCEDURE — 90832 PSYTX W PT 30 MINUTES: CPT | Performed by: PSYCHOLOGIST

## 2023-03-28 RX ORDER — FOLIC ACID 1 MG/1
1 TABLET ORAL DAILY
Qty: 30 TABLET | Refills: 0 | Status: SHIPPED | OUTPATIENT
Start: 2023-03-28 | End: 2023-04-27

## 2023-03-28 RX ORDER — GABAPENTIN 100 MG/1
100 CAPSULE ORAL
Qty: 30 CAPSULE | Refills: 0 | Status: SHIPPED | OUTPATIENT
Start: 2023-04-17 | End: 2023-05-17

## 2023-03-28 NOTE — PROGRESS NOTES
on file   Intimate Partner Violence: Not on file   Housing Stability: Not on file     TOBACCO:   reports that she has never smoked. She has never used smokeless tobacco.  ETOH:   reports current alcohol use of about 3.0 standard drinks per week. Family History:   Family History   Problem Relation Age of Onset    Rheum Arthritis Mother     No Known Problems Father     No Known Problems Sister     Breast Cancer Paternal Grandmother        Diagnosis:    1. Adjustment disorder, unspecified type            Diagnosis Date    Polyarthritis with positive rheumatoid factor ([facility] 2/12/2019       Plan:  Pt interventions:  See above    Pt Behavioral Change Plan:   See Pt Instructions       Lobo Noriega, was evaluated through a synchronous (real-time) audio-video encounter. The patient (or guardian if applicable) is aware that this is a billable service, which includes applicable co-pays. This Virtual Visit was conducted with patient's (and/or legal guardian's) consent. The visit was conducted pursuant to the emergency declaration under the [facility] [address] authority and the [employer] and [employer] General Act. Patient identification was verified, and a caregiver was present when appropriate. The patient was located in a state where the provider was licensed to provide care.

## 2023-03-28 NOTE — PATIENT INSTRUCTIONS
-Make list of things you need to do every weekend   -Monday and Tuesday are overflow times for laundry, meal prep, etc.  -Reach out to [facility] about options for hanging out on a Saturday

## 2024-06-17 LAB — PAP SMEAR, EXTERNAL: NORMAL

## 2024-07-23 ENCOUNTER — OFFICE VISIT (OUTPATIENT)
Dept: PRIMARY CARE CLINIC | Age: 29
End: 2024-07-23
Payer: COMMERCIAL

## 2024-07-23 VITALS
BODY MASS INDEX: 22.03 KG/M2 | WEIGHT: 129.03 LBS | HEIGHT: 64 IN | DIASTOLIC BLOOD PRESSURE: 88 MMHG | OXYGEN SATURATION: 100 % | TEMPERATURE: 97.1 F | HEART RATE: 86 BPM | SYSTOLIC BLOOD PRESSURE: 126 MMHG

## 2024-07-23 DIAGNOSIS — Z00.00 ENCOUNTER FOR ANNUAL PHYSICAL EXAM: Primary | ICD-10-CM

## 2024-07-23 DIAGNOSIS — M79.7 FIBROMYALGIA: ICD-10-CM

## 2024-07-23 DIAGNOSIS — F41.9 ANXIETY: Chronic | ICD-10-CM

## 2024-07-23 DIAGNOSIS — K21.9 GASTROESOPHAGEAL REFLUX DISEASE WITHOUT ESOPHAGITIS: ICD-10-CM

## 2024-07-23 DIAGNOSIS — R10.11 RIGHT UPPER QUADRANT ABDOMINAL PAIN: ICD-10-CM

## 2024-07-23 PROBLEM — N94.6 DYSMENORRHEA: Status: RESOLVED | Noted: 2017-09-11 | Resolved: 2024-07-23

## 2024-07-23 PROBLEM — F33.1 MODERATE EPISODE OF RECURRENT MAJOR DEPRESSIVE DISORDER (HCC): Status: RESOLVED | Noted: 2022-04-06 | Resolved: 2024-07-23

## 2024-07-23 PROCEDURE — 99214 OFFICE O/P EST MOD 30 MIN: CPT | Performed by: STUDENT IN AN ORGANIZED HEALTH CARE EDUCATION/TRAINING PROGRAM

## 2024-07-23 NOTE — PROGRESS NOTES
answered    Gastroesophageal reflux disease without esophagitis  Chronic well controlled  Continue current regimen    Fibromyalgia/RA  Per rheum    Anxiety  Chronic well controlled  Continue current regimen    Right upper quadrant abdominal pain  -     US ABDOMEN LIMITED; Future    Return in about 1 year (around 7/23/2025), or if symptoms worsen or fail to improve.           --Katherine Fuentes MD

## 2025-03-24 ENCOUNTER — TELEMEDICINE (OUTPATIENT)
Dept: PRIMARY CARE CLINIC | Age: 30
End: 2025-03-24
Payer: COMMERCIAL

## 2025-03-24 DIAGNOSIS — F41.1 GAD (GENERALIZED ANXIETY DISORDER): Primary | ICD-10-CM

## 2025-03-24 PROCEDURE — 99213 OFFICE O/P EST LOW 20 MIN: CPT | Performed by: STUDENT IN AN ORGANIZED HEALTH CARE EDUCATION/TRAINING PROGRAM

## 2025-03-24 RX ORDER — HYDROXYZINE HYDROCHLORIDE 10 MG/1
10 TABLET, FILM COATED ORAL 2 TIMES DAILY PRN
Qty: 60 TABLET | Refills: 0 | Status: SHIPPED | OUTPATIENT
Start: 2025-03-24 | End: 2025-04-23

## 2025-03-24 NOTE — PROGRESS NOTES
Suzanne Damon, was evaluated through a synchronous (real-time) audio-video encounter. The patient (or guardian if applicable) is aware that this is a billable service, which includes applicable co-pays. This Virtual Visit was conducted with patient's (and/or legal guardian's) consent. Patient identification was verified, and a caregiver was present when appropriate.   The patient was located at Home: [address] [facility] [address]  Provider was located at Facility (Appt Dept): [address] [place] [address]  [address]  [place] [address]  Confirm you are appropriately licensed, registered, or certified to deliver care in the state where the patient is located as indicated above. If you are not or unsure, please re-schedule the visit: Yes, I confirm.     Suzanne Damon (:  1995) is a Established patient, presenting virtually for evaluation of the following:      Below is the assessment and plan developed based on review of pertinent history, physical exam, labs, studies, and medications.     Assessment & Plan  JENNY (generalized anxiety disorder)     Chronic not well-controlled  Check labs  Hydroxyzine as needed  Orders:    hydrOXYzine HCl (ATARAX) 10 MG tablet; Take 1 tablet by mouth 2 times daily as needed for Anxiety    TSH reflex to FT4,FT3 ([place] Only); Future    CBC with Auto Differential; Future    Ferritin; Future    Iron and TIBC; Future      Return if symptoms worsen or fail to improve.       Subjective   HPI    Having a lot of anxiety about a year, over past year more stress and big surgery recently, right now living with parents, crying a lot, panic attacks occasionally has been on SSRI's in past and felt worse.  Has tried therapy and would like to do this again     Review of Systems   All other systems reviewed and are negative.         Objective   Patient-Reported Vitals  No data recorded     Physical Exam  Constitutional:       Appearance: Normal appearance.   HENT:      Head: Normocephalic and

## 2025-03-24 NOTE — PATIENT INSTRUCTIONS
I do not know who is covered under your insurance but here are a few resources you can try to get an appointment.        Psychology Today  Go to website to look for a therapist  [email or web address]  This is great because you can put in insurance info and it will show all therapists that take your insurance and you can read the bio's and see which one would be a good fit.      Dr Jared Garza  [email or web address]  [facility]  [address]  [address]  [place] [address] [phone]      [facility] [place]  [email or web address]  Phone:: [phone]  Fax: [phone] [address] [id number]      [facility]  [email or web address]  [address]   [place] [address]       [phone]    [facility]  [address] [id number] [phone]  [email or web address]  [email or web address]

## 2025-03-26 DIAGNOSIS — F41.1 GAD (GENERALIZED ANXIETY DISORDER): ICD-10-CM

## 2025-03-26 DIAGNOSIS — Z00.00 ENCOUNTER FOR ANNUAL PHYSICAL EXAM: ICD-10-CM

## 2025-03-26 LAB
BASOPHILS # BLD: 0 K/UL (ref 0–0.2)
BASOPHILS NFR BLD: 0.5 %
CHOLEST SERPL-MCNC: 205 MG/DL (ref 0–199)
DEPRECATED RDW RBC AUTO: 13 % (ref 12.4–15.4)
EOSINOPHIL # BLD: 0 K/UL (ref 0–0.6)
EOSINOPHIL NFR BLD: 0.6 %
FERRITIN SERPL IA-MCNC: 13 NG/ML (ref 15–150)
HCT VFR BLD AUTO: 38.6 % (ref 36–48)
HDLC SERPL-MCNC: 81 MG/DL (ref 40–60)
HGB BLD-MCNC: 13 G/DL (ref 12–16)
IRON SATN MFR SERPL: 10 % (ref 15–50)
IRON SERPL-MCNC: 46 UG/DL (ref 37–145)
LDLC SERPL CALC-MCNC: 90 MG/DL
LYMPHOCYTES # BLD: 3.1 K/UL (ref 1–5.1)
LYMPHOCYTES NFR BLD: 44.7 %
MCH RBC QN AUTO: 29.6 PG (ref 26–34)
MCHC RBC AUTO-ENTMCNC: 33.7 G/DL (ref 31–36)
MCV RBC AUTO: 87.8 FL (ref 80–100)
MONOCYTES # BLD: 0.4 K/UL (ref 0–1.3)
MONOCYTES NFR BLD: 5.8 %
NEUTROPHILS # BLD: 3.3 K/UL (ref 1.7–7.7)
NEUTROPHILS NFR BLD: 48.4 %
PLATELET # BLD AUTO: 249 K/UL (ref 135–450)
PMV BLD AUTO: 8.8 FL (ref 5–10.5)
RBC # BLD AUTO: 4.4 M/UL (ref 4–5.2)
TIBC SERPL-MCNC: 448 UG/DL (ref 260–445)
TRIGL SERPL-MCNC: 169 MG/DL (ref 0–150)
TSH SERPL DL<=0.005 MIU/L-ACNC: 1.06 UIU/ML (ref 0.27–4.2)
VLDLC SERPL CALC-MCNC: 34 MG/DL
WBC # BLD AUTO: 6.8 K/UL (ref 4–11)

## 2025-03-27 ENCOUNTER — RESULTS FOLLOW-UP (OUTPATIENT)
Dept: PRIMARY CARE CLINIC | Age: 30
End: 2025-03-27

## 2025-03-27 DIAGNOSIS — E61.1 IRON DEFICIENCY: Primary | ICD-10-CM

## 2025-03-27 RX ORDER — FERROUS GLUCONATE 324(38)MG
324 TABLET ORAL EVERY OTHER DAY
Qty: 60 TABLET | Refills: 1 | Status: SHIPPED | OUTPATIENT
Start: 2025-03-27